# Patient Record
Sex: FEMALE | Race: WHITE | NOT HISPANIC OR LATINO | Employment: FULL TIME | URBAN - METROPOLITAN AREA
[De-identification: names, ages, dates, MRNs, and addresses within clinical notes are randomized per-mention and may not be internally consistent; named-entity substitution may affect disease eponyms.]

---
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
## 2019-06-21 ENCOUNTER — OFFICE VISIT (OUTPATIENT)
Dept: INTERNAL MEDICINE | Facility: CLINIC | Age: 19
End: 2019-06-21

## 2019-06-21 VITALS
RESPIRATION RATE: 14 BRPM | BODY MASS INDEX: 24.49 KG/M2 | HEIGHT: 65 IN | WEIGHT: 147 LBS | SYSTOLIC BLOOD PRESSURE: 118 MMHG | DIASTOLIC BLOOD PRESSURE: 72 MMHG

## 2019-06-21 DIAGNOSIS — N91.2 AMENORRHEA: ICD-10-CM

## 2019-06-21 DIAGNOSIS — Z00.00 HEALTH CARE MAINTENANCE: Primary | ICD-10-CM

## 2019-06-21 PROBLEM — G43.009 MIGRAINE WITHOUT AURA AND WITHOUT STATUS MIGRAINOSUS, NOT INTRACTABLE: Status: RESOLVED | Noted: 2018-02-07 | Resolved: 2019-06-21

## 2019-06-21 PROBLEM — IMO0002 CHRONIC MIGRAINE: Status: ACTIVE | Noted: 2018-02-07

## 2019-06-21 PROBLEM — F33.42 RECURRENT MAJOR DEPRESSIVE DISORDER, IN FULL REMISSION: Status: RESOLVED | Noted: 2018-02-07 | Resolved: 2019-06-21

## 2019-06-21 PROBLEM — K29.00 ACUTE SUPERFICIAL GASTRITIS WITHOUT HEMORRHAGE: Status: ACTIVE | Noted: 2018-02-07

## 2019-06-21 PROBLEM — F33.42 RECURRENT MAJOR DEPRESSIVE DISORDER, IN FULL REMISSION (HCC): Status: ACTIVE | Noted: 2018-02-07

## 2019-06-21 PROBLEM — K29.00 ACUTE SUPERFICIAL GASTRITIS WITHOUT HEMORRHAGE: Status: RESOLVED | Noted: 2018-02-07 | Resolved: 2019-06-21

## 2019-06-21 PROBLEM — G43.009 MIGRAINE WITHOUT AURA AND WITHOUT STATUS MIGRAINOSUS, NOT INTRACTABLE: Status: ACTIVE | Noted: 2018-02-07

## 2019-06-21 PROCEDURE — 81025 URINE PREGNANCY TEST: CPT | Performed by: INTERNAL MEDICINE

## 2019-06-21 PROCEDURE — 93000 ELECTROCARDIOGRAM COMPLETE: CPT | Performed by: INTERNAL MEDICINE

## 2019-06-21 PROCEDURE — 99385 PREV VISIT NEW AGE 18-39: CPT | Performed by: INTERNAL MEDICINE

## 2019-06-21 RX ORDER — ALBUTEROL SULFATE 90 UG/1
2 AEROSOL, METERED RESPIRATORY (INHALATION) EVERY 4 HOURS PRN
COMMUNITY
End: 2020-04-15 | Stop reason: SDUPTHER

## 2019-06-21 RX ORDER — ALBUTEROL SULFATE 1.25 MG/3ML
1 SOLUTION RESPIRATORY (INHALATION) EVERY 6 HOURS PRN
COMMUNITY
End: 2020-09-23

## 2019-06-21 RX ORDER — BUDESONIDE AND FORMOTEROL FUMARATE DIHYDRATE 160; 4.5 UG/1; UG/1
2 AEROSOL RESPIRATORY (INHALATION) 2 TIMES DAILY
COMMUNITY
End: 2019-07-09 | Stop reason: ALTCHOICE

## 2019-06-21 RX ORDER — CETIRIZINE HYDROCHLORIDE 10 MG/1
10 TABLET ORAL DAILY
COMMUNITY
End: 2020-09-23

## 2019-06-21 NOTE — PATIENT INSTRUCTIONS
Risk evaluation:  1. Cardiovascular risk factors: family history of CAD   2. Diabetes risk factors: FH of diabetes.  3. Cancer risk factors: FH of  colon cancer, FH of breast cancer, FH of ovarian cancer, h/o tocacco smoking and skin type associated with high risk of skin cancers.  4. Risky behavior: none. Use of seat belts: regular. Use of sunscreens: sporadic. SPF 15.   Tattoos: one.  H/o blood transfusions/organ transplants before 1992 or clotting factor transfusion before 1987: none.     Prevention:  Cholesterol test  recommended. Cholesterol is will be checked..  Blood sugar test up to date. Fasting blood sugar will be checked..  Hep C testing (for patients born [birth year]): patient states that she had been tested and is negative..  Mammogram not recommended yet.. Breast self exams recommended once a month.  Self breast examination technique explained in the model.  Colonoscopy: not recommended till the age of 50..  PAP smear : up to date. Recommendations per GYN..  DEXA : not indicated yet..

## 2019-06-21 NOTE — PROGRESS NOTES
"Subjective   Desirae Pascual is a 18 y.o. female.     History of Present Illness   /72 (BP Location: Left arm, Patient Position: Sitting, Cuff Size: Adult)   Resp 14   Ht 165.1 cm (65\")   Wt 66.7 kg (147 lb)   BMI 24.46 kg/m²   Desirae Pascual 18 y.o. female who is here to establish as a new patient. In a past had been to see  at [facility].  Past medical and surgical history, family and social history was obtained by me in the interview and recorded in the EHR. [facility] records available in [facility] had been reviewed and discussed with patient.    Patient also is concerned that she did not have a menstrual period for the last 6 weeks.  She used to have IUD, that was removed 6 months ago.  Patient is sexually active without protection.  Guardicel vaccination is up-to-date.  Has a gynecologist.    Current Outpatient Medications:   •  albuterol (ACCUNEB) 1.25 MG/3ML nebulizer solution, Take 1 ampule by nebulization Every 6 (Six) Hours As Needed., Disp: , Rfl:   •  albuterol sulfate  (90 Base) MCG/ACT inhaler, Inhale 2 puffs Every 4 (Four) Hours As Needed., Disp: , Rfl:   •  budesonide-formoterol (SYMBICORT) 160-4.5 MCG/ACT inhaler, Inhale 2 puffs 2 (Two) Times a Day., Disp: , Rfl:   •  cetirizine (zyrTEC) 10 MG tablet, Take 10 mg by mouth Daily., Disp: , Rfl:   •  Prenatal Vit-Fe Fumarate-FA (PRENATAL COMPLETE PO), Take  by mouth., Disp: , Rfl:   •  ranitidine (ZANTAC) 75 MG tablet, Take 75 mg by mouth 2 (two) times a day., Disp: , Rfl:   •  tiotropium bromide monohydrate (SPIRIVA RESPIMAT) 2.5 MCG/ACT aerosol solution inhaler, Inhale 2 puffs Daily., Disp: , Rfl:   No current facility-administered medications for this visit.   .  Patient rates her own health as: good.  Tobacco use: in remote past, as per .  Alcohol use:occasionally.  Recreational drugs use: none  Medication list rewieved.  Diet: regular.  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  Marital status: lives with " mother.   Employment: full time.  Patient rates her stress level as: high.  Dental health: good. Brushes teeth 2 times a day, does not floss. Dental visits: 2 times a year .  Vision correction: not needed  Hearing: normal.    Recent vaccinations:   Flu  is up to date and recommended yearly  Tdap will leave this to her GYN, as patient is pregnant.  Shingles prevention not recommended at this age  Cervical cancer prevention: completed Guardicel vaccine.    Patient is premenopausal. Past pregnancies: none. Currently patient is on no contraception.      Current Outpatient Medications:   •  cetirizine (zyrTEC) 10 MG tablet, Take 10 mg by mouth Daily., Disp: , Rfl:   •  Prenatal Vit-Fe Fumarate-FA (PRENATAL COMPLETE PO), Take  by mouth., Disp: , Rfl:   •  ranitidine (ZANTAC) 75 MG tablet, Take 75 mg by mouth 2 (two) times a day., Disp: , Rfl:     Current Facility-Administered Medications:   •  Levonorgestrel (LILETTA) 18.6 MCG/DAY IUD, , Intrauterine, Continuous, Robbie Ortiz MD          The following portions of the patient's history were reviewed and updated as appropriate: allergies, current medications, past family history, past medical history, past social history, past surgical history and problem list.    Review of Systems   Constitutional: Negative for chills and fever.   HENT: Negative for postnasal drip, sinus pressure and sore throat.    Eyes: Negative for pain and itching.   Respiratory: Negative for cough and chest tightness.    Cardiovascular: Negative for chest pain and leg swelling.   Gastrointestinal: Negative for abdominal pain and blood in stool.   Endocrine: Negative for cold intolerance and heat intolerance.   Genitourinary: Negative for difficulty urinating and flank pain.   Musculoskeletal: Positive for back pain. Negative for neck pain.   Skin: Negative for color change and rash.   Neurological: Positive for headaches. Negative for dizziness and weakness.   Hematological: Negative for  adenopathy. Does not bruise/bleed easily.   Psychiatric/Behavioral: Positive for sleep disturbance. The patient is nervous/anxious.        Objective   Physical Exam   Constitutional: She is oriented to person, place, and time. She appears well-developed and well-nourished. No distress.   HENT:   Head: Normocephalic and atraumatic.   Right Ear: External ear normal.   Left Ear: External ear normal.   Nose: Nose normal.   Mouth/Throat: Oropharynx is clear and moist. No oropharyngeal exudate.   Eyes: Conjunctivae and EOM are normal. Pupils are equal, round, and reactive to light. Right eye exhibits no discharge. Left eye exhibits no discharge. No scleral icterus.   Neck: Normal range of motion. Neck supple. No JVD present. No thyromegaly present.   Cardiovascular: Normal rate, regular rhythm, S1 normal, S2 normal, normal heart sounds and intact distal pulses. Exam reveals no gallop and no friction rub.   No murmur heard.  Pulmonary/Chest: Effort normal and breath sounds normal. No respiratory distress. She has no decreased breath sounds. She has no wheezes. She has no rhonchi. She has no rales. Right breast exhibits no inverted nipple, no mass, no nipple discharge, no skin change and no tenderness. Left breast exhibits no inverted nipple, no mass, no nipple discharge, no skin change and no tenderness. Breasts are symmetrical.   Abdominal: Soft. Bowel sounds are normal. She exhibits no distension and no mass. There is no tenderness. There is no rebound and no guarding.   Musculoskeletal: She exhibits no edema.   Lymphadenopathy:        Head (right side): No submental, no submandibular, no preauricular, no posterior auricular and no occipital adenopathy present.        Head (left side): No submental, no submandibular, no preauricular, no posterior auricular and no occipital adenopathy present.     She has no cervical adenopathy.        Right cervical: No superficial cervical, no deep cervical and no posterior cervical  adenopathy present.       Left cervical: No superficial cervical, no deep cervical and no posterior cervical adenopathy present.     She has no axillary adenopathy.        Right: No supraclavicular adenopathy present.        Left: No supraclavicular adenopathy present.   Neurological: She is alert and oriented to person, place, and time. She has normal reflexes. She displays normal reflexes. No cranial nerve deficit. She exhibits normal muscle tone. Coordination normal.   Reflex Scores:       Bicep reflexes are 2+ on the right side and 2+ on the left side.       Patellar reflexes are 2+ on the right side and 2+ on the left side.  Skin: Skin is warm. No rash noted. She is not diaphoretic. No erythema.   Small tattoo left wrist, umbilical piercing   Psychiatric: She has a normal mood and affect. Her behavior is normal. Thought content normal.   Vitals reviewed.    Reason for ECG:  screening  Rhythm: sinus  Arterial rate:90  NY interval: nl  P waves:nl  QRS:nl  ST: nl  QTc:448   Comparison: unavailable   Impression: normal ECG    Assessment/Plan   Desirae was seen today for establish care.    Diagnoses and all orders for this visit:    Health care maintenance         Risk evaluation:  1. Cardiovascular risk factors: family history of CAD   2. Diabetes risk factors: FH of diabetes.  3. Cancer risk factors: FH of  colon cancer, FH of breast cancer, FH of ovarian cancer, h/o tocacco smoking and skin type associated with high risk of skin cancers.  4. Risky behavior: none. Use of seat belts: regular. Use of sunscreens: sporadic. SPF 15.   Tattoos: one.  H/o blood transfusions/organ transplants before 1992 or clotting factor transfusion before 1987: none.     Prevention:  Cholesterol test  recommended. Cholesterol is will be checked..  Blood sugar test up to date. Fasting blood sugar will be checked..  Hep C testing (for patients born [birth year]72): patient states that she had been tested and is negative..  Mammogram not  recommended yet.. Breast self exams recommended once a month.  Self breast examination technique explained in the model.  Colonoscopy: not recommended till the age of 50..  PAP smear : up to date. Recommendations per GYN.  She states that she had a recent HIV test, that was negative.  DEXA : not indicated yet..       I had asked patient to have your vaccination records from pediatrician forwarded to our office.                   Amenorrhea -urine pregnancy test is positive.  I had advised patient on regular exercise.  Her current medication seems to be quite safe for the fetus.

## 2019-06-24 LAB — B-HCG UR QL: POSITIVE

## 2019-07-01 ENCOUNTER — TELEPHONE (OUTPATIENT)
Dept: OBSTETRICS AND GYNECOLOGY | Age: 19
End: 2019-07-01

## 2019-07-01 NOTE — TELEPHONE ENCOUNTER
New pt, former pt of Dr Ortiz, 8 weeks preg LMP 05/04/2019, 1st preg wants see Dr Yap. Pt is super sick and nauseous. Pt wants to Pt has no gyn issues. Pt INS: [employer]. Please Advise

## 2019-07-09 ENCOUNTER — INITIAL PRENATAL (OUTPATIENT)
Dept: OBSTETRICS AND GYNECOLOGY | Age: 19
End: 2019-07-09

## 2019-07-09 ENCOUNTER — PROCEDURE VISIT (OUTPATIENT)
Dept: OBSTETRICS AND GYNECOLOGY | Age: 19
End: 2019-07-09

## 2019-07-09 VITALS — WEIGHT: 145 LBS | DIASTOLIC BLOOD PRESSURE: 80 MMHG | SYSTOLIC BLOOD PRESSURE: 122 MMHG | BODY MASS INDEX: 24.13 KG/M2

## 2019-07-09 DIAGNOSIS — J45.40 MODERATE PERSISTENT ASTHMA WITHOUT COMPLICATION: ICD-10-CM

## 2019-07-09 DIAGNOSIS — O36.80X0 ENCOUNTER TO DETERMINE FETAL VIABILITY OF PREGNANCY, SINGLE OR UNSPECIFIED FETUS: Primary | ICD-10-CM

## 2019-07-09 DIAGNOSIS — Z11.3 SCREEN FOR SEXUALLY TRANSMITTED DISEASES: ICD-10-CM

## 2019-07-09 DIAGNOSIS — Z34.90 PREGNANCY, UNSPECIFIED GESTATIONAL AGE: Primary | ICD-10-CM

## 2019-07-09 DIAGNOSIS — Z13.89 SCREENING FOR BLOOD OR PROTEIN IN URINE: ICD-10-CM

## 2019-07-09 DIAGNOSIS — Z12.4 ROUTINE CERVICAL SMEAR: ICD-10-CM

## 2019-07-09 PROBLEM — Z00.00 HEALTH CARE MAINTENANCE: Status: RESOLVED | Noted: 2019-06-21 | Resolved: 2019-07-09

## 2019-07-09 LAB — VZV IGG SER QL: NORMAL

## 2019-07-09 PROCEDURE — 0501F PRENATAL FLOW SHEET: CPT | Performed by: OBSTETRICS & GYNECOLOGY

## 2019-07-09 PROCEDURE — 76817 TRANSVAGINAL US OBSTETRIC: CPT | Performed by: OBSTETRICS & GYNECOLOGY

## 2019-07-09 RX ORDER — PRENATAL WITH FERROUS FUM AND FOLIC ACID 3080; 920; 120; 400; 22; 1.84; 3; 20; 10; 1; 12; 200; 27; 25; 2 [IU]/1; [IU]/1; MG/1; [IU]/1; MG/1; MG/1; MG/1; MG/1; MG/1; MG/1; UG/1; MG/1; MG/1; MG/1; MG/1
1 TABLET ORAL DAILY
Qty: 30 TABLET | Refills: 11 | Status: SHIPPED | OUTPATIENT
Start: 2019-07-09 | End: 2019-07-29 | Stop reason: SDUPTHER

## 2019-07-09 RX ORDER — DOXYLAMINE SUCCINATE AND PYRIDOXINE HYDROCHLORIDE, DELAYED RELEASE TABLETS 10 MG/10 MG 10; 10 MG/1; MG/1
TABLET, DELAYED RELEASE ORAL
Qty: 100 TABLET | Refills: 3 | Status: SHIPPED | OUTPATIENT
Start: 2019-07-09 | End: 2019-07-29

## 2019-07-09 NOTE — PROGRESS NOTES
The patient is a 19-year-old  1 para 0 at 7 weeks 5 days by ultrasound.  Patient is a sure LMP but ultrasound does not agree.  Patient has had no vaginal bleeding.  She does have some mild nausea.  She request medication for anxiety.  Patient has a history of cutting and drug abuse.  Patient has seen psychiatry in the past.  She states she quit using hydrocodone and Adderall in 2017.  She quit marijuana just 3 weeks ago and stopped vaping just a few weeks ago also.  She is here today with her mother and boyfriend.  She works as a .    Patient does have significant asthma.  She is on a steroid inhaler and a rescue inhaler.  She also uses a category C inhaler intermittently.  She has been on oral steroids in the past.     Genetic history was reviewed and is negative    Family history is significant for breast cancer ovarian cancer melanoma and colon cancer.  She has not had genetic testing.    Exam-see exam tab  Ultrasound shows crown-rump length of 7 weeks 5 days with fetal heart rate of 164.  Corpus luteum on the left with no adnexal masses.    Assessment-7 weeks  Full new OB information and folder was reviewed with the patient prenatal labs will be sent off today.  EDC was adjusted so we will bring the patient back in 3 weeks for viability ultrasound.  Asthma-patient has significant asthma.  She does see a family and allergy doctor.  I did change her steroid inhaler to a category B inhaler.  Her other inhaler is category C but she uses it only intermittently.  Anxiety-patient will try her lavender oil and seeing her psychiatrist.  We will hold off on using medications right now.  Patient desires first trimester testing.  She declines amniocentesis.  Genetic history of cancers in the family.  Patient will have the full my risk test done today.

## 2019-07-10 LAB
ABO GROUP BLD: (no result)
BASOPHILS # BLD AUTO: 0 X10E3/UL (ref 0–0.2)
BASOPHILS NFR BLD AUTO: 0 %
BLD GP AB SCN SERPL QL: NEGATIVE
EOSINOPHIL # BLD AUTO: 0.2 X10E3/UL (ref 0–0.4)
EOSINOPHIL NFR BLD AUTO: 2 %
ERYTHROCYTE [DISTWIDTH] IN BLOOD BY AUTOMATED COUNT: 12.2 % (ref 12.3–15.4)
HBV SURFACE AG SERPL QL IA: NEGATIVE
HCT VFR BLD AUTO: 36.4 % (ref 34–46.6)
HCV AB S/CO SERPL IA: 0.1 S/CO RATIO (ref 0–0.9)
HGB BLD-MCNC: 12.6 G/DL (ref 11.1–15.9)
HIV 1+2 AB+HIV1 P24 AG SERPL QL IA: NON REACTIVE
IMM GRANULOCYTES # BLD AUTO: 0 X10E3/UL (ref 0–0.1)
IMM GRANULOCYTES NFR BLD AUTO: 0 %
LYMPHOCYTES # BLD AUTO: 1.3 X10E3/UL (ref 0.7–3.1)
LYMPHOCYTES NFR BLD AUTO: 22 %
MCH RBC QN AUTO: 30.3 PG (ref 26.6–33)
MCHC RBC AUTO-ENTMCNC: 34.6 G/DL (ref 31.5–35.7)
MCV RBC AUTO: 88 FL (ref 79–97)
MONOCYTES # BLD AUTO: 0.5 X10E3/UL (ref 0.1–0.9)
MONOCYTES NFR BLD AUTO: 7 %
NEUTROPHILS # BLD AUTO: 4.2 X10E3/UL (ref 1.4–7)
NEUTROPHILS NFR BLD AUTO: 69 %
PLATELET # BLD AUTO: 179 X10E3/UL (ref 150–450)
RBC # BLD AUTO: 4.16 X10E6/UL (ref 3.77–5.28)
RH BLD: POSITIVE
RPR SER QL: NON REACTIVE
RUBV IGG SERPL IA-ACNC: 3.33 INDEX
WBC # BLD AUTO: 6.1 X10E3/UL (ref 3.4–10.8)

## 2019-07-11 ENCOUNTER — TELEPHONE (OUTPATIENT)
Dept: OBSTETRICS AND GYNECOLOGY | Age: 19
End: 2019-07-11

## 2019-07-11 LAB
BACTERIA UR CULT: NO GROWTH
BACTERIA UR CULT: NORMAL
C TRACH RRNA CVX QL NAA+PROBE: NEGATIVE
CONV .: NORMAL
CYTOLOGIST CVX/VAG CYTO: NORMAL
CYTOLOGY CVX/VAG DOC CYTO: NORMAL
CYTOLOGY CVX/VAG DOC THIN PREP: NORMAL
DX ICD CODE: NORMAL
HIV 1 & 2 AB SER-IMP: NORMAL
N GONORRHOEA RRNA CVX QL NAA+PROBE: NEGATIVE
OTHER STN SPEC: NORMAL
PATHOLOGIST CVX/VAG CYTO: NORMAL
STAT OF ADQ CVX/VAG CYTO-IMP: NORMAL

## 2019-07-11 NOTE — TELEPHONE ENCOUNTER
----- Message from Nguyễn Yap MD sent at 7/10/2019  4:31 PM EDT -----  Please notify blood work is normal.

## 2019-07-12 ENCOUNTER — TELEPHONE (OUTPATIENT)
Dept: OBSTETRICS AND GYNECOLOGY | Age: 19
End: 2019-07-12

## 2019-07-12 DIAGNOSIS — Z80.3 FAMILY HISTORY OF BREAST CANCER: Primary | ICD-10-CM

## 2019-07-12 DIAGNOSIS — Z80.0 FAMILY HISTORY OF COLON CANCER: ICD-10-CM

## 2019-07-12 DIAGNOSIS — Z80.41 FAMILY HISTORY OF OVARIAN CANCER: ICD-10-CM

## 2019-07-16 ENCOUNTER — TELEPHONE (OUTPATIENT)
Dept: OBSTETRICS AND GYNECOLOGY | Age: 19
End: 2019-07-16

## 2019-07-23 PROBLEM — Z80.3 FAMILY HISTORY OF BREAST CANCER: Status: ACTIVE | Noted: 2019-07-23

## 2019-07-29 ENCOUNTER — ROUTINE PRENATAL (OUTPATIENT)
Dept: OBSTETRICS AND GYNECOLOGY | Age: 19
End: 2019-07-29

## 2019-07-29 ENCOUNTER — PROCEDURE VISIT (OUTPATIENT)
Dept: OBSTETRICS AND GYNECOLOGY | Age: 19
End: 2019-07-29

## 2019-07-29 VITALS — WEIGHT: 147 LBS | BODY MASS INDEX: 24.46 KG/M2 | SYSTOLIC BLOOD PRESSURE: 108 MMHG | DIASTOLIC BLOOD PRESSURE: 74 MMHG

## 2019-07-29 DIAGNOSIS — F19.11 HISTORY OF DRUG ABUSE (HCC): Primary | ICD-10-CM

## 2019-07-29 DIAGNOSIS — Z34.01 ENCOUNTER FOR SUPERVISION OF NORMAL FIRST PREGNANCY IN FIRST TRIMESTER: ICD-10-CM

## 2019-07-29 DIAGNOSIS — O36.80X0 ENCOUNTER TO DETERMINE FETAL VIABILITY OF PREGNANCY, SINGLE OR UNSPECIFIED FETUS: Primary | ICD-10-CM

## 2019-07-29 PROCEDURE — 76817 TRANSVAGINAL US OBSTETRIC: CPT | Performed by: OBSTETRICS & GYNECOLOGY

## 2019-07-29 PROCEDURE — 0502F SUBSEQUENT PRENATAL CARE: CPT | Performed by: OBSTETRICS & GYNECOLOGY

## 2019-07-29 NOTE — PROGRESS NOTES
Patient is having more anxiety off her Celexa.  She is also having some headaches.  She is trying Tylenol.  Her asthma has not been bad and she has not used any of her inhalers.  Her nausea is resolved.  She would like to do first trimester testing.    Prenatal labs are reviewed.  Drug screen was sent off today.  Viability ultrasound shows heart rate of 168.  2 pound weight gain since last visit.    Assessment-10 weeks  Anxiety-we discussed anxiety.  Patient will try to continue off Celexa.  If she is still having difficulty she may need to restart.  She will try Benadryl as needed to help with sleep and headaches.  Asthma-no current symptoms  First trimester testing was sent off today.

## 2019-07-30 LAB
AMPHETAMINES UR QL SCN: NEGATIVE NG/ML
BARBITURATES UR QL SCN: NEGATIVE NG/ML
BENZODIAZ UR QL: NEGATIVE NG/ML
BZE UR QL: NEGATIVE NG/ML
CANNABINOIDS UR QL SCN: NEGATIVE NG/ML
METHADONE UR QL SCN: NEGATIVE NG/ML
OPIATES UR QL: NEGATIVE NG/ML
PCP UR QL: NEGATIVE NG/ML
PROPOXYPH UR QL SCN: NEGATIVE NG/ML

## 2019-08-05 ENCOUNTER — TELEPHONE (OUTPATIENT)
Dept: OBSTETRICS AND GYNECOLOGY | Age: 19
End: 2019-08-05

## 2019-08-05 NOTE — TELEPHONE ENCOUNTER
----- Message from Nguyễn Yap MD sent at 8/5/2019  8:59 AM EDT -----  Notify first trimester testing is normal.  Notify of gender if the patient would like to know.

## 2019-08-19 ENCOUNTER — ROUTINE PRENATAL (OUTPATIENT)
Dept: OBSTETRICS AND GYNECOLOGY | Age: 19
End: 2019-08-19

## 2019-08-19 VITALS — DIASTOLIC BLOOD PRESSURE: 70 MMHG | SYSTOLIC BLOOD PRESSURE: 128 MMHG | WEIGHT: 151 LBS | BODY MASS INDEX: 25.13 KG/M2

## 2019-08-19 DIAGNOSIS — Z34.90 PREGNANCY, UNSPECIFIED GESTATIONAL AGE: ICD-10-CM

## 2019-08-19 DIAGNOSIS — J45.40 MODERATE PERSISTENT ASTHMA WITHOUT COMPLICATION: Primary | ICD-10-CM

## 2019-08-19 PROCEDURE — 0502F SUBSEQUENT PRENATAL CARE: CPT | Performed by: OBSTETRICS & GYNECOLOGY

## 2019-08-19 NOTE — PROGRESS NOTES
Complains of some tightness with her asthma.  She has done 2 albuterol treatments this week.  She never picked up the steroid inhaler that I prescribed.  She would like to stay off the Celexa.  She is having some insomnia and Benadryl does not help.  She did find out the gender of the baby.    First trimester testing is normal as is carrier testing.  Baby is a girl.  These results were reviewed with the patient  Drug screen was negative  Positive Doppler heart tones.  Weight gain for pregnancy is slightly high  Lungs- one expiratory wheeze heard but otherwise lungs are clear.    Assessment-13 weeks  Asthma-recommend the patient start Pulmicort steroid inhaler daily and continue albuterol as needed.  She will come to the hospital if symptoms are not resolved.  Anxiety  Follow-up in 4 weeks for AFP test

## 2019-09-09 ENCOUNTER — TELEPHONE (OUTPATIENT)
Dept: OBSTETRICS AND GYNECOLOGY | Age: 19
End: 2019-09-09

## 2019-09-09 NOTE — TELEPHONE ENCOUNTER
Dr Yap pt 16 weeks 4 days has been experiencing dizzy spells. Pt states it happened two times last week and one time today. Pt states she sats down, drinks water, and it goes away. Pt just wants to know if that is normal during pregnancy. Pt is scheduled 09/16/19 for OB f/u and Anatomy Scan. Please Advise

## 2019-09-09 NOTE — TELEPHONE ENCOUNTER
Please notify this is a very common symptom in pregnancy.  Please have patient eat frequent snacks about every 3-4 hours and drink enough water.  We could check a CBC at her next appointment.

## 2019-09-16 ENCOUNTER — ROUTINE PRENATAL (OUTPATIENT)
Dept: OBSTETRICS AND GYNECOLOGY | Age: 19
End: 2019-09-16

## 2019-09-16 VITALS — BODY MASS INDEX: 26.63 KG/M2 | WEIGHT: 160 LBS | SYSTOLIC BLOOD PRESSURE: 130 MMHG | DIASTOLIC BLOOD PRESSURE: 74 MMHG

## 2019-09-16 DIAGNOSIS — Z34.90 PREGNANCY, UNSPECIFIED GESTATIONAL AGE: ICD-10-CM

## 2019-09-16 DIAGNOSIS — F19.11 HISTORY OF DRUG ABUSE (HCC): Primary | ICD-10-CM

## 2019-09-16 DIAGNOSIS — J45.40 MODERATE PERSISTENT ASTHMA WITHOUT COMPLICATION: ICD-10-CM

## 2019-09-16 PROCEDURE — 0502F SUBSEQUENT PRENATAL CARE: CPT | Performed by: OBSTETRICS & GYNECOLOGY

## 2019-09-16 NOTE — PROGRESS NOTES
Patient reports that her asthma is much better.  She is using the Pulmicort twice a day.  She is not having more chest tightness.  She reports she can fall asleep but is waking up at night a lot and having some nightmares.  She has tried Benadryl already.  She just got back from a vacation and reports she was not eating very healthy.    Doppler heart tones are positive.  Blood pressure 130/74 with no protein.  Patient gained 9 pounds since her last visit.  Total weight gain for pregnancy is high at 20 pounds.    Assessment-17 weeks  Asthma-improved continue Pulmicort steroid inhaler.  Insomnia- we discussed trying a sleep noise machine and continuing antihistamines as needed.  Vagal episode at work-check CBC today.  Note for lifting restrictions and breaks at work also given  High weight gain for pregnancy.  We discussed diet and exercise.

## 2019-09-18 LAB
AFP ADJ MOM SERPL: 1.32
AFP INTERP SERPL-IMP: NORMAL
AFP INTERP SERPL-IMP: NORMAL
AFP SERPL-MCNC: 53.6 NG/ML
AGE AT DELIVERY: 21.6 YR
ERYTHROCYTE [DISTWIDTH] IN BLOOD BY AUTOMATED COUNT: 13 % (ref 12.3–15.4)
GA METHOD: NORMAL
GA: 18 WEEKS
HCT VFR BLD AUTO: 31.8 % (ref 34–46.6)
HGB BLD-MCNC: 10.5 G/DL (ref 12–15.9)
IDDM PATIENT QL: NO
LABORATORY COMMENT REPORT: NORMAL
MCH RBC QN AUTO: 30 PG (ref 26.6–33)
MCHC RBC AUTO-ENTMCNC: 33 G/DL (ref 31.5–35.7)
MCV RBC AUTO: 90.9 FL (ref 79–97)
MULTIPLE PREGNANCY: NO
NEURAL TUBE DEFECT RISK FETUS: 4529 %
PLATELET # BLD AUTO: 160 10*3/MM3 (ref 140–450)
RBC # BLD AUTO: 3.5 10*6/MM3 (ref 3.77–5.28)
RESULT: NORMAL
WBC # BLD AUTO: 7.7 10*3/MM3 (ref 3.4–10.8)

## 2019-09-20 ENCOUNTER — TELEPHONE (OUTPATIENT)
Dept: INTERNAL MEDICINE | Facility: CLINIC | Age: 19
End: 2019-09-20

## 2019-09-20 ENCOUNTER — TELEPHONE (OUTPATIENT)
Dept: OBSTETRICS AND GYNECOLOGY | Age: 19
End: 2019-09-20

## 2019-09-20 RX ORDER — AZITHROMYCIN 250 MG/1
TABLET, FILM COATED ORAL
Qty: 6 TABLET | Refills: 0 | Status: SHIPPED | OUTPATIENT
Start: 2019-09-20 | End: 2019-10-10

## 2019-09-20 NOTE — TELEPHONE ENCOUNTER
I would strongly discouraged your from taking any medication as she is 3 to 4 months pregnant.  Increase fluid intake, if needed may take some Tylenol over-the-counter in case if she is miserable.  If she feels like she really has to have antibiotic, please advise her to go to immediate care center after hours today or tomorrow.  I would strongly recommend to avoid use of antibiotics in your situation unless absolutely necessary-unless bacterial infection is confirmed.

## 2019-09-20 NOTE — TELEPHONE ENCOUNTER
----- Message from Shelly Rubin sent at 9/20/2019 10:06 AM EDT -----  Contact: pt  Pt is calling complaining of sinus and drainage, cough, pressure in eyes and green snot.  Is unable to come in due to work.  Would like something called in.    Rakesh- new one for [facility].    Pt#-[phone]

## 2019-09-20 NOTE — TELEPHONE ENCOUNTER
(Fracisco pt) Pt wanted to FYI only that she is 5 months pregnant and she was prescribed a z-pac for her sinus infection.

## 2019-09-20 NOTE — TELEPHONE ENCOUNTER
I have given patient your detailed message she states she has spoke to her gynecologist and it is ok for her to take a zpak, she states she is sick, she is at work she cannot come in and wants a zpak

## 2019-10-10 ENCOUNTER — PROCEDURE VISIT (OUTPATIENT)
Dept: OBSTETRICS AND GYNECOLOGY | Age: 19
End: 2019-10-10

## 2019-10-10 ENCOUNTER — ROUTINE PRENATAL (OUTPATIENT)
Dept: OBSTETRICS AND GYNECOLOGY | Age: 19
End: 2019-10-10

## 2019-10-10 VITALS — SYSTOLIC BLOOD PRESSURE: 124 MMHG | WEIGHT: 164 LBS | BODY MASS INDEX: 27.29 KG/M2 | DIASTOLIC BLOOD PRESSURE: 72 MMHG

## 2019-10-10 DIAGNOSIS — Z34.90 PREGNANCY, UNSPECIFIED GESTATIONAL AGE: ICD-10-CM

## 2019-10-10 DIAGNOSIS — Z23 NEEDS FLU SHOT: Primary | ICD-10-CM

## 2019-10-10 DIAGNOSIS — Z34.92 ENCOUNTER FOR SUPERVISION OF NORMAL PREGNANCY IN SECOND TRIMESTER, UNSPECIFIED GRAVIDITY: Primary | ICD-10-CM

## 2019-10-10 DIAGNOSIS — Z03.75 SUSPECTED CERVICAL SHORTENING NOT FOUND: ICD-10-CM

## 2019-10-10 DIAGNOSIS — J45.40 MODERATE PERSISTENT ASTHMA WITHOUT COMPLICATION: ICD-10-CM

## 2019-10-10 PROCEDURE — 90471 IMMUNIZATION ADMIN: CPT | Performed by: OBSTETRICS & GYNECOLOGY

## 2019-10-10 PROCEDURE — 76817 TRANSVAGINAL US OBSTETRIC: CPT | Performed by: OBSTETRICS & GYNECOLOGY

## 2019-10-10 PROCEDURE — 76805 OB US >/= 14 WKS SNGL FETUS: CPT | Performed by: OBSTETRICS & GYNECOLOGY

## 2019-10-10 PROCEDURE — 90674 CCIIV4 VAC NO PRSV 0.5 ML IM: CPT | Performed by: OBSTETRICS & GYNECOLOGY

## 2019-10-10 PROCEDURE — 0502F SUBSEQUENT PRENATAL CARE: CPT | Performed by: OBSTETRICS & GYNECOLOGY

## 2019-10-10 NOTE — PROGRESS NOTES
Patient reports she is sleeping much better.  She has noticed some changes with her asthma due to the weather change but she is going to see her allergist.  She is using Pulmicort.  Positive fetal movement.  Baby is a girl.    Anatomy ultrasound shows normal anatomy within the limits of ultrasound except for suboptimal left outflow track view.  Size equal to dates, placenta is posterior with no previa, baby is breech, cervical length is normal at 4 cm with no funneling.  AFP was normal    Assessment-21 weeks  Recheck ultrasound at next visit for better cardiac views  Asthma-see allergist and continue Pulmicort and albuterol.  High weight gain for pregnancy.  Fetal weight is normal.

## 2019-11-04 ENCOUNTER — PROCEDURE VISIT (OUTPATIENT)
Dept: OBSTETRICS AND GYNECOLOGY | Age: 19
End: 2019-11-04

## 2019-11-04 ENCOUNTER — ROUTINE PRENATAL (OUTPATIENT)
Dept: OBSTETRICS AND GYNECOLOGY | Age: 19
End: 2019-11-04

## 2019-11-04 VITALS — SYSTOLIC BLOOD PRESSURE: 118 MMHG | DIASTOLIC BLOOD PRESSURE: 66 MMHG | BODY MASS INDEX: 28.46 KG/M2 | WEIGHT: 171 LBS

## 2019-11-04 DIAGNOSIS — IMO0002 EVALUATE ANATOMY NOT SEEN ON PRIOR SONOGRAM: Primary | ICD-10-CM

## 2019-11-04 DIAGNOSIS — D50.8 OTHER IRON DEFICIENCY ANEMIA: Primary | ICD-10-CM

## 2019-11-04 DIAGNOSIS — Z34.90 PREGNANCY, UNSPECIFIED GESTATIONAL AGE: ICD-10-CM

## 2019-11-04 PROBLEM — D50.9 IRON (FE) DEFICIENCY ANEMIA: Status: ACTIVE | Noted: 2019-11-04

## 2019-11-04 PROCEDURE — 76816 OB US FOLLOW-UP PER FETUS: CPT | Performed by: OBSTETRICS & GYNECOLOGY

## 2019-11-04 PROCEDURE — 0502F SUBSEQUENT PRENATAL CARE: CPT | Performed by: OBSTETRICS & GYNECOLOGY

## 2019-11-04 RX ORDER — BUDESONIDE AND FORMOTEROL FUMARATE DIHYDRATE 160; 4.5 UG/1; UG/1
2 AEROSOL RESPIRATORY (INHALATION)
COMMUNITY
End: 2020-09-23

## 2019-11-04 RX ORDER — FERROUS SULFATE 325(65) MG
325 TABLET ORAL
Qty: 30 TABLET | Refills: 6 | Status: SHIPPED | OUTPATIENT
Start: 2019-11-04 | End: 2020-09-23

## 2019-11-04 NOTE — PROGRESS NOTES
The patient has noticed some worsening of her asthma.  She would like to go back on her previous inhalers Symbicort and Spiriva which are both category C.  The Pulmicort is not helping.  She did have to go to primary care and received a steroid injection.  She does follow with her asthma doctor.  Patient has not started iron yet.  Patient is here today for repeat look at the fetal heart due to inadequate views on previous ultrasound.    Ultrasound shows normal cardiac views and normal growth.  Hemoglobin was low at 10.5.    Assessment-24 weeks  Asthma- we discussed risk and benefits of different inhalers.  She will change to the 2 inhalers that have helped her in the past.  We will DC the Pulmicort.  Anemia-start iron  High weight gain was reviewed.  Anxiety-under good control.

## 2019-11-07 ENCOUNTER — PROCEDURE VISIT (OUTPATIENT)
Dept: OBSTETRICS AND GYNECOLOGY | Age: 19
End: 2019-11-07

## 2019-11-07 ENCOUNTER — TELEPHONE (OUTPATIENT)
Dept: OBSTETRICS AND GYNECOLOGY | Age: 19
End: 2019-11-07

## 2019-11-07 ENCOUNTER — ROUTINE PRENATAL (OUTPATIENT)
Dept: OBSTETRICS AND GYNECOLOGY | Age: 19
End: 2019-11-07

## 2019-11-07 VITALS — DIASTOLIC BLOOD PRESSURE: 82 MMHG | SYSTOLIC BLOOD PRESSURE: 140 MMHG | BODY MASS INDEX: 28.66 KG/M2 | WEIGHT: 172.2 LBS

## 2019-11-07 DIAGNOSIS — R10.2 FEELING PELVIC PRESSURE DURING PREGNANCY, ANTEPARTUM: Primary | ICD-10-CM

## 2019-11-07 DIAGNOSIS — O26.899 FEELING PELVIC PRESSURE DURING PREGNANCY, ANTEPARTUM: Primary | ICD-10-CM

## 2019-11-07 DIAGNOSIS — O47.02 THREATENED PREMATURE LABOR IN SECOND TRIMESTER: Primary | ICD-10-CM

## 2019-11-07 PROCEDURE — 76817 TRANSVAGINAL US OBSTETRIC: CPT | Performed by: OBSTETRICS & GYNECOLOGY

## 2019-11-07 PROCEDURE — 0502F SUBSEQUENT PRENATAL CARE: CPT | Performed by: OBSTETRICS & GYNECOLOGY

## 2019-11-07 NOTE — PROGRESS NOTES
Patient states that the baby was moving quite a bit and she felt some pelvic pressure.  No bleeding or regular contractions.  She has noticed some swelling in her ankles and is felt slightly nauseous today.     Cervical length is normal at 4.45 cm with no funneling.  Baby is breech.  Initial blood pressure was 140/82.  Repeat blood pressures 132/68.  Trace lower extremity pitting edema.  No proteinuria.    Assessment-25 weeks  Cervical length is reassuring.  Patient was counseled on signs and symptoms of  labor.  I will take her off work for the next 3 days and she will increase her rest.  Follow-up with me next week.  She was instructed to go to the hospital if any recurrence of symptoms.

## 2019-11-12 ENCOUNTER — OFFICE VISIT (OUTPATIENT)
Dept: INTERNAL MEDICINE | Facility: CLINIC | Age: 19
End: 2019-11-12

## 2019-11-12 VITALS
TEMPERATURE: 98.4 F | SYSTOLIC BLOOD PRESSURE: 100 MMHG | WEIGHT: 170 LBS | BODY MASS INDEX: 28.32 KG/M2 | HEART RATE: 91 BPM | HEIGHT: 65 IN | DIASTOLIC BLOOD PRESSURE: 60 MMHG | OXYGEN SATURATION: 99 %

## 2019-11-12 DIAGNOSIS — J02.9 ACUTE PHARYNGITIS, UNSPECIFIED ETIOLOGY: Primary | ICD-10-CM

## 2019-11-12 LAB
BASOPHILS # BLD AUTO: 0.01 10*3/MM3 (ref 0–0.2)
BASOPHILS NFR BLD AUTO: 0.1 % (ref 0–1.5)
DEPRECATED RDW RBC AUTO: 39.3 FL (ref 37–54)
EOSINOPHIL # BLD AUTO: 0.52 10*3/MM3 (ref 0–0.4)
EOSINOPHIL NFR BLD AUTO: 6.7 % (ref 0.3–6.2)
ERYTHROCYTE [DISTWIDTH] IN BLOOD BY AUTOMATED COUNT: 12.7 % (ref 12.3–15.4)
EXPIRATION DATE: NORMAL
HCT VFR BLD AUTO: 32.7 % (ref 34–46.6)
HGB BLD-MCNC: 10.9 G/DL (ref 12–15.9)
INTERNAL CONTROL: NORMAL
LYMPHOCYTES # BLD AUTO: 1.22 10*3/MM3 (ref 0.7–3.1)
LYMPHOCYTES NFR BLD AUTO: 15.6 % (ref 19.6–45.3)
Lab: NORMAL
MCH RBC QN AUTO: 29.6 PG (ref 26.6–33)
MCHC RBC AUTO-ENTMCNC: 33.3 G/DL (ref 31.5–35.7)
MCV RBC AUTO: 88.9 FL (ref 79–97)
MONOCYTES # BLD AUTO: 0.42 10*3/MM3 (ref 0.1–0.9)
MONOCYTES NFR BLD AUTO: 5.4 % (ref 5–12)
NEUTROPHILS # BLD AUTO: 5.64 10*3/MM3 (ref 1.7–7)
NEUTROPHILS NFR BLD AUTO: 72.2 % (ref 42.7–76)
PLATELET # BLD AUTO: 195 10*3/MM3 (ref 140–450)
PMV BLD AUTO: 10.9 FL (ref 6–12)
RBC # BLD AUTO: 3.68 10*6/MM3 (ref 3.77–5.28)
S PYO AG THROAT QL: NEGATIVE
WBC NRBC COR # BLD: 7.81 10*3/MM3 (ref 3.4–10.8)

## 2019-11-12 PROCEDURE — 99213 OFFICE O/P EST LOW 20 MIN: CPT | Performed by: INTERNAL MEDICINE

## 2019-11-12 PROCEDURE — 36415 COLL VENOUS BLD VENIPUNCTURE: CPT | Performed by: INTERNAL MEDICINE

## 2019-11-12 PROCEDURE — 87880 STREP A ASSAY W/OPTIC: CPT | Performed by: INTERNAL MEDICINE

## 2019-11-12 PROCEDURE — 85025 COMPLETE CBC W/AUTO DIFF WBC: CPT | Performed by: INTERNAL MEDICINE

## 2019-11-12 NOTE — PROGRESS NOTES
"Subjective   Desirae Pascual is a 19 y.o. female.     History of Present Illness   /60 (BP Location: Left arm, Patient Position: Sitting, Cuff Size: Adult)   Pulse 91   Temp 98.4 °F (36.9 °C)   Ht 165.1 cm (65\")   Wt 77.1 kg (170 lb)   LMP 2019 (Exact Date)   SpO2 99%   BMI 28.29 kg/m²   Patient complains of sore throat, dry cough, earache left and swollen glands. S-ms started 4 days ago ago.Patient describes cough as nonproductive. Associated symptoms include congestion, headache, sneezing and sore throat. Symptoms get worse  no apparent reason. Patient has fatigue. Patient has had no wheezing.   Patient has had ID contacts.   Overall s-ms had been stable. Patient had tried to use OTC Tylenol without improvement. Patient is 26 weeks .      Current Outpatient Medications:   •  albuterol (ACCUNEB) 1.25 MG/3ML nebulizer solution, Take 1 ampule by nebulization Every 6 (Six) Hours As Needed., Disp: , Rfl:   •  albuterol sulfate  (90 Base) MCG/ACT inhaler, Inhale 2 puffs Every 4 (Four) Hours As Needed., Disp: , Rfl:   •  budesonide-formoterol (SYMBICORT) 160-4.5 MCG/ACT inhaler, Inhale 2 puffs 2 (Two) Times a Day., Disp: , Rfl:   •  cetirizine (zyrTEC) 10 MG tablet, Take 10 mg by mouth Daily., Disp: , Rfl:   •  ferrous sulfate 325 (65 FE) MG tablet, Take 1 tablet by mouth Daily With Breakfast., Disp: 30 tablet, Rfl: 6  •  Prenatal Vit-Fe Fumarate-FA (PRENATAL COMPLETE PO), Take  by mouth., Disp: , Rfl:   •  Tiotropium Bromide Monohydrate (SPIRIVA RESPIMAT) 1.25 MCG/ACT aerosol solution inhaler, Inhale 2 puffs Daily., Disp: , Rfl:   The following portions of the patient's history were reviewed and updated as appropriate: allergies, current medications, past family history, past medical history, past social history, past surgical history and problem list.    Review of Systems   Constitutional: Negative for chills and fever.   HENT: Positive for ear pain and sore throat.    Eyes: Negative for " pain and redness.   Respiratory: Negative for cough and shortness of breath.    Cardiovascular: Negative for chest pain and leg swelling.   Neurological: Negative for dizziness and headaches.       Objective   Physical Exam   Constitutional: She is oriented to person, place, and time. She appears well-developed. No distress.      HENT:   Head: Normocephalic and atraumatic.   Right Ear: Tympanic membrane, external ear and ear canal normal. No tenderness. No mastoid tenderness. Tympanic membrane is not injected. No middle ear effusion.   Left Ear: Tympanic membrane, external ear and ear canal normal. There is tenderness. No mastoid tenderness. Tympanic membrane is not injected.  No middle ear effusion.   Nose: Mucosal edema and rhinorrhea present. No sinus tenderness. Right sinus exhibits maxillary sinus tenderness. Right sinus exhibits no frontal sinus tenderness. Left sinus exhibits maxillary sinus tenderness. Left sinus exhibits no frontal sinus tenderness.   Mouth/Throat: Uvula is midline and mucous membranes are normal. No oral lesions. Posterior oropharyngeal erythema present. No oropharyngeal exudate.   Eyes: Conjunctivae and lids are normal. Pupils are equal, round, and reactive to light. Right eye exhibits no discharge. Left eye exhibits no discharge. No scleral icterus.   Neck: Neck supple. No thyromegaly present.   Cardiovascular: Normal rate and regular rhythm.   Pulmonary/Chest: Effort normal. No accessory muscle usage. No tachypnea and no bradypnea. No respiratory distress. She has no decreased breath sounds. She has no wheezes. She has no rales. She exhibits no tenderness.   Lymphadenopathy:        Head (right side): No submental, no submandibular, no preauricular, no posterior auricular and no occipital adenopathy present.        Head (left side): No submental, no submandibular, no preauricular, no posterior auricular and no occipital adenopathy present.     She has no cervical adenopathy.         Right cervical: No superficial cervical, no deep cervical and no posterior cervical adenopathy present.       Left cervical: No superficial cervical, no deep cervical and no posterior cervical adenopathy present.        Right: No supraclavicular adenopathy present.        Left: No supraclavicular adenopathy present.   Neurological: She is alert and oriented to person, place, and time.   Skin: Skin is warm. She is not diaphoretic.   Psychiatric: She has a normal mood and affect. Her behavior is normal.   Vitals reviewed.      Assessment/Plan   Desirae was seen today for sore throat and earache.    Diagnoses and all orders for this visit:    Acute pharyngitis, unspecified etiology  -     POC Rapid Strep A  -     CBC & Differential      Upper respiratory infection-viral.  Normal blood cell counts and normal strep throat swab.  No evidence of a bacterial infection.  There is no place for use of antibiotics in this setting.  Patient was advised to hydrate well, take extra vitamin C, and use over-the-counter Tylenol as needed for discomfort.

## 2019-11-15 ENCOUNTER — ROUTINE PRENATAL (OUTPATIENT)
Dept: OBSTETRICS AND GYNECOLOGY | Age: 19
End: 2019-11-15

## 2019-11-15 VITALS — SYSTOLIC BLOOD PRESSURE: 128 MMHG | WEIGHT: 171 LBS | DIASTOLIC BLOOD PRESSURE: 70 MMHG | BODY MASS INDEX: 28.46 KG/M2

## 2019-11-15 DIAGNOSIS — Z34.90 PREGNANCY, UNSPECIFIED GESTATIONAL AGE: Primary | ICD-10-CM

## 2019-11-15 PROCEDURE — 0502F SUBSEQUENT PRENATAL CARE: CPT | Performed by: NURSE PRACTITIONER

## 2019-11-15 NOTE — PROGRESS NOTES
Here for follow up, 26w1d.  She is feeling well and went back to work.  She feels that her swelling was related to diet and has improved.  No contractions or bleeding.  Baby is very active.  No headaches or blurry vision  A/P normal assessment, initial BP slightly elevated but better with recheck  Was 128/70.  No edema.  PTL and PIH warnings reviewed.  Discussed [facility].  Plan follow up 2 weeks as scheduled.

## 2019-12-05 ENCOUNTER — ROUTINE PRENATAL (OUTPATIENT)
Dept: OBSTETRICS AND GYNECOLOGY | Age: 19
End: 2019-12-05

## 2019-12-05 VITALS — BODY MASS INDEX: 28.79 KG/M2 | WEIGHT: 173 LBS | SYSTOLIC BLOOD PRESSURE: 128 MMHG | DIASTOLIC BLOOD PRESSURE: 84 MMHG

## 2019-12-05 DIAGNOSIS — Z13.1 SCREENING FOR DIABETES MELLITUS: ICD-10-CM

## 2019-12-05 DIAGNOSIS — Z34.90 PREGNANCY, UNSPECIFIED GESTATIONAL AGE: ICD-10-CM

## 2019-12-05 DIAGNOSIS — D50.8 OTHER IRON DEFICIENCY ANEMIA: ICD-10-CM

## 2019-12-05 DIAGNOSIS — J45.40 MODERATE PERSISTENT ASTHMA WITHOUT COMPLICATION: ICD-10-CM

## 2019-12-05 DIAGNOSIS — Z13.0 SCREENING FOR IRON DEFICIENCY ANEMIA: Primary | ICD-10-CM

## 2019-12-05 PROBLEM — J02.9 ACUTE PHARYNGITIS: Status: RESOLVED | Noted: 2019-11-12 | Resolved: 2019-12-05

## 2019-12-05 PROCEDURE — 90715 TDAP VACCINE 7 YRS/> IM: CPT | Performed by: OBSTETRICS & GYNECOLOGY

## 2019-12-05 PROCEDURE — 0502F SUBSEQUENT PRENATAL CARE: CPT | Performed by: OBSTETRICS & GYNECOLOGY

## 2019-12-05 PROCEDURE — 90471 IMMUNIZATION ADMIN: CPT | Performed by: OBSTETRICS & GYNECOLOGY

## 2019-12-05 RX ORDER — ESOMEPRAZOLE MAGNESIUM 40 MG/1
40 FOR SUSPENSION ORAL
Qty: 30 EACH | Refills: 11 | Status: SHIPPED | OUTPATIENT
Start: 2019-12-05 | End: 2020-02-28

## 2019-12-05 NOTE — PROGRESS NOTES
The patient complains of some reflux.  She has tried Tums with no improvement.  Baby is moving well.  She is taking her iron and prenatal vitamins.  Her asthma is doing well.  No complaints of bleeding or contractions.    Fundal height is appropriate.  Doppler tones are positive.  Blood pressure is normal 128/84 with no protein.  Weight gain for pregnancy is high.    Assessment-29 weeks  Third trimester labs today.  Patient's blood type is B+.  Pertussis vaccination today.  Her  has had his vaccination.  Reflux-start Nexium  Check fetal weight due to high weight gain at next visit.  Anxiety- patient is doing well.  Recommend classes and picking a pediatrician.

## 2019-12-06 ENCOUNTER — TELEPHONE (OUTPATIENT)
Dept: OBSTETRICS AND GYNECOLOGY | Age: 19
End: 2019-12-06

## 2019-12-06 LAB
ERYTHROCYTE [DISTWIDTH] IN BLOOD BY AUTOMATED COUNT: 12.2 % (ref 12.3–15.4)
GLUCOSE 1H P 50 G GLC PO SERPL-MCNC: 135 MG/DL (ref 65–179)
HCT VFR BLD AUTO: 30.9 % (ref 34–46.6)
HGB BLD-MCNC: 10.3 G/DL (ref 12–15.9)
MCH RBC QN AUTO: 29.1 PG (ref 26.6–33)
MCHC RBC AUTO-ENTMCNC: 33.3 G/DL (ref 31.5–35.7)
MCV RBC AUTO: 87.3 FL (ref 79–97)
PLATELET # BLD AUTO: 177 10*3/MM3 (ref 140–450)
RBC # BLD AUTO: 3.54 10*6/MM3 (ref 3.77–5.28)
WBC # BLD AUTO: 8.27 10*3/MM3 (ref 3.4–10.8)

## 2019-12-06 NOTE — TELEPHONE ENCOUNTER
----- Message from Nguyễn Yap MD sent at 12/6/2019 10:33 AM EST -----  Please notify gestational diabetes screen is 135.  Recommend 3-hour GTT.  Patient is also anemic.  Please send in ferrous sulfate 325 mg 1 p.o. daily.

## 2019-12-09 ENCOUNTER — TELEPHONE (OUTPATIENT)
Dept: OBSTETRICS AND GYNECOLOGY | Age: 19
End: 2019-12-09

## 2019-12-09 NOTE — TELEPHONE ENCOUNTER
----- Message from Rosamaria Raphael MA sent at 12/9/2019  1:07 PM EST -----      ----- Message -----  From: Nguyễn Yap MD  Sent: 12/6/2019  10:33 AM EST  To: Rosamaria Raphael MA    Please notify gestational diabetes screen is 135.  Recommend 3-hour GTT.  Patient is also anemic.  Please send in ferrous sulfate 325 mg 1 p.o. daily.

## 2019-12-10 ENCOUNTER — TELEPHONE (OUTPATIENT)
Dept: OBSTETRICS AND GYNECOLOGY | Age: 19
End: 2019-12-10

## 2019-12-19 ENCOUNTER — ROUTINE PRENATAL (OUTPATIENT)
Dept: OBSTETRICS AND GYNECOLOGY | Age: 19
End: 2019-12-19

## 2019-12-19 ENCOUNTER — PROCEDURE VISIT (OUTPATIENT)
Dept: OBSTETRICS AND GYNECOLOGY | Age: 19
End: 2019-12-19

## 2019-12-19 VITALS — WEIGHT: 175 LBS | SYSTOLIC BLOOD PRESSURE: 118 MMHG | DIASTOLIC BLOOD PRESSURE: 70 MMHG | BODY MASS INDEX: 29.12 KG/M2

## 2019-12-19 DIAGNOSIS — O26.03 EXCESSIVE WEIGHT GAIN DURING PREGNANCY IN THIRD TRIMESTER: Primary | ICD-10-CM

## 2019-12-19 DIAGNOSIS — Z34.90 PREGNANCY, UNSPECIFIED GESTATIONAL AGE: ICD-10-CM

## 2019-12-19 DIAGNOSIS — Z13.89 SCREENING FOR BLOOD OR PROTEIN IN URINE: Primary | ICD-10-CM

## 2019-12-19 DIAGNOSIS — D50.0 IRON DEFICIENCY ANEMIA DUE TO CHRONIC BLOOD LOSS: ICD-10-CM

## 2019-12-19 LAB
GLUCOSE UR STRIP-MCNC: NEGATIVE MG/DL
PROT UR STRIP-MCNC: NEGATIVE MG/DL

## 2019-12-19 PROCEDURE — 76816 OB US FOLLOW-UP PER FETUS: CPT | Performed by: OBSTETRICS & GYNECOLOGY

## 2019-12-19 PROCEDURE — 0502F SUBSEQUENT PRENATAL CARE: CPT | Performed by: OBSTETRICS & GYNECOLOGY

## 2019-12-19 PROCEDURE — 81002 URINALYSIS NONAUTO W/O SCOPE: CPT | Performed by: OBSTETRICS & GYNECOLOGY

## 2019-12-19 NOTE — PROGRESS NOTES
The patient is feeling well.  The Nexium helped with her reflux.  She notes good fetal movements.  No contractions or vaginal bleeding.    Ultrasound for fetal weight shows an estimated fetal weight of 3 pounds 12 ounces at the 50th percentile.  KHUSHBU is 19.  Baby is vertex.  Blood pressure 118/70 with no protein.  Fundal height is appropriate.  Doppler heart tones are positive.  Weight gain for pregnancy is high.  1 hour glucose tolerance test is borderline at 135.  Patient did not get the message to come in likely due to her work schedule.    Assessment-31 weeks  Normal weight on ultrasound today.  Recommend kick counts.  Reflux-continue Nexium  Anxiety-patient is doing well.  Patient will bring her pediatrician next visit.  Glucose tolerance test tomorrow.  Increase iron to twice daily as tolerated.

## 2019-12-23 ENCOUNTER — TELEPHONE (OUTPATIENT)
Dept: OBSTETRICS AND GYNECOLOGY | Age: 19
End: 2019-12-23

## 2019-12-23 NOTE — TELEPHONE ENCOUNTER
----- Message from Nguyễn Yap MD sent at 12/21/2019 10:44 AM EST -----  Please notify 3-hour glucose tolerance test is normal.

## 2020-01-02 ENCOUNTER — OFFICE VISIT (OUTPATIENT)
Dept: INTERNAL MEDICINE | Facility: CLINIC | Age: 20
End: 2020-01-02

## 2020-01-02 VITALS
HEIGHT: 65 IN | WEIGHT: 178.8 LBS | SYSTOLIC BLOOD PRESSURE: 118 MMHG | DIASTOLIC BLOOD PRESSURE: 70 MMHG | OXYGEN SATURATION: 99 % | TEMPERATURE: 98.3 F | BODY MASS INDEX: 29.79 KG/M2 | HEART RATE: 116 BPM

## 2020-01-02 DIAGNOSIS — R19.7 DIARRHEA OF PRESUMED INFECTIOUS ORIGIN: Primary | ICD-10-CM

## 2020-01-02 DIAGNOSIS — R11.2 NON-INTRACTABLE VOMITING WITH NAUSEA, UNSPECIFIED VOMITING TYPE: ICD-10-CM

## 2020-01-02 LAB
EXPIRATION DATE: NORMAL
FLUAV AG NPH QL: NEGATIVE
FLUBV AG NPH QL: NEGATIVE
INTERNAL CONTROL: NORMAL
Lab: NORMAL

## 2020-01-02 PROCEDURE — 99213 OFFICE O/P EST LOW 20 MIN: CPT | Performed by: INTERNAL MEDICINE

## 2020-01-02 PROCEDURE — 87804 INFLUENZA ASSAY W/OPTIC: CPT | Performed by: INTERNAL MEDICINE

## 2020-01-02 RX ORDER — PROMETHAZINE HYDROCHLORIDE 12.5 MG/1
12.5 TABLET ORAL EVERY 6 HOURS PRN
Qty: 40 TABLET | Refills: 0 | Status: SHIPPED | OUTPATIENT
Start: 2020-01-02 | End: 2020-01-02 | Stop reason: SDUPTHER

## 2020-01-02 RX ORDER — PROMETHAZINE HYDROCHLORIDE 12.5 MG/1
12.5 TABLET ORAL EVERY 6 HOURS PRN
Qty: 40 TABLET | Refills: 0 | Status: SHIPPED | OUTPATIENT
Start: 2020-01-02 | End: 2020-02-28

## 2020-01-02 NOTE — PROGRESS NOTES
"Marbin Pascual is a 19 y.o. female.     History of Present Illness   /70   Pulse 116   Temp 98.3 °F (36.8 °C)   Ht 165.1 cm (65\")   Wt 81.1 kg (178 lb 12.8 oz)   LMP 05/02/2019 (Exact Date)   SpO2 99%   BMI 29.75 kg/m²   Patient complains of sore throat and headache. S-ms started 2 days ago ago.Patient describes diarrhea and nausea with vomiting since last night. Had 5-6 episodes of vomiting, able to keep liquids down,but unable to eat any solid food. No blood in stool, no melena.. Associated symptoms include achiness, chills and sore throat. Symptoms get worse  no apparent reason. Patient has fatigue. Patient reports her T between 100 and 101. She took some Tylenol today. Patient is 33 weeks pregnant.  Patient has had ID contacts: \"everyone at work is sick\". FLU VACCINE IS UP TO DATE.  Patient had tried to use OTC Tylenol.       Current Outpatient Medications:   •  albuterol (ACCUNEB) 1.25 MG/3ML nebulizer solution, Take 1 ampule by nebulization Every 6 (Six) Hours As Needed., Disp: , Rfl:   •  albuterol sulfate  (90 Base) MCG/ACT inhaler, Inhale 2 puffs Every 4 (Four) Hours As Needed., Disp: , Rfl:   •  budesonide-formoterol (SYMBICORT) 160-4.5 MCG/ACT inhaler, Inhale 2 puffs 2 (Two) Times a Day., Disp: , Rfl:   •  cetirizine (zyrTEC) 10 MG tablet, Take 10 mg by mouth Daily., Disp: , Rfl:   •  esomeprazole (nexIUM) 40 MG packet, Take 40 mg by mouth Every Morning Before Breakfast., Disp: 30 each, Rfl: 11  •  ferrous sulfate 325 (65 FE) MG tablet, Take 1 tablet by mouth Daily With Breakfast., Disp: 30 tablet, Rfl: 6  •  Prenatal Vit-Fe Fumarate-FA (PRENATAL COMPLETE PO), Take  by mouth., Disp: , Rfl:   The following portions of the patient's history were reviewed and updated as appropriate: allergies, current medications, past family history, past medical history, past social history, past surgical history and problem list.    Review of Systems   Constitutional: Negative for chills " and fever.   HENT: Positive for sneezing and sore throat.    Eyes: Negative for pain and redness.   Respiratory: Negative for cough and shortness of breath.    Cardiovascular: Negative for chest pain and leg swelling.   Gastrointestinal: Positive for diarrhea and vomiting.   Neurological: Positive for headaches. Negative for dizziness.       Objective   Physical Exam   Constitutional: She is oriented to person, place, and time. She appears well-developed.      HENT:   Head: Normocephalic and atraumatic.   Right Ear: Tympanic membrane, external ear and ear canal normal.   Left Ear: Tympanic membrane, external ear and ear canal normal.   Nose: Mucosal edema and rhinorrhea present. Right sinus exhibits no maxillary sinus tenderness and no frontal sinus tenderness. Left sinus exhibits no maxillary sinus tenderness and no frontal sinus tenderness.   Mouth/Throat: Uvula is midline and mucous membranes are normal. Posterior oropharyngeal erythema present.   Eyes: Pupils are equal, round, and reactive to light. Conjunctivae and EOM are normal. Right eye exhibits no discharge. Left eye exhibits no discharge. No scleral icterus.   Neck: Neck supple. No JVD present.   Cardiovascular: Normal rate, regular rhythm and normal heart sounds. Exam reveals no gallop and no friction rub.   No murmur heard.  Pulmonary/Chest: Effort normal and breath sounds normal. She has no wheezes. She has no rales.   Abdominal: She exhibits distension. There is no tenderness.   Musculoskeletal: She exhibits no edema.   Lymphadenopathy:     She has no cervical adenopathy.   Neurological: She is alert and oriented to person, place, and time. No cranial nerve deficit.   Skin: Skin is warm and dry. No rash noted.   Psychiatric: She has a normal mood and affect. Her behavior is normal.   Vitals reviewed.      Assessment/Plan   Desirae was seen today for flu symptoms.    Diagnoses and all orders for this visit:    Diarrhea of presumed infectious  origin  -     Basic Metabolic Panel  -     CBC & Differential    Non-intractable vomiting with nausea, unspecified vomiting type  -     Basic Metabolic Panel  -     CBC & Differential  -     promethazine (PHENERGAN) 12.5 MG tablet; Take 1 tablet by mouth Every 6 (Six) Hours As Needed for Nausea or Vomiting.      Acute onset of febrile illness with sore throat, body aches, nausea and vomiting and diarrhea - most likely viral, flu swab is negative. Patient is not interested in checking CBC  and CMP.HYdrate well. Will use Phenergan for nausea.

## 2020-01-06 ENCOUNTER — ROUTINE PRENATAL (OUTPATIENT)
Dept: OBSTETRICS AND GYNECOLOGY | Age: 20
End: 2020-01-06

## 2020-01-06 VITALS — WEIGHT: 179 LBS | DIASTOLIC BLOOD PRESSURE: 70 MMHG | SYSTOLIC BLOOD PRESSURE: 124 MMHG | BODY MASS INDEX: 29.79 KG/M2

## 2020-01-06 DIAGNOSIS — Z13.89 SCREENING FOR HEMATURIA OR PROTEINURIA: Primary | ICD-10-CM

## 2020-01-06 DIAGNOSIS — Z34.90 PREGNANCY, UNSPECIFIED GESTATIONAL AGE: ICD-10-CM

## 2020-01-06 DIAGNOSIS — D50.8 OTHER IRON DEFICIENCY ANEMIA: ICD-10-CM

## 2020-01-06 DIAGNOSIS — F41.9 ANXIETY: ICD-10-CM

## 2020-01-06 LAB
GLUCOSE UR STRIP-MCNC: NEGATIVE MG/DL
PROT UR STRIP-MCNC: ABNORMAL MG/DL

## 2020-01-06 PROCEDURE — 81002 URINALYSIS NONAUTO W/O SCOPE: CPT | Performed by: OBSTETRICS & GYNECOLOGY

## 2020-01-06 PROCEDURE — 0502F SUBSEQUENT PRENATAL CARE: CPT | Performed by: OBSTETRICS & GYNECOLOGY

## 2020-01-06 NOTE — PROGRESS NOTES
The patient has no complaints.  Positive fetal movement.  No contractions or vaginal bleeding.    Doppler tones are positive.  Baby is vertex by Leopold's.  Blood pressure 124/70 with trace protein  Weight gain for pregnancy is high.  Fundal height is appropriate.  3-hour glucose tolerance test was normal.    Assessment-33 weeks  High weight gain but normal fetal weight on ultrasound.  Reflux-continue Nexium  Patient has picked at her pediatrician.  Anxiety-patient notes this week was a little bit more difficult after her baby shower due to getting a lot of things.  She is managing however.  Anemia-continue iron twice daily.

## 2020-01-27 ENCOUNTER — PROCEDURE VISIT (OUTPATIENT)
Dept: OBSTETRICS AND GYNECOLOGY | Age: 20
End: 2020-01-27

## 2020-01-27 ENCOUNTER — ROUTINE PRENATAL (OUTPATIENT)
Dept: OBSTETRICS AND GYNECOLOGY | Age: 20
End: 2020-01-27

## 2020-01-27 VITALS — SYSTOLIC BLOOD PRESSURE: 118 MMHG | WEIGHT: 183 LBS | DIASTOLIC BLOOD PRESSURE: 66 MMHG | BODY MASS INDEX: 30.45 KG/M2

## 2020-01-27 DIAGNOSIS — Z13.89 SCREENING FOR HEMATURIA OR PROTEINURIA: Primary | ICD-10-CM

## 2020-01-27 DIAGNOSIS — Z36.85 ANTENATAL SCREENING FOR STREPTOCOCCUS B: ICD-10-CM

## 2020-01-27 DIAGNOSIS — Z3A.36 36 WEEKS GESTATION OF PREGNANCY: ICD-10-CM

## 2020-01-27 DIAGNOSIS — D50.8 OTHER IRON DEFICIENCY ANEMIA: Primary | ICD-10-CM

## 2020-01-27 DIAGNOSIS — Z36.89 ENCOUNTER FOR ULTRASOUND TO ASSESS INTERVAL GROWTH OF FETUS: ICD-10-CM

## 2020-01-27 LAB
GLUCOSE UR STRIP-MCNC: NEGATIVE MG/DL
PROT UR STRIP-MCNC: ABNORMAL MG/DL

## 2020-01-27 PROCEDURE — 76816 OB US FOLLOW-UP PER FETUS: CPT | Performed by: OBSTETRICS & GYNECOLOGY

## 2020-01-27 PROCEDURE — 81002 URINALYSIS NONAUTO W/O SCOPE: CPT | Performed by: OBSTETRICS & GYNECOLOGY

## 2020-01-27 PROCEDURE — 0502F SUBSEQUENT PRENATAL CARE: CPT | Performed by: OBSTETRICS & GYNECOLOGY

## 2020-01-27 NOTE — PROGRESS NOTES
The patient is feeling good fetal movements.  No vaginal bleeding.  She is feeling a bit anxious as it gets closer to delivery time.    Group B strep swab was collected.  Cervix is closed but 50% effaced.  Blood pressure 118/66 with trace protein  Fundal height is slightly low at 34 cm.  Due to low fundal height ultrasound is obtained.  Estimated fetal weight is 6 pounds 2 ounces at the 35th percentile.  Abdominal circumference is at the 30th percentile.  KHUSHBU is 17.  Baby is vertex.    Assessment-36 weeks  Normal weight on ultrasound today.  Suspect low fundal height due to the baby being engaged in the pelvis.  Reflux-continue Nexium  Anemia-continue iron twice daily  Follow-up weekly.  Recommend kick counts.

## 2020-01-29 PROBLEM — O99.820 GBS (GROUP B STREPTOCOCCUS CARRIER), +RV CULTURE, CURRENTLY PREGNANT: Status: ACTIVE | Noted: 2020-01-29

## 2020-01-29 LAB — GP B STREP DNA SPEC QL NAA+PROBE: POSITIVE

## 2020-01-30 ENCOUNTER — TELEPHONE (OUTPATIENT)
Dept: OBSTETRICS AND GYNECOLOGY | Age: 20
End: 2020-01-30

## 2020-01-30 NOTE — TELEPHONE ENCOUNTER
----- Message from Nguyễn Yap MD sent at 1/29/2020  7:00 PM EST -----  Please notify pt she is GBS positive. She will need treatment in labor.

## 2020-02-03 ENCOUNTER — ROUTINE PRENATAL (OUTPATIENT)
Dept: OBSTETRICS AND GYNECOLOGY | Age: 20
End: 2020-02-03

## 2020-02-03 VITALS — WEIGHT: 183 LBS | SYSTOLIC BLOOD PRESSURE: 134 MMHG | DIASTOLIC BLOOD PRESSURE: 80 MMHG | BODY MASS INDEX: 30.45 KG/M2

## 2020-02-03 DIAGNOSIS — Z34.90 PREGNANCY, UNSPECIFIED GESTATIONAL AGE: ICD-10-CM

## 2020-02-03 DIAGNOSIS — D50.8 OTHER IRON DEFICIENCY ANEMIA: ICD-10-CM

## 2020-02-03 DIAGNOSIS — Z13.89 SCREENING FOR HEMATURIA OR PROTEINURIA: Primary | ICD-10-CM

## 2020-02-03 DIAGNOSIS — O99.820 GBS (GROUP B STREPTOCOCCUS CARRIER), +RV CULTURE, CURRENTLY PREGNANT: ICD-10-CM

## 2020-02-03 LAB
GLUCOSE UR STRIP-MCNC: NEGATIVE MG/DL
PROT UR STRIP-MCNC: ABNORMAL MG/DL

## 2020-02-03 PROCEDURE — 0502F SUBSEQUENT PRENATAL CARE: CPT | Performed by: OBSTETRICS & GYNECOLOGY

## 2020-02-03 PROCEDURE — 81002 URINALYSIS NONAUTO W/O SCOPE: CPT | Performed by: OBSTETRICS & GYNECOLOGY

## 2020-02-03 NOTE — PROGRESS NOTES
The patient notes good fetal movement.  Her nausea is controlled with taking the Nexium.  No vaginal bleeding or contractions.  She did have a headache this morning but resolved with Tylenol.    Cervix is 2 cm 60% effaced and -2 station.  Group B strep swab is positive  Blood pressure is 134/80 with trace protein.  Fundal height is slightly low at 36 cm.  Recent ultrasound showed normal weight at the 35th percentile.    Assessment-37 weeks  Blood pressure is slightly higher than in previous visits but still in normal range.  We reviewed signs and symptoms of preeclampsia to watch for.  Reflux-continue Nexium  Anemia-continue iron twice daily  Recommend kick counts and labor warnings given.  Follow-up in 1 week.

## 2020-02-10 ENCOUNTER — ROUTINE PRENATAL (OUTPATIENT)
Dept: OBSTETRICS AND GYNECOLOGY | Age: 20
End: 2020-02-10

## 2020-02-10 ENCOUNTER — TELEPHONE (OUTPATIENT)
Dept: OBSTETRICS AND GYNECOLOGY | Age: 20
End: 2020-02-10

## 2020-02-10 VITALS — WEIGHT: 183 LBS | SYSTOLIC BLOOD PRESSURE: 124 MMHG | BODY MASS INDEX: 30.45 KG/M2 | DIASTOLIC BLOOD PRESSURE: 80 MMHG

## 2020-02-10 DIAGNOSIS — D50.8 OTHER IRON DEFICIENCY ANEMIA: ICD-10-CM

## 2020-02-10 DIAGNOSIS — Z34.90 PREGNANCY, UNSPECIFIED GESTATIONAL AGE: ICD-10-CM

## 2020-02-10 DIAGNOSIS — O99.820 GBS (GROUP B STREPTOCOCCUS CARRIER), +RV CULTURE, CURRENTLY PREGNANT: ICD-10-CM

## 2020-02-10 DIAGNOSIS — Z13.89 SCREENING FOR HEMATURIA OR PROTEINURIA: Primary | ICD-10-CM

## 2020-02-10 DIAGNOSIS — J45.40 MODERATE PERSISTENT ASTHMA WITHOUT COMPLICATION: ICD-10-CM

## 2020-02-10 LAB
GLUCOSE UR STRIP-MCNC: NEGATIVE MG/DL
PROT UR STRIP-MCNC: NEGATIVE MG/DL

## 2020-02-10 PROCEDURE — 81002 URINALYSIS NONAUTO W/O SCOPE: CPT | Performed by: OBSTETRICS & GYNECOLOGY

## 2020-02-10 PROCEDURE — 0502F SUBSEQUENT PRENATAL CARE: CPT | Performed by: OBSTETRICS & GYNECOLOGY

## 2020-02-10 NOTE — TELEPHONE ENCOUNTER
Patient called and went to the restroom after being examined today.  She had blood in her urine and when wiping.  She states it is dark blood.  She is concerned and wanted to know what to do.

## 2020-02-10 NOTE — TELEPHONE ENCOUNTER
Pt notd that some blood when wiping after an exam can be normal. Pt will call back if bleeding becomes heavy.

## 2020-02-10 NOTE — PROGRESS NOTES
The patient is feeling good fetal movements.  No regular contractions or vaginal bleeding.  She is using her inhalers.  She has noticed her asthma has been a little bit worse but not bad today.  She is taking her iron and her Nexium.    Cervix is 2 cm 60% effaced and -2 station.  Group B strep swab is positive.  Blood pressure is improved at 124/80 with a negative protein.  Fundal height is appropriate.    Assessment-38 weeks 4 days  We discussed delivery timing.  Offered induction at 39 weeks this Friday.  Patient declines induction at that point but would like to consider induction next week.  She will return here on Monday and I will go ahead and put the patient on the schedule for next Tuesday the 18th.  Asthma-continue inhalers.  Encouraged the patient go to labor and delivery for a breathing treatment if needed.  Recommend Pneumovax.  Reflux-continue Nexium  Anemia-continue iron twice daily  Recommend kick counts.

## 2020-02-17 ENCOUNTER — ROUTINE PRENATAL (OUTPATIENT)
Dept: OBSTETRICS AND GYNECOLOGY | Age: 20
End: 2020-02-17

## 2020-02-17 VITALS — BODY MASS INDEX: 30.62 KG/M2 | WEIGHT: 184 LBS | SYSTOLIC BLOOD PRESSURE: 138 MMHG | DIASTOLIC BLOOD PRESSURE: 82 MMHG

## 2020-02-17 DIAGNOSIS — Z34.90 PREGNANCY, UNSPECIFIED GESTATIONAL AGE: ICD-10-CM

## 2020-02-17 DIAGNOSIS — O99.820 GBS (GROUP B STREPTOCOCCUS CARRIER), +RV CULTURE, CURRENTLY PREGNANT: ICD-10-CM

## 2020-02-17 DIAGNOSIS — D50.8 OTHER IRON DEFICIENCY ANEMIA: ICD-10-CM

## 2020-02-17 DIAGNOSIS — Z13.89 SCREENING FOR HEMATURIA OR PROTEINURIA: Primary | ICD-10-CM

## 2020-02-17 LAB
GLUCOSE UR STRIP-MCNC: NEGATIVE MG/DL
PROT UR STRIP-MCNC: NEGATIVE MG/DL

## 2020-02-17 PROCEDURE — 81002 URINALYSIS NONAUTO W/O SCOPE: CPT | Performed by: OBSTETRICS & GYNECOLOGY

## 2020-02-17 PROCEDURE — 0502F SUBSEQUENT PRENATAL CARE: CPT | Performed by: OBSTETRICS & GYNECOLOGY

## 2020-02-17 NOTE — PROGRESS NOTES
The patient is feeling good fetal movements.  She is scheduled for induction tomorrow.  She is having a little bit of anxiety.    Cervix is 2 to 3 cm 70% effaced and -2 station.  Group B strep swab is positive.  Blood pressure 138/82 with no protein.  Doppler tones are positive and fundal height is appropriate.    Assessment-39 weeks 4 days  Plan for induction of labor tomorrow.  Cervix is favorable.  Asthma-continue inhalers  Reflux-continue Nexium  Anemia-continue iron.

## 2020-02-18 ENCOUNTER — ANESTHESIA (OUTPATIENT)
Dept: LABOR AND DELIVERY | Facility: HOSPITAL | Age: 20
End: 2020-02-18

## 2020-02-18 ENCOUNTER — HOSPITAL ENCOUNTER (OUTPATIENT)
Dept: LABOR AND DELIVERY | Facility: HOSPITAL | Age: 20
Discharge: HOME | End: 2020-02-18

## 2020-02-18 ENCOUNTER — ANESTHESIA EVENT (OUTPATIENT)
Dept: LABOR AND DELIVERY | Facility: HOSPITAL | Age: 20
End: 2020-02-18

## 2020-02-18 ENCOUNTER — HOSPITAL ENCOUNTER (INPATIENT)
Facility: HOSPITAL | Age: 20
LOS: 2 days | Discharge: HOME | End: 2020-02-20
Attending: OBSTETRICS & GYNECOLOGY | Admitting: OBSTETRICS & GYNECOLOGY

## 2020-02-18 DIAGNOSIS — J45.40 MODERATE PERSISTENT ASTHMA WITHOUT COMPLICATION: Primary | ICD-10-CM

## 2020-02-18 DIAGNOSIS — D50.8 OTHER IRON DEFICIENCY ANEMIA: ICD-10-CM

## 2020-02-18 DIAGNOSIS — O13.9 GESTATIONAL HYPERTENSION, ANTEPARTUM: ICD-10-CM

## 2020-02-18 LAB
ABO GROUP BLD: NORMAL
AMPHET+METHAMPHET UR QL: NEGATIVE
BARBITURATES UR QL SCN: NEGATIVE
BASOPHILS # BLD AUTO: 0.04 10*3/MM3 (ref 0–0.2)
BASOPHILS NFR BLD AUTO: 0.4 % (ref 0–1.5)
BENZODIAZ UR QL SCN: NEGATIVE
BLD GP AB SCN SERPL QL: NEGATIVE
CANNABINOIDS SERPL QL: NEGATIVE
COCAINE UR QL: NEGATIVE
DEPRECATED RDW RBC AUTO: 41.7 FL (ref 37–54)
EOSINOPHIL # BLD AUTO: 0.54 10*3/MM3 (ref 0–0.4)
EOSINOPHIL NFR BLD AUTO: 6 % (ref 0.3–6.2)
ERYTHROCYTE [DISTWIDTH] IN BLOOD BY AUTOMATED COUNT: 13.2 % (ref 12.3–15.4)
HCT VFR BLD AUTO: 33.9 % (ref 34–46.6)
HGB BLD-MCNC: 11.4 G/DL (ref 12–15.9)
IMM GRANULOCYTES # BLD AUTO: 0.06 10*3/MM3 (ref 0–0.05)
IMM GRANULOCYTES NFR BLD AUTO: 0.7 % (ref 0–0.5)
LYMPHOCYTES # BLD AUTO: 1.82 10*3/MM3 (ref 0.7–3.1)
LYMPHOCYTES NFR BLD AUTO: 20.1 % (ref 19.6–45.3)
MCH RBC QN AUTO: 28.8 PG (ref 26.6–33)
MCHC RBC AUTO-ENTMCNC: 33.6 G/DL (ref 31.5–35.7)
MCV RBC AUTO: 85.6 FL (ref 79–97)
METHADONE UR QL SCN: NEGATIVE
MONOCYTES # BLD AUTO: 0.52 10*3/MM3 (ref 0.1–0.9)
MONOCYTES NFR BLD AUTO: 5.7 % (ref 5–12)
NEUTROPHILS # BLD AUTO: 6.09 10*3/MM3 (ref 1.7–7)
NEUTROPHILS NFR BLD AUTO: 67.1 % (ref 42.7–76)
NRBC BLD AUTO-RTO: 0 /100 WBC (ref 0–0.2)
OPIATES UR QL: NEGATIVE
OXYCODONE UR QL SCN: NEGATIVE
PLATELET # BLD AUTO: 131 10*3/MM3 (ref 140–450)
PMV BLD AUTO: 12.2 FL (ref 6–12)
RBC # BLD AUTO: 3.96 10*6/MM3 (ref 3.77–5.28)
RH BLD: POSITIVE
T&S EXPIRATION DATE: NORMAL
WBC NRBC COR # BLD: 9.07 10*3/MM3 (ref 3.4–10.8)

## 2020-02-18 PROCEDURE — 80307 DRUG TEST PRSMV CHEM ANLYZR: CPT | Performed by: OBSTETRICS & GYNECOLOGY

## 2020-02-18 PROCEDURE — 94799 UNLISTED PULMONARY SVC/PX: CPT

## 2020-02-18 PROCEDURE — C1755 CATHETER, INTRASPINAL: HCPCS | Performed by: ANESTHESIOLOGY

## 2020-02-18 PROCEDURE — 25010000002 ROPIVACAINE PER 1 MG: Performed by: ANESTHESIOLOGY

## 2020-02-18 PROCEDURE — 86900 BLOOD TYPING SEROLOGIC ABO: CPT | Performed by: OBSTETRICS & GYNECOLOGY

## 2020-02-18 PROCEDURE — C1755 CATHETER, INTRASPINAL: HCPCS

## 2020-02-18 PROCEDURE — 59400 OBSTETRICAL CARE: CPT | Performed by: OBSTETRICS & GYNECOLOGY

## 2020-02-18 PROCEDURE — 86850 RBC ANTIBODY SCREEN: CPT | Performed by: OBSTETRICS & GYNECOLOGY

## 2020-02-18 PROCEDURE — 86901 BLOOD TYPING SEROLOGIC RH(D): CPT | Performed by: OBSTETRICS & GYNECOLOGY

## 2020-02-18 PROCEDURE — 25010000002 FENTANYL CITRATE (PF) 2500 MCG/50ML SOLUTION: Performed by: ANESTHESIOLOGY

## 2020-02-18 PROCEDURE — 25010000002 ONDANSETRON PER 1 MG: Performed by: OBSTETRICS & GYNECOLOGY

## 2020-02-18 PROCEDURE — 0HQ9XZZ REPAIR PERINEUM SKIN, EXTERNAL APPROACH: ICD-10-PCS | Performed by: OBSTETRICS & GYNECOLOGY

## 2020-02-18 PROCEDURE — 25010000002 PENICILLIN G POTASSIUM PER 600000 UNITS: Performed by: OBSTETRICS & GYNECOLOGY

## 2020-02-18 PROCEDURE — 85025 COMPLETE CBC W/AUTO DIFF WBC: CPT | Performed by: OBSTETRICS & GYNECOLOGY

## 2020-02-18 PROCEDURE — 94640 AIRWAY INHALATION TREATMENT: CPT

## 2020-02-18 PROCEDURE — 3E033VJ INTRODUCTION OF OTHER HORMONE INTO PERIPHERAL VEIN, PERCUTANEOUS APPROACH: ICD-10-PCS | Performed by: OBSTETRICS & GYNECOLOGY

## 2020-02-18 PROCEDURE — 10907ZC DRAINAGE OF AMNIOTIC FLUID, THERAPEUTIC FROM PRODUCTS OF CONCEPTION, VIA NATURAL OR ARTIFICIAL OPENING: ICD-10-PCS | Performed by: OBSTETRICS & GYNECOLOGY

## 2020-02-18 PROCEDURE — 88307 TISSUE EXAM BY PATHOLOGIST: CPT

## 2020-02-18 RX ORDER — LIDOCAINE HYDROCHLORIDE 10 MG/ML
INJECTION, SOLUTION EPIDURAL; INFILTRATION; INTRACAUDAL; PERINEURAL AS NEEDED
Status: DISCONTINUED | OUTPATIENT
Start: 2020-02-18 | End: 2020-02-18 | Stop reason: SURG

## 2020-02-18 RX ORDER — OXYTOCIN-SODIUM CHLORIDE 0.9% IV SOLN 30 UNIT/500ML 30-0.9/5 UT/ML-%
250 SOLUTION INTRAVENOUS CONTINUOUS
Status: ACTIVE | OUTPATIENT
Start: 2020-02-18 | End: 2020-02-18

## 2020-02-18 RX ORDER — TERBUTALINE SULFATE 1 MG/ML
0.25 INJECTION, SOLUTION SUBCUTANEOUS AS NEEDED
Status: DISCONTINUED | OUTPATIENT
Start: 2020-02-18 | End: 2020-02-18 | Stop reason: HOSPADM

## 2020-02-18 RX ORDER — OXYTOCIN-SODIUM CHLORIDE 0.9% IV SOLN 30 UNIT/500ML 30-0.9/5 UT/ML-%
2-20 SOLUTION INTRAVENOUS
Status: DISCONTINUED | OUTPATIENT
Start: 2020-02-18 | End: 2020-02-19

## 2020-02-18 RX ORDER — EPHEDRINE SULFATE 50 MG/ML
5 INJECTION, SOLUTION INTRAVENOUS AS NEEDED
Status: DISCONTINUED | OUTPATIENT
Start: 2020-02-18 | End: 2020-02-18 | Stop reason: HOSPADM

## 2020-02-18 RX ORDER — FAMOTIDINE 20 MG/1
20 TABLET, FILM COATED ORAL EVERY 12 HOURS PRN
Status: DISCONTINUED | OUTPATIENT
Start: 2020-02-18 | End: 2020-02-18 | Stop reason: HOSPADM

## 2020-02-18 RX ORDER — ERYTHROMYCIN 5 MG/G
OINTMENT OPHTHALMIC
Status: DISPENSED
Start: 2020-02-18 | End: 2020-02-19

## 2020-02-18 RX ORDER — ONDANSETRON 2 MG/ML
4 INJECTION INTRAMUSCULAR; INTRAVENOUS ONCE AS NEEDED
Status: DISCONTINUED | OUTPATIENT
Start: 2020-02-18 | End: 2020-02-18 | Stop reason: HOSPADM

## 2020-02-18 RX ORDER — OXYCODONE HYDROCHLORIDE AND ACETAMINOPHEN 5; 325 MG/1; MG/1
1 TABLET ORAL EVERY 4 HOURS PRN
Status: DISCONTINUED | OUTPATIENT
Start: 2020-02-18 | End: 2020-02-20 | Stop reason: HOSPADM

## 2020-02-18 RX ORDER — ACETAMINOPHEN 325 MG/1
650 TABLET ORAL EVERY 4 HOURS PRN
Status: DISCONTINUED | OUTPATIENT
Start: 2020-02-18 | End: 2020-02-18 | Stop reason: HOSPADM

## 2020-02-18 RX ORDER — PENICILLIN G 3000000 [IU]/50ML
3 INJECTION, SOLUTION INTRAVENOUS EVERY 4 HOURS
Status: DISCONTINUED | OUTPATIENT
Start: 2020-02-18 | End: 2020-02-18 | Stop reason: HOSPADM

## 2020-02-18 RX ORDER — CARBOPROST TROMETHAMINE 250 UG/ML
250 INJECTION, SOLUTION INTRAMUSCULAR AS NEEDED
Status: DISCONTINUED | OUTPATIENT
Start: 2020-02-18 | End: 2020-02-18 | Stop reason: HOSPADM

## 2020-02-18 RX ORDER — SODIUM CHLORIDE, SODIUM LACTATE, POTASSIUM CHLORIDE, CALCIUM CHLORIDE 600; 310; 30; 20 MG/100ML; MG/100ML; MG/100ML; MG/100ML
125 INJECTION, SOLUTION INTRAVENOUS CONTINUOUS
Status: DISCONTINUED | OUTPATIENT
Start: 2020-02-18 | End: 2020-02-19

## 2020-02-18 RX ORDER — METHYLERGONOVINE MALEATE 0.2 MG/ML
200 INJECTION INTRAVENOUS ONCE AS NEEDED
Status: DISCONTINUED | OUTPATIENT
Start: 2020-02-18 | End: 2020-02-18 | Stop reason: HOSPADM

## 2020-02-18 RX ORDER — EPHEDRINE SULFATE 50 MG/ML
10 INJECTION, SOLUTION INTRAVENOUS AS NEEDED
Status: DISCONTINUED | OUTPATIENT
Start: 2020-02-18 | End: 2020-02-18 | Stop reason: HOSPADM

## 2020-02-18 RX ORDER — MISOPROSTOL 200 UG/1
800 TABLET ORAL AS NEEDED
Status: DISCONTINUED | OUTPATIENT
Start: 2020-02-18 | End: 2020-02-18 | Stop reason: HOSPADM

## 2020-02-18 RX ORDER — OXYTOCIN-SODIUM CHLORIDE 0.9% IV SOLN 30 UNIT/500ML 30-0.9/5 UT/ML-%
125 SOLUTION INTRAVENOUS CONTINUOUS PRN
Status: COMPLETED | OUTPATIENT
Start: 2020-02-18 | End: 2020-02-18

## 2020-02-18 RX ORDER — OXYCODONE HYDROCHLORIDE AND ACETAMINOPHEN 5; 325 MG/1; MG/1
2 TABLET ORAL EVERY 4 HOURS PRN
Status: DISCONTINUED | OUTPATIENT
Start: 2020-02-18 | End: 2020-02-20 | Stop reason: HOSPADM

## 2020-02-18 RX ORDER — LABETALOL 100 MG/1
100 TABLET, FILM COATED ORAL EVERY 12 HOURS SCHEDULED
Status: DISCONTINUED | OUTPATIENT
Start: 2020-02-18 | End: 2020-02-20 | Stop reason: HOSPADM

## 2020-02-18 RX ORDER — OXYTOCIN-SODIUM CHLORIDE 0.9% IV SOLN 30 UNIT/500ML 30-0.9/5 UT/ML-%
999 SOLUTION INTRAVENOUS ONCE
Status: COMPLETED | OUTPATIENT
Start: 2020-02-18 | End: 2020-02-18

## 2020-02-18 RX ORDER — LIDOCAINE HYDROCHLORIDE AND EPINEPHRINE 15; 5 MG/ML; UG/ML
INJECTION, SOLUTION EPIDURAL AS NEEDED
Status: DISCONTINUED | OUTPATIENT
Start: 2020-02-18 | End: 2020-02-18 | Stop reason: SURG

## 2020-02-18 RX ORDER — PHYTONADIONE 1 MG/.5ML
INJECTION, EMULSION INTRAMUSCULAR; INTRAVENOUS; SUBCUTANEOUS
Status: DISPENSED
Start: 2020-02-18 | End: 2020-02-19

## 2020-02-18 RX ORDER — DIPHENHYDRAMINE HYDROCHLORIDE 50 MG/ML
12.5 INJECTION INTRAMUSCULAR; INTRAVENOUS EVERY 8 HOURS PRN
Status: DISCONTINUED | OUTPATIENT
Start: 2020-02-18 | End: 2020-02-18 | Stop reason: HOSPADM

## 2020-02-18 RX ORDER — IBUPROFEN 600 MG/1
600 TABLET ORAL EVERY 8 HOURS PRN
Status: DISCONTINUED | OUTPATIENT
Start: 2020-02-18 | End: 2020-02-20 | Stop reason: HOSPADM

## 2020-02-18 RX ORDER — FAMOTIDINE 10 MG/ML
20 INJECTION, SOLUTION INTRAVENOUS EVERY 12 HOURS PRN
Status: DISCONTINUED | OUTPATIENT
Start: 2020-02-18 | End: 2020-02-18 | Stop reason: HOSPADM

## 2020-02-18 RX ORDER — FAMOTIDINE 10 MG/ML
20 INJECTION, SOLUTION INTRAVENOUS ONCE AS NEEDED
Status: DISCONTINUED | OUTPATIENT
Start: 2020-02-18 | End: 2020-02-18 | Stop reason: HOSPADM

## 2020-02-18 RX ORDER — ONDANSETRON 2 MG/ML
4 INJECTION INTRAMUSCULAR; INTRAVENOUS EVERY 6 HOURS PRN
Status: DISCONTINUED | OUTPATIENT
Start: 2020-02-18 | End: 2020-02-18 | Stop reason: HOSPADM

## 2020-02-18 RX ORDER — ALBUTEROL SULFATE 0.63 MG/3ML
0.63 SOLUTION RESPIRATORY (INHALATION) EVERY 6 HOURS PRN
Status: DISCONTINUED | OUTPATIENT
Start: 2020-02-18 | End: 2020-02-19

## 2020-02-18 RX ORDER — ONDANSETRON 4 MG/1
4 TABLET, FILM COATED ORAL EVERY 6 HOURS PRN
Status: DISCONTINUED | OUTPATIENT
Start: 2020-02-18 | End: 2020-02-18 | Stop reason: HOSPADM

## 2020-02-18 RX ADMIN — PENICILLIN G 3 MILLION UNITS: 3000000 INJECTION, SOLUTION INTRAVENOUS at 16:29

## 2020-02-18 RX ADMIN — FAMOTIDINE 20 MG: 10 INJECTION INTRAVENOUS at 09:46

## 2020-02-18 RX ADMIN — ALBUTEROL SULFATE 0.63 MG: 0.63 SOLUTION RESPIRATORY (INHALATION) at 08:55

## 2020-02-18 RX ADMIN — ROPIVACAINE HYDROCHLORIDE 10 ML/HR: 2 INJECTION, SOLUTION EPIDURAL; INFILTRATION at 11:55

## 2020-02-18 RX ADMIN — ALBUTEROL SULFATE 0.63 MG: 0.63 SOLUTION RESPIRATORY (INHALATION) at 14:22

## 2020-02-18 RX ADMIN — LIDOCAINE HYDROCHLORIDE 5 ML: 10 INJECTION, SOLUTION EPIDURAL; INFILTRATION; INTRACAUDAL; PERINEURAL at 11:51

## 2020-02-18 RX ADMIN — ROPIVACAINE HYDROCHLORIDE 10 ML/HR: 2 INJECTION, SOLUTION EPIDURAL; INFILTRATION at 18:04

## 2020-02-18 RX ADMIN — IBUPROFEN 600 MG: 600 TABLET, FILM COATED ORAL at 20:59

## 2020-02-18 RX ADMIN — OXYTOCIN 2 MILLI-UNITS/MIN: 10 INJECTION, SOLUTION INTRAMUSCULAR; INTRAVENOUS at 08:46

## 2020-02-18 RX ADMIN — SODIUM CHLORIDE 5 MILLION UNITS: 900 INJECTION INTRAVENOUS at 08:15

## 2020-02-18 RX ADMIN — OXYCODONE HYDROCHLORIDE AND ACETAMINOPHEN 1 TABLET: 5; 325 TABLET ORAL at 21:20

## 2020-02-18 RX ADMIN — ONDANSETRON 4 MG: 2 INJECTION INTRAMUSCULAR; INTRAVENOUS at 14:42

## 2020-02-18 RX ADMIN — SODIUM CHLORIDE, POTASSIUM CHLORIDE, SODIUM LACTATE AND CALCIUM CHLORIDE 1000 ML: 600; 310; 30; 20 INJECTION, SOLUTION INTRAVENOUS at 12:05

## 2020-02-18 RX ADMIN — OXYTOCIN 125 ML/HR: 10 INJECTION, SOLUTION INTRAMUSCULAR; INTRAVENOUS at 20:52

## 2020-02-18 RX ADMIN — LABETALOL HCL 100 MG: 100 TABLET, FILM COATED ORAL at 22:11

## 2020-02-18 RX ADMIN — LIDOCAINE HYDROCHLORIDE AND EPINEPHRINE 3 ML: 15; 5 INJECTION, SOLUTION EPIDURAL at 11:47

## 2020-02-18 RX ADMIN — PENICILLIN G 3 MILLION UNITS: 3000000 INJECTION, SOLUTION INTRAVENOUS at 12:15

## 2020-02-18 RX ADMIN — SODIUM CHLORIDE, POTASSIUM CHLORIDE, SODIUM LACTATE AND CALCIUM CHLORIDE 125 ML/HR: 600; 310; 30; 20 INJECTION, SOLUTION INTRAVENOUS at 07:30

## 2020-02-18 RX ADMIN — OXYTOCIN 999 ML/HR: 10 INJECTION, SOLUTION INTRAMUSCULAR; INTRAVENOUS at 19:37

## 2020-02-18 NOTE — ANESTHESIA PREPROCEDURE EVALUATION
Anesthesia Evaluation     no history of anesthetic complications:               Airway   Mallampati: II  Small opening  Dental      Pulmonary    (+) asthma,  Cardiovascular         Neuro/Psych  (+) psychiatric history Anxiety,     GI/Hepatic/Renal/Endo      Musculoskeletal     Abdominal    Substance History      OB/GYN      Comment: 39 wks, 5 days      Other                        Anesthesia Plan    ASA 2     epidural       Anesthetic plan, all risks, benefits, and alternatives have been provided, discussed and informed consent has been obtained with: patient and spouse/significant other.

## 2020-02-18 NOTE — H&P
[facility]  Obstetric History and Physical    Chief Complaint   Patient presents with   • Scheduled Induction     Term IOL, + fetal movement, occasional contractions       Subjective      Patient is a 19 y.o. female  currently at 39w5d, who presents for term induction of labor.  Patient has asthma and does feel some chest tightness this morning.  She used her inhaler but still feels some tightness.  Respiratory therapy has been called to do a respiratory treatment.  Patient has been started on her antibiotics for GBS.    Her prenatal care is complicated by asthma, anxiety and iron deficiency anemia.  Her previous obstetric/gynecological history is noted for history of substance abuse with Adderall, Xanax and OxyContin.  Patient also has a history of deliberate self cutting    The following portions of the patients history were reviewed and updated as appropriate: current medications, allergies, past medical history, past surgical history, past family history, past social history and problem list .       Prenatal Information:  Prenatal Results     POC Urine Glucose/Protein     Test Value Reference Range Date Time    Urine Glucose Negative mg/dL Negative, 1000 mg/dL (3+) 20 1351    Urine Protein Negative mg/dL Negative 20 1351          Initial Prenatal Labs     Test Value Reference Range Date Time    Hemoglobin 10.5 g/dL 12.0 - 15.9 19 1407      12.6 g/dL 11.1 - 15.9 19 0948    Hematocrit 31.8 % 34.0 - 46.6 19 1407      36.4 % 34.0 - 46.6 19 0948    Platelets 131 10*3/mm3 140 - 450 20 0739      177 10*3/mm3 140 - 450 19 1305      195 10*3/mm3 140 - 450 19 1303      160 10*3/mm3 140 - 450 19 1407      179 x10E3/uL 150 - 450 19 0948    Rubella IgG 3.33 index Immune >0.99 19 0948    Hepatitis B SAg Negative  Negative 1948    Hepatitis C Ab 0.1 s/co ratio 0.0 - 0.9 19 0948    RPR Non Reactive  Non Reactive 19    ABO B    02/18/20 0739    Rh Positive   02/18/20 0739    Antibody Screen Negative  Negative 07/09/19 0948    HIV Non Reactive  Non Reactive 07/09/19 0948    Urine Culture Final report   07/09/19 0857    Gonorrhea        Chlamydia        TSH              2nd and 3rd Trimester     Test Value Reference Range Date Time    Hemoglobin (repeated) 11.4 g/dL 12.0 - 15.9 02/18/20 0739      10.3 g/dL 12.0 - 15.9 12/05/19 1305      10.9 g/dL 12.0 - 15.9 11/12/19 1303    Hematocrit (repeated) 33.9 % 34.0 - 46.6 02/18/20 0739      30.9 % 34.0 - 46.6 12/05/19 1305      32.7 % 34.0 - 46.6 11/12/19 1303     mg/dL 65 - 179 12/05/19 1305    Antibody Screen (repeated) Negative   02/18/20 0739    GTT Fasting 80 mg/dL 65 - 94 12/20/19 0854    GTT 1 Hr 126 mg/dL 65 - 179 12/20/19 0854    GTT 2 Hr 142 mg/dL 65 - 154 12/20/19 0854    GTT 3 Hr 102 mg/dL 65 - 139 12/20/19 0854    Group B Strep Positive  Negative 01/27/20 1441          Drug Screening     Test Value Reference Range Date Time    Amphetamine Screen Negative ng/mL Meazop=5328 09/16/19 1436      Negative ng/mL Jrnvdo=1907 07/29/19 1532    Barbiturate Screen Negative ng/mL Zswgtc=766 09/16/19 1436      Negative ng/mL Jyqljr=412 07/29/19 1532    Benzodiazepine Screen Negative ng/mL Gpevcl=879 09/16/19 1436      Negative ng/mL Gmygvw=573 07/29/19 1532    Methadone Screen Negative ng/mL Krqyqh=337 09/16/19 1436      Negative ng/mL Okfouj=919 07/29/19 1532    Phencyclidine Screen Negative ng/mL Cutoff=25 09/16/19 1436      Negative ng/mL Cutoff=25 07/29/19 1532    Opiates Screen Negative ng/mL Csofxh=088 09/16/19 1436      Negative ng/mL Gyvobt=059 07/29/19 1532    THC Screen Negative ng/mL Cutoff=50 09/16/19 1436      Negative ng/mL Cutoff=50 07/29/19 1532    Cocaine Screen Negative ng/mL Osodbm=641 09/16/19 1436      Negative ng/mL Cyfmla=830 07/29/19 1532    Propoxyphene Screen Negative ng/mL Isjknw=737 09/16/19 1436      Negative ng/mL Xzvjed=602 07/29/19 1532    Buprenorphine Screen         Methamphetamine Screen        Oxycodone Screen        Tricyclic Antidepressants Screen              Other (Risk screening)     Test Value Reference Range Date Time    Varicella IgG Previously Vaccinated   07/09/19     Parvovirus IgG        CMV IgG        Cystic Fibrosis        Hemoglobin electrophoresis        NIPT        MSAFP-4        AFP (for NTD only) *Screen Negative*   09/16/19 1407              External Prenatal Results     Pregnancy Outside Results - Transcribed From Office Records - See Scanned Records For Details     Test Value Date Time    Hgb 11.4 g/dL 02/18/20 0739      10.3 g/dL 12/05/19 1305      10.9 g/dL 11/12/19 1303      10.5 g/dL 09/16/19 1407      12.6 g/dL 07/09/19 0948    Hct 33.9 % 02/18/20 0739      30.9 % 12/05/19 1305      32.7 % 11/12/19 1303      31.8 % 09/16/19 1407      36.4 % 07/09/19 0948    ABO B  02/18/20 0739    Rh Positive  02/18/20 0739    Antibody Screen Negative  02/18/20 0739      Negative  07/09/19 0948    Glucose Fasting GTT 80 mg/dL 12/20/19 0854    Glucose Tolerance Test 1 hour 126 mg/dL 12/20/19 0854    Glucose Tolerance Test 3 hour 102 mg/dL 12/20/19 0854    Gonorrhea (discrete)       Chlamydia (discrete)       RPR Non Reactive  07/09/19 0948    VDRL       Syphilis Antibody       Rubella 3.33 index 07/09/19 0948    HBsAg Negative  07/09/19 0948    Herpes Simplex Virus PCR       Herpes Simplex VIrus Culture       HIV Non Reactive  07/09/19 0948    Hep C RNA Quant PCR       Hep C Antibody 0.1 s/co ratio 07/09/19 0948    AFP 53.6 ng/mL 09/16/19 1407    Group B Strep Positive  01/27/20 1441    GBS Susceptibility to Clindamycin       GBS Susceptibility to Erythromycin       Fetal Fibronectin       Genetic Testing, Maternal Blood             Drug Screening     Test Value Date Time    Urine Drug Screen       Amphetamine Screen Negative ng/mL 09/16/19 1436      Negative ng/mL 07/29/19 1532    Barbiturate Screen Negative ng/mL 09/16/19 1436      Negative ng/mL 07/29/19  1532    Benzodiazepine Screen Negative ng/mL 19 1436      Negative ng/mL 19 1532    Methadone Screen Negative ng/mL 19 1436      Negative ng/mL 19 1532    Phencyclidine Screen Negative ng/mL 19 1436      Negative ng/mL 19 1532    Opiates Screen Negative  16 1112    THC Screen Positive  16 1112    Cocaine Screen       Propoxyphene Screen Negative ng/mL 19 1436      Negative ng/mL 19 1532    Buprenorphine Screen       Methamphetamine Screen       Oxycodone Screen Negative  16 1112    Tricyclic Antidepressants Screen                    Past OB History:     OB History    Para Term  AB Living   1 0 0 0 0 0   SAB TAB Ectopic Molar Multiple Live Births   0 0 0 0 0 0      # Outcome Date GA Lbr Edson/2nd Weight Sex Delivery Anes PTL Lv   1 Current                Past Medical History: Past Medical History:   Diagnosis Date   • Acute superficial gastritis without hemorrhage 2018   • Anxiety    • Asthma    • Deliberate self-cutting    • Depression    • Environmental allergies    • Migraine    • Migraine without aura and without status migrainosus, not intractable 2018    Maxalt, Imitrex   • Recurrent major depressive disorder, in full remission (CMS/[facility]) 2018    Celexa 0924-1450   • Substance abuse (CMS/[facility])     2017 Adderall, Xanax, oxycotin      Past Surgical History Past Surgical History:   Procedure Laterality Date   • ENDOSCOPY  2017    EGD    • RHINOPLASTY  2016   • WISDOM TOOTH EXTRACTION        Family History: Family History   Problem Relation Age of Onset   • Drug abuse Father    • Hypertension Father    • Depression Maternal Uncle    • Breast cancer Mother 38        the patient's my risk test is negative.   • Hypertension Mother    • Diabetes Maternal Grandmother    • Hypertension Maternal Grandmother    • Ovarian cancer Maternal Grandmother 24   • Heart attack Maternal Grandfather    • Hypertension Maternal  Grandfather    • Colon cancer Maternal Grandfather 59   • Hypertension Paternal Grandmother    • Diabetes Paternal Grandfather    • Melanoma Paternal Grandfather    • Hypertension Paternal Grandfather    • Breast cancer Paternal Aunt 52      Social History:  reports that she quit smoking about 8 months ago. Her smoking use included cigarettes. She has a 2.50 pack-year smoking history. She has never used smokeless tobacco.   reports that she does not drink alcohol.   reports that she does not use drugs.        General ROS: Good fetal movement, irregular cramping, no vaginal bleeding or abdominal pain.    Objective       Vital Signs Range for the last 24 hours  Temperature: Temp:  [98.6 °F (37 °C)] 98.6 °F (37 °C)   Temp Source: Temp src: Oral   BP: BP: (138)/(82) 138/82   Pulse:     Respirations:     SPO2:     O2 Amount (l/min):     O2 Devices     Weight: Weight:  [83.5 kg (184 lb)-84.6 kg (186 lb 6.4 oz)] 84.6 kg (186 lb 6.4 oz)     Physical Examination: General appearance - alert, well appearing, and in no distress  Mental status - normal mood, behavior, speech, dress, motor activity, and thought processes  Abdomen -gravid, size equal to dates and nontender  Extremities - no pedal edema noted    Presentation: vertex   Cervix: Exam by: Method: sterile exam per RN   Dilation: Cervical Dilation (cm): 3   Effacement: Cervical Effacement: 70%   Station:         Fetal Heart Rate Assessment   Method:     Beats/min:     Baseline:     Variability:     Accels:     Decels:     Tracing Category:       Uterine Assessment   Method:     Frequency (min):     Ctx Count in 10 min:     Duration:     Intensity:     Intensity by IUPC:     Resting Tone:     Resting Tone by IUPC:     Mascot Units:       Laboratory Results:   Results from last 7 days   Lab Units 02/18/20  0739   WBC 10*3/mm3 9.07   HEMOGLOBIN g/dL 11.4*   HEMATOCRIT % 33.9*   PLATELETS 10*3/mm3 131*           Assessment/Plan       Pregnancy    Moderate persistent  asthma without complication    Anxiety    Iron (Fe) deficiency anemia    GBS (group B Streptococcus carrier), +RV culture, currently pregnant        Assessment:  1.  Intrauterine pregnancy at 39w5d gestation here for induction of labor.  Soon after admission the patient did have 2 variable decelerations.  Fetal heart rate tracing currently does appear reassuring.  Continue to monitor.    2.  induction of labor  for term  with favorable cervix  3.  Obstetrical history significant for is non-contributory.  4.  GBS status:   Strep Gp B VADIM   Date Value Ref Range Status   2020 Positive (A) Negative Final     Comment:     Centers for Disease Control and Prevention (CDC) and American Congress  of Obstetricians and Gynecologists (ACOG) guidelines for prevention of   group B streptococcal (GBS) disease specify co-collection of  a vaginal and rectal swab specimen to maximize sensitivity of GBS  detection. Per the CDC and ACOG, swabbing both the lower vagina and  rectum substantially increases the yield of detection compared with  sampling the vagina alone.  Penicillin G, ampicillin, or cefazolin are indicated for intrapartum  prophylaxis of  GBS colonization. Reflex susceptibility  testing should be performed prior to use of clindamycin only on GBS  isolates from penicillin-allergic women who are considered a high risk  for anaphylaxis. Treatment with vancomycin without additional testing  is warranted if resistance to clindamycin is noted.         Plan:  1. fetal and uterine monitoring  continuously, labor augmentation  Pitocin, analgesia with  epidural and antibiotic for GBS  2. Plan of care has been reviewed with patient and family  3.  Risks, benefits of treatment plan have been discussed.  4.  All questions have been answered.        Nguyễn Yap MD  2020  @NOW@

## 2020-02-18 NOTE — ANESTHESIA PROCEDURE NOTES
Labor Epidural      Patient reassessed immediately prior to procedure    Patient location during procedure: OB  Performed By  Anesthesiologist: James Smith MD  Preanesthetic Checklist  Completed: patient identified, site marked, surgical consent, pre-op evaluation, timeout performed, IV checked, risks and benefits discussed and monitors and equipment checked  Additional Notes  Test dose 3 cc 1.5% lidocaine with epi.  Second dose 5cc 1% lidocaine then continuous infusion o.2% Ropivicaine with 2 Mics fentanyl per cc at 10 cc hr, with  6cc PCA, 15 min lockout  Prep:  Pt Position:left lateral decubitus  Sterile Tech:gloves, cap, mask and sterile barrier  Prep:chlorhexidine gluconate and isopropyl alcohol  Monitoring:blood pressure monitoring, continuous pulse oximetry and EKG  Epidural Block Procedure:  Approach:midline  Guidance:landmark technique  Location:L4-L5  Needle Type:Tuohy  Needle Gauge:18  Loss of Resistance Medium: air  Paresthesia: none  Aspiration:negative  Test Dose:negative  Number of Attempts: 1  Post Assessment:  Dressing:occlusive dressing applied and secured with tape  Pt Tolerance:patient tolerated the procedure well with no apparent complications  Complications:no

## 2020-02-18 NOTE — PLAN OF CARE
Problem: Patient Care Overview  Goal: Plan of Care Review  Outcome: Ongoing (interventions implemented as appropriate)  Flowsheets (Taken 2/18/2020 1742)  Progress: improving  Plan of Care Reviewed With: patient; family  Outcome Summary: IOL with pitocin. Patient has progressed to 8.5/100/0. Patient labor has been complicated with asthma. Patient has been receiving albuterol breathing treatments and has used her inhaler PRN. Patient receiving antibiotics for GBS positive. Meconium present in fluid.  Goal: Individualization and Mutuality  Outcome: Ongoing (interventions implemented as appropriate)  Flowsheets (Taken 2/18/2020 1742)  Patient Specific Goals (Include Timeframe): Pain management with epidural for labor  How to Address Anxieties/Fears: None  Patient Specific Interventions: SRIKANTH, breastfeeding, asthma management  How Would You and/or Your Support Person Like to Participate in Your Care?: FOB to cut cord  What Anxieties, Fears, Concerns, or Questions Do You Have About Your Care?: None  Patient Specific Preferences: Healthy vaginal delivery of baby girl.  Goal: Discharge Needs Assessment  Outcome: Ongoing (interventions implemented as appropriate)  Flowsheets  Taken 2/18/2020 1742  Equipment Needed After Discharge: none  Anticipated Changes Related to Illness: none  Transportation Concerns: car, none  Concerns to be Addressed: no discharge needs identified  Readmission Within the Last 30 Days: no previous admission in last 30 days  Taken 2/18/2020 0853  Equipment Currently Used at Home: none  Taken 2/18/2020 0851  Transportation Anticipated: car, drives self;family or friend will provide  Patient/Family Anticipated Services at Transition: none  Patient/Family Anticipates Transition to: home with family  Goal: Interprofessional Rounds/Family Conf  Outcome: Ongoing (interventions implemented as appropriate)     Problem: Labor (Cervical Ripen, Induct, Augment) (Adult,Obstetrics,Pediatric)  Goal: Signs and  Symptoms of Listed Potential Problems Will be Absent, Minimized or Managed (Labor)  Outcome: Ongoing (interventions implemented as appropriate)  Flowsheets (Taken 2020 174)  Problems Assessed (Labor): all  Problems Present (Labor): none     Problem: Fall Risk,  (Adult,Obstetrics,Pediatric)  Goal: Identify Related Risk Factors and Signs and Symptoms  Outcome: Ongoing (interventions implemented as appropriate)  Flowsheets (Taken 2020 174)  Signs and Symptoms (Fall Risk, ): presence of fall risk factors  Goal: Absence of Maternal Fall  Outcome: Ongoing (interventions implemented as appropriate)  Flowsheets (Taken 2020 174)  Absence of Maternal Fall: achieves outcome  Goal: Absence of  Fall/Drop  Outcome: Ongoing (interventions implemented as appropriate)  Flowsheets (Taken 2020)  Absence of [place] Fall/Drop: achieves outcome     Problem: Skin Injury Risk (Adult)  Goal: Identify Related Risk Factors and Signs and Symptoms  Outcome: Ongoing (interventions implemented as appropriate)  Flowsheets (Taken 2020)  Related Risk Factors (Skin Injury Risk): mobility impaired; moisture; medication; medical devices  Goal: Skin Health and Integrity  Outcome: Ongoing (interventions implemented as appropriate)  Flowsheets (Taken 2020)  Skin Health and Integrity: achieves outcome     Problem: Anesthesia/Analgesia, Neuraxial (Obstetrics)  Goal: Signs and Symptoms of Listed Potential Problems Will be Absent, Minimized or Managed (Anesthesia/Analgesia, Neuraxial)  Outcome: Ongoing (interventions implemented as appropriate)  Flowsheets (Taken 2020)  Problems Assessed (Neuraxial Anesthesia/Analgesia, OB): all  Problems Present (Neuraxial Anesth OB): none

## 2020-02-18 NOTE — PROGRESS NOTES
Patient complains of some jitteriness after the nebulizer treatment.    Cervix is 3 to 4 cm 70% and -2 station.  Artificial rupture membranes reveals thin meconium stained fluid  Fetal heart rate tracing is category 1.  Positive acceleration with scalp stimulation.    Plan-continue Pitocin and antibiotics for group B strep.

## 2020-02-18 NOTE — PROGRESS NOTES
The patient is comfortable with her epidural.    Cervix is 5 to 6 cm 70% -1 station  Fetal heart rate tracing is category 1  Contractions are about every 4 to 6 minutes but difficult to trace so IUPC catheter is placed    Assessment-good progress in labor, continue Pitocin  Continue antibiotics for group B strep positive status  Thin meconium stained fluid- plan for evaluation at delivery.

## 2020-02-19 PROBLEM — O13.9 GESTATIONAL HYPERTENSION, ANTEPARTUM: Status: ACTIVE | Noted: 2020-02-19

## 2020-02-19 LAB
BASOPHILS # BLD AUTO: 0.04 10*3/MM3 (ref 0–0.2)
BASOPHILS NFR BLD AUTO: 0.3 % (ref 0–1.5)
BUPRENORPHINE UR QL: NEGATIVE NG/ML
DEPRECATED RDW RBC AUTO: 40.9 FL (ref 37–54)
EOSINOPHIL # BLD AUTO: 0.19 10*3/MM3 (ref 0–0.4)
EOSINOPHIL NFR BLD AUTO: 1.3 % (ref 0.3–6.2)
ERYTHROCYTE [DISTWIDTH] IN BLOOD BY AUTOMATED COUNT: 13.1 % (ref 12.3–15.4)
HCT VFR BLD AUTO: 29.4 % (ref 34–46.6)
HGB BLD-MCNC: 9.8 G/DL (ref 12–15.9)
IMM GRANULOCYTES # BLD AUTO: 0.08 10*3/MM3 (ref 0–0.05)
IMM GRANULOCYTES NFR BLD AUTO: 0.5 % (ref 0–0.5)
LYMPHOCYTES # BLD AUTO: 1.76 10*3/MM3 (ref 0.7–3.1)
LYMPHOCYTES NFR BLD AUTO: 11.6 % (ref 19.6–45.3)
MCH RBC QN AUTO: 28.6 PG (ref 26.6–33)
MCHC RBC AUTO-ENTMCNC: 33.3 G/DL (ref 31.5–35.7)
MCV RBC AUTO: 85.7 FL (ref 79–97)
MONOCYTES # BLD AUTO: 0.97 10*3/MM3 (ref 0.1–0.9)
MONOCYTES NFR BLD AUTO: 6.4 % (ref 5–12)
NEUTROPHILS # BLD AUTO: 12.11 10*3/MM3 (ref 1.7–7)
NEUTROPHILS NFR BLD AUTO: 79.9 % (ref 42.7–76)
NRBC BLD AUTO-RTO: 0 /100 WBC (ref 0–0.2)
PLATELET # BLD AUTO: 126 10*3/MM3 (ref 140–450)
PMV BLD AUTO: 12.8 FL (ref 6–12)
RBC # BLD AUTO: 3.43 10*6/MM3 (ref 3.77–5.28)
WBC NRBC COR # BLD: 15.15 10*3/MM3 (ref 3.4–10.8)

## 2020-02-19 PROCEDURE — 94799 UNLISTED PULMONARY SVC/PX: CPT

## 2020-02-19 PROCEDURE — 85025 COMPLETE CBC W/AUTO DIFF WBC: CPT | Performed by: OBSTETRICS & GYNECOLOGY

## 2020-02-19 PROCEDURE — 94640 AIRWAY INHALATION TREATMENT: CPT

## 2020-02-19 RX ORDER — BISACODYL 10 MG
10 SUPPOSITORY, RECTAL RECTAL DAILY PRN
Status: DISCONTINUED | OUTPATIENT
Start: 2020-02-19 | End: 2020-02-20 | Stop reason: HOSPADM

## 2020-02-19 RX ORDER — ONDANSETRON 2 MG/ML
4 INJECTION INTRAMUSCULAR; INTRAVENOUS EVERY 6 HOURS PRN
Status: DISCONTINUED | OUTPATIENT
Start: 2020-02-19 | End: 2020-02-20 | Stop reason: HOSPADM

## 2020-02-19 RX ORDER — DOCUSATE SODIUM 100 MG/1
100 CAPSULE, LIQUID FILLED ORAL 2 TIMES DAILY
Status: DISCONTINUED | OUTPATIENT
Start: 2020-02-19 | End: 2020-02-20 | Stop reason: HOSPADM

## 2020-02-19 RX ORDER — ALBUTEROL SULFATE 90 UG/1
2 AEROSOL, METERED RESPIRATORY (INHALATION) EVERY 4 HOURS PRN
Status: DISCONTINUED | OUTPATIENT
Start: 2020-02-19 | End: 2020-02-19 | Stop reason: CLARIF

## 2020-02-19 RX ORDER — ALBUTEROL SULFATE 2.5 MG/3ML
2.5 SOLUTION RESPIRATORY (INHALATION) EVERY 4 HOURS PRN
Status: DISCONTINUED | OUTPATIENT
Start: 2020-02-19 | End: 2020-02-20 | Stop reason: HOSPADM

## 2020-02-19 RX ORDER — BUDESONIDE AND FORMOTEROL FUMARATE DIHYDRATE 160; 4.5 UG/1; UG/1
2 AEROSOL RESPIRATORY (INHALATION)
Status: DISCONTINUED | OUTPATIENT
Start: 2020-02-19 | End: 2020-02-20 | Stop reason: HOSPADM

## 2020-02-19 RX ORDER — DIPHENHYDRAMINE HCL 25 MG
25 CAPSULE ORAL NIGHTLY PRN
Status: DISCONTINUED | OUTPATIENT
Start: 2020-02-19 | End: 2020-02-20 | Stop reason: HOSPADM

## 2020-02-19 RX ADMIN — IBUPROFEN 600 MG: 600 TABLET, FILM COATED ORAL at 08:42

## 2020-02-19 RX ADMIN — BUDESONIDE AND FORMOTEROL FUMARATE DIHYDRATE 2 PUFF: 160; 4.5 AEROSOL RESPIRATORY (INHALATION) at 21:05

## 2020-02-19 RX ADMIN — IBUPROFEN 600 MG: 600 TABLET, FILM COATED ORAL at 17:32

## 2020-02-19 RX ADMIN — LABETALOL HCL 100 MG: 100 TABLET, FILM COATED ORAL at 20:28

## 2020-02-19 RX ADMIN — LABETALOL HCL 100 MG: 100 TABLET, FILM COATED ORAL at 08:45

## 2020-02-19 RX ADMIN — DOCUSATE SODIUM 100 MG: 100 CAPSULE, LIQUID FILLED ORAL at 20:28

## 2020-02-19 RX ADMIN — OXYCODONE HYDROCHLORIDE AND ACETAMINOPHEN 1 TABLET: 5; 325 TABLET ORAL at 08:43

## 2020-02-19 RX ADMIN — OXYCODONE HYDROCHLORIDE AND ACETAMINOPHEN 1 TABLET: 5; 325 TABLET ORAL at 17:32

## 2020-02-19 RX ADMIN — OXYCODONE HYDROCHLORIDE AND ACETAMINOPHEN 1 TABLET: 5; 325 TABLET ORAL at 13:39

## 2020-02-19 RX ADMIN — DOCUSATE SODIUM 100 MG: 100 CAPSULE, LIQUID FILLED ORAL at 08:42

## 2020-02-19 RX ADMIN — ALBUTEROL SULFATE 2.5 MG: 2.5 SOLUTION RESPIRATORY (INHALATION) at 09:04

## 2020-02-19 NOTE — PLAN OF CARE
Problem: Patient Care Overview  Goal: Plan of Care Review  Outcome: Ongoing (interventions implemented as appropriate)  Flowsheets  Taken 2/18/2020 1742 by Delores Louis RN  Progress: improving  Plan of Care Reviewed With: patient;family  Taken 2/19/2020 0013 by Tami Kruse RN  Outcome Summary: vaginal delivery, prolonged recovery due to pt blood pressures, transferred to mother baby     Problem: Patient Care Overview  Goal: Individualization and Mutuality  Outcome: Ongoing (interventions implemented as appropriate)  Flowsheets (Taken 2/18/2020 1742 by Delores Louis RN)  Patient Specific Goals (Include Timeframe): Pain management with epidural for labor  How to Address Anxieties/Fears: None  Patient Specific Interventions: SRIKANTH, breastfeeding, asthma management  What Anxieties, Fears, Concerns, or Questions Do You Have About Your Care?: None  Patient Specific Preferences: Healthy vaginal delivery of baby girl.     Problem: Patient Care Overview  Goal: Discharge Needs Assessment  Outcome: Ongoing (interventions implemented as appropriate)  Flowsheets  Taken 2/18/2020 1742 by Delores Louis RN  Equipment Needed After Discharge: none  Anticipated Changes Related to Illness: none  Transportation Concerns: car, none  Concerns to be Addressed: no discharge needs identified  Readmission Within the Last 30 Days: no previous admission in last 30 days  Taken 2/18/2020 0853 by Delores Louis RN  Equipment Currently Used at Home: none  Taken 2/18/2020 0851 by Delores Louis RN  Transportation Anticipated: car, drives self;family or friend will provide  Patient/Family Anticipated Services at Transition: none  Patient/Family Anticipates Transition to: home with family     Problem: Patient Care Overview  Goal: Interprofessional Rounds/Family Conf  Outcome: Ongoing (interventions implemented as appropriate)  Flowsheets (Taken 2/19/2020 0013)  Participants: family; nursing; physician; patient      Problem: Fall Risk,  (Adult,Obstetrics,Pediatric)  Goal: Identify Related Risk Factors and Signs and Symptoms  Outcome: Ongoing (interventions implemented as appropriate)  Flowsheets (Taken 2020 by Delores Louis RN)  Signs and Symptoms (Fall Risk, ): presence of fall risk factors     Problem: Fall Risk,  (Adult,Obstetrics,Pediatric)  Goal: Absence of Maternal Fall  Outcome: Ongoing (interventions implemented as appropriate)  Flowsheets (Taken 2020 by Delores Louis RN)  Absence of Maternal Fall: achieves outcome     Problem: Fall Risk,  (Adult,Obstetrics,Pediatric)  Goal: Absence of  Fall/Drop  Outcome: Ongoing (interventions implemented as appropriate)  Flowsheets (Taken 2020 by Delores Louis RN)  Absence of [place] Fall/Drop: achieves outcome     Problem: Skin Injury Risk (Adult)  Goal: Identify Related Risk Factors and Signs and Symptoms  Outcome: Ongoing (interventions implemented as appropriate)  Flowsheets (Taken 2020 by Delores Louis RN)  Related Risk Factors (Skin Injury Risk): mobility impaired;moisture;medication;medical devices     Problem: Skin Injury Risk (Adult)  Goal: Skin Health and Integrity  Outcome: Ongoing (interventions implemented as appropriate)  Flowsheets (Taken 2020 by Delores Louis RN)  Skin Health and Integrity: achieves outcome     Problem: Anesthesia/Analgesia, Neuraxial (Obstetrics)  Goal: Signs and Symptoms of Listed Potential Problems Will be Absent, Minimized or Managed (Anesthesia/Analgesia, Neuraxial)  Outcome: Ongoing (interventions implemented as appropriate)  Flowsheets (Taken 2020 by Delores Louis RN)  Problems Assessed (Neuraxial Anesthesia/Analgesia, OB): all  Problems Present (Neuraxial Anesth OB): none     Problem: Labor (Cervical Ripen, Induct, Augment) (Adult,Obstetrics,Pediatric)  Goal: Signs and Symptoms of Listed Potential Problems Will  be Absent, Minimized or Managed (Labor)  Outcome: Outcome(s) achieved

## 2020-02-19 NOTE — PROGRESS NOTES
Continued Stay Note  [facility]     Patient Name: Desirae Pascual  MRN: [id number]  Today's Date: 2020    Admit Date: 2020    Discharge Plan     Row Name 20 1129       Plan    Plan  Home with infant     Patient/Family in Agreement with Plan  yes    Plan Comments  Mother's MRN:  [id number] and Desirae Stone's girl (Karly Matt) MRN: [id number]. Consult: Potential  Drug Exposure. CCP spoke with [facility] hotline/[place]; no active case. Infant's urine negative. Cord not collected. Mother's urine negative on admission and mother's urine negative during prenatal care. CCP met with mother at bedside. Maternal grandmother (Melisa Leon) was at bedside. Mother consented to CCP talking with maternal grandmother present. CCP verified face sheet. Mother states her address [address] [place] [address]  [place] [address]. (updated in Epic). Mother states this is her first child. Mother lives with father of the baby. Mother received prenatal care through Dr. Yap and infant's pediatrician will be Dr. Payal Au. Mother plans to breast feed and does have breast pump. Mother states she has all necessaties including; car seat, crib and clothes. Mother is not interested in WIC and did not want a referral to the HANDS program but consented to CCP providing contact information for HANDS. CCP provided mother with HANDS pamphlet. Mother states she was previously on Celexa but has not been taking it since she was pregnant. Mother does not see a psychiatrist or counselor. Mother states she will follow up with her PCP regarding Celexa if it is needed. Mother admits to drug history in 2017 but states she went through Inpatient and outpatient treatment at [facility]. Mother denies any drug use since 2017. Mother denies any needs or resources at this time. CCP will follow for any needs to arise. [place]         Discharge Codes    No documentation.             CHELY Thompson

## 2020-02-19 NOTE — PROGRESS NOTES
"[facility]  Vaginal Delivery Progress Note    Subjective   Postpartum Day 1: Vaginal Delivery    The patient feels well.  Her pain is well controlled.   She is ambulating well.  Patient describes her bleeding as staining only. She denies headache, vision changes or abdominal pain. She denies significant swelling    Breastfeeding: infant latching.    ROS:  Neuro: negative for headache or vision changes  GI: negative for abdominal pain, n/v  : negative for heavy bleeding  Musculoskeletal: negative for leg pain    Objective     Vital Signs Range for the last 24 hours  Temperature: Temp:  [97.6 °F (36.4 °C)-98.5 °F (36.9 °C)] 97.9 °F (36.6 °C)   Temp Source: Temp src: Oral   BP: BP: (121-167)/(62-95) 146/88   Pulse: Heart Rate:  [] 87   Respirations: Resp:  [16-20] 16   SPO2: SpO2:  [98 %] 98 %   O2 Amount (l/min):     O2 Devices     Weight:       Admit Height:  Height: 165.1 cm (65\")      Physical Exam:  General:  no acute distress.  Lungs: clear bilaterally  Abdomen: Fundus: appropriate, firm, non tender  Extremities: bilateral lower extremities with trace edema, no cords or tenderness; 2+ DTR's      Lab results reviewed:   Lab Results   Component Value Date    WBC 15.15 (H) 02/19/2020    HGB 9.8 (L) 02/19/2020    HCT 29.4 (L) 02/19/2020    MCV 85.7 02/19/2020     (L) 02/19/2020     Rubella:     Rubella Antibodies, IgG   Date Value Ref Range Status   07/09/2019 3.33 Immune >0.99 index Final     Comment:                                     Non-immune       <0.90                                  Equivocal  0.90 - 0.99                                  Immune           >0.99       Rh Status:    RH type   Date Value Ref Range Status   02/18/2020 Positive  Final     Rh Factor   Date Value Ref Range Status   07/09/2019 Positive  Final     Comment:     Please note: Prior records for this patient's ABO / Rh type are not  available for additional verification.       Immunizations:   Immunization History "   Administered Date(s) Administered   • Hepatitis A 05/09/2018   • PPD Test 11/16/2016, 11/06/2017   • Tdap 12/05/2019   • flucelvax quad pfs =>4 YRS 10/10/2019       Assessment/Plan       Pregnancy    Moderate persistent asthma without complication    Anxiety    Iron (Fe) deficiency anemia    GBS (group B Streptococcus carrier), +RV culture, currently pregnant    Vaginal delivery    Gestational hypertension, antepartum      Desiraeneela Pascual is Day 1  post-partum  Vaginal, Spontaneous  : pt is stable today. BP's labile but improved on labetalol 100 mg twice daily.    Gestational HTN: suspect diagnosis given labile BP values since admission. Patient denies preeclamptic symptoms. Will repeat CBC and check CMP in am as mildly decreased platelets noted    Plan:  Continue current care.      Sandra Stern MD  2/19/2020  1:57 PM

## 2020-02-19 NOTE — LACTATION NOTE
This note was copied from a baby's chart.  P1T, Mother reports infant breastfeeding well, latching with no issues-latch comfortable and no pain. She states she has personal pump at home and denies any questions/concerns/issues. Lactation RN advised to feed every 2-3 hours and PRN (8-12 times in 24 hours), monitor for output/wts, and normal cluster feeding. Advised to call with any needs.

## 2020-02-19 NOTE — L&D DELIVERY NOTE
[facility]  Vaginal Delivery Note    Delivery     Delivery:    Yonatan Pascual [[id number]]   Vaginal, Spontaneous     Sadia PascualConor [[id number]]          Date of Birth:     Yonatan Pascual [[id number]]   2022     Deepti Pascualmary [[id number]]      Time of Birth:  Gestational Age    Yonatan Pascual [[id number]]   7:30 PM     Samara SimonkatieConor [[id number]]     39w5d     Anesthesia:    Samara Yonatan [[id number]]   Epidural     Samara SimonkatieConor [[id number]]          Delivering clinician:    Yonatan Pascual [[id number]]   Nguyễn RomeroriesRose [[id number]]         Forceps?   No   Vacuum? No    Shoulder dystocia present: No        Delivery narrative: The patient is a 19-year-old  1 para 0 at 39-5/7 weeks who was admitted for term induction of labor.  Initial fetal heart rate tracing did show some variable decelerations but then tracing became reassuring and category 1.  Patient was started on Pitocin.  She was started on antibiotics for group B strep positive status.  Artificial rupture membranes revealed thin meconium stained fluid.  Patient received an epidural and made steady labor progress through the day.  When she was complete and was started.  Patient pushed with excellent effort.  When the baby was at +2 station she was prepped and draped for delivery.  The fetal head delivered.  Nares and mouth were bulb suctioned.  There was no nuchal cord.  Shoulders and body delivered with good maternal effort.  Baby was placed on mom's abdomen and after 30 seconds the cord was clamped and cut.  Baby did have a good vigorous cry.  Baby was taken to the warmer for evaluation due to the meconium-stained fluid.  Baby was evaluated by the  nurse practitioner.  Placenta delivered and was noted to be complete.  It was sent to pathology.  Uterus was explored and no remaining tissue was felt.  Uterus initially was slightly boggy but with massage firmed up nicely.   There was a vaginal and perineal first-degree laceration which was repaired with 3-0 Vicryl in standard fashion.  There were so bilateral labial lacerations which were repaired with 4-0 Vicryl on an SH needle.  Red rubber catheter was used to drain the bladder.  Fundus was reexamined and noted to be firm.  Mom is recovering well.      Infant    Findings:    Samara, Pending [[id number]]   child     Rose Pascual [[id number]]   female   infant     Infant observations: Weight:      Samara, Pending [[id number]]   No birth weight on file.     Rose Pascual [[id number]]   No birth weight on file.    Length:      Samara, Pending [[id number]]         Rose Pascual [[id number]]       in  Observations/Comments:       Samara, Pending [[id number]]         Rose Pascual [[id number]]           Apgars:    Samara, Pending [[id number]]         Rose Pascual [[id number]]       @ 1 minute /       Samara Pending [[id number]]         Deepti Pascualroneida [[id number]]       @ 5 minutes   Infant Name:  Karly Wheat     Placenta, Cord, and Fluid    Placenta delivered     Samara, Pending [[id number]]   Spontaneous     Rose Pascual [[id number]]       at        Samara, Pending [[id number]]   2/18/2020  7:37 PM     Rose Pascual [[id number]]          Cord:    David Pascualing [[id number]]   3 vessels     Rose Pascual [[id number]]       present.   Nuchal Cord?  no   Cord blood obtained:    Samara, Pending [[id number]]   Yes     Rose Pascual [[id number]]         Cord gases obtained:     Samara Pending [[id number]]   No     Rose Pascual [[id number]]         Cord gas results: Venous:  No results found for: PHCVEN    Arterial:  No results found for: PHCART     Repair    Episiotomy:    Samara, Pending [[id number]]   None     Rose Pascual [[id number]]   None      No    Lacerations: Yes  Laceration Information  Laceration Repaired?   Perineal:       Samara, AlliesGirl B [[id number]]   1st     Samara, AlliesGirl [[id number]]   1st      Samara, AlliesGirl B [[id number]]   Yes     Samara, AlliesGirl [[id number]]   Yes     Periurethral:      Samara, AlliesGirl B [[id number]]         Samara, AlliesGirl [[id number]]          Samara, AlliesGirl B [[id number]]         Samara, AlliesGirl [[id number]]         Labial:      Samara, AlliesGirl B [[id number]]   bilateral     Samara, AlliesGirl [[id number]]   bilateral      Samara, AlliesGirl B [[id number]]   Yes     Samara, AlliesGirl [[id number]]   Yes     Sulcus:      Samara, AlliesGirl B [[id number]]         Samara, AlliesGirl [[id number]]          Samara, AlliesGirl B [[id number]]         Samara, AlliesGirl [[id number]]         Vaginal:      Samara, AlliesGirl B [[id number]]         Samara, AlliesGirl [[id number]]          Samara, AlliesGirl B [[id number]]         Samara, AlliesGirl [[id number]]         Cervical:      Samara, AlliesGirl B [[id number]]         Samara, AlliesGirl [[id number]]          Samara, AlliesGirl B [[id number]]         Samara, AlliesGirl [[id number]]           Suture used for repair: 3-0 Vicryl   Estimated Blood Loss: Est. Blood Loss (mL): 350 mL(Filed from Delivery Summary) (02/18/20 1930)           Complications  none    Disposition  Mother to Mother Baby/Postpartum  in stable condition currently.  Baby to NICU  in stable condition currently.      Nguyễn Yap MD  02/18/20  8:01 PM

## 2020-02-19 NOTE — PLAN OF CARE
Problem: Patient Care Overview  Goal: Plan of Care Review  Outcome: Ongoing (interventions implemented as appropriate)  Flowsheets  Taken 2020 0603  Progress: improving  Outcome Summary: Vag delivery ; Hx of substance abuse, self-harm and depression; No current issues at the moment; No thoughts of harm; Pt is appropriate; VSS; Lochia and fundus WNL; IV D/C; Voiding and up ab chapito without any issues; Bottlefeeding infant; Pain controlled with PO pain meds; Spouse at bedside; POC discussed with pt and spouse; Concerns and questions addressed at this time; Will contunie to monitor and assess  Taken 2020 0000  Plan of Care Reviewed With: patient;spouse  Goal: Individualization and Mutuality  Outcome: Ongoing (interventions implemented as appropriate)  Goal: Discharge Needs Assessment  Outcome: Ongoing (interventions implemented as appropriate)  Flowsheets  Taken 2020 0851 by Delores Louis RN  Transportation Anticipated: car, drives self;family or friend will provide  Patient/Family Anticipated Services at Transition: none  Patient/Family Anticipates Transition to: home with family  Taken 2020 1742 by Delores Louis RN  Transportation Concerns: car, none  Concerns to be Addressed: no discharge needs identified  Readmission Within the Last 30 Days: no previous admission in last 30 days  Goal: Interprofessional Rounds/Family Conf  Outcome: Ongoing (interventions implemented as appropriate)  Flowsheets (Taken 2020 0603)  Participants: family; nursing; patient     Problem: Fall Risk,  (Adult,Obstetrics,Pediatric)  Goal: Identify Related Risk Factors and Signs and Symptoms  Outcome: Ongoing (interventions implemented as appropriate)  Flowsheets (Taken 2020 0603)  Related Risk Factors (Fall Risk, ): fatigue; sleep disturbance  Signs and Symptoms (Fall Risk, ): presence of fall risk factors; increased risk of  fall/drop  Goal: Absence of Maternal  Fall  Outcome: Ongoing (interventions implemented as appropriate)  Flowsheets (Taken 2020 0603)  Absence of Maternal Fall: making progress toward outcome  Goal: Absence of  Fall/Drop  Outcome: Ongoing (interventions implemented as appropriate)  Flowsheets (Taken 2020 0603)  Absence of [place] Fall/Drop: making progress toward outcome     Problem: Skin Injury Risk (Adult)  Goal: Identify Related Risk Factors and Signs and Symptoms  Outcome: Ongoing (interventions implemented as appropriate)  Flowsheets (Taken 2020 06)  Related Risk Factors (Skin Injury Risk): moisture; medication  Goal: Skin Health and Integrity  Outcome: Ongoing (interventions implemented as appropriate)  Flowsheets (Taken 2020 0603)  Skin Health and Integrity: making progress toward outcome     Problem: Anesthesia/Analgesia, Neuraxial (Obstetrics)  Goal: Signs and Symptoms of Listed Potential Problems Will be Absent, Minimized or Managed (Anesthesia/Analgesia, Neuraxial)  Outcome: Ongoing (interventions implemented as appropriate)  Flowsheets (Taken 2020 1742 by Delores Louis RN)  Problems Assessed (Neuraxial Anesthesia/Analgesia, OB): all  Problems Present (Neuraxial Anesth OB): none     Problem: Postpartum (Vaginal Delivery) (Adult,Obstetrics,Pediatric)  Goal: Signs and Symptoms of Listed Potential Problems Will be Absent, Minimized or Managed (Postpartum)  Outcome: Ongoing (interventions implemented as appropriate)  Flowsheets (Taken 2020 0603)  Problems Assessed (Postpartum Vaginal Delivery): all  Problems Present (Postpartum Vag Deliv): none

## 2020-02-20 VITALS
RESPIRATION RATE: 16 BRPM | HEART RATE: 89 BPM | OXYGEN SATURATION: 97 % | HEIGHT: 65 IN | BODY MASS INDEX: 31.06 KG/M2 | SYSTOLIC BLOOD PRESSURE: 140 MMHG | DIASTOLIC BLOOD PRESSURE: 83 MMHG | WEIGHT: 186.4 LBS | TEMPERATURE: 96.8 F

## 2020-02-20 LAB
ALBUMIN SERPL-MCNC: 3 G/DL (ref 3.5–5.2)
ALBUMIN/GLOB SERPL: 1.5 G/DL
ALP SERPL-CCNC: 83 U/L (ref 39–117)
ALT SERPL W P-5'-P-CCNC: 16 U/L (ref 1–33)
ANION GAP SERPL CALCULATED.3IONS-SCNC: 8.4 MMOL/L (ref 5–15)
AST SERPL-CCNC: 34 U/L (ref 1–32)
BASOPHILS # BLD AUTO: 0.04 10*3/MM3 (ref 0–0.2)
BASOPHILS NFR BLD AUTO: 0.4 % (ref 0–1.5)
BILIRUB SERPL-MCNC: <0.2 MG/DL (ref 0.2–1.2)
BUN BLD-MCNC: 7 MG/DL (ref 6–20)
BUN/CREAT SERPL: 12.3 (ref 7–25)
CALCIUM SPEC-SCNC: 8.6 MG/DL (ref 8.6–10.5)
CHLORIDE SERPL-SCNC: 102 MMOL/L (ref 98–107)
CO2 SERPL-SCNC: 24.6 MMOL/L (ref 22–29)
CREAT BLD-MCNC: 0.57 MG/DL (ref 0.57–1)
DEPRECATED RDW RBC AUTO: 39.8 FL (ref 37–54)
EOSINOPHIL # BLD AUTO: 0.68 10*3/MM3 (ref 0–0.4)
EOSINOPHIL NFR BLD AUTO: 6.9 % (ref 0.3–6.2)
ERYTHROCYTE [DISTWIDTH] IN BLOOD BY AUTOMATED COUNT: 13 % (ref 12.3–15.4)
GFR SERPL CREATININE-BSD FRML MDRD: 134 ML/MIN/1.73
GLOBULIN UR ELPH-MCNC: 2 GM/DL
GLUCOSE BLD-MCNC: 75 MG/DL (ref 65–99)
HCT VFR BLD AUTO: 27 % (ref 34–46.6)
HGB BLD-MCNC: 9.1 G/DL (ref 12–15.9)
IMM GRANULOCYTES # BLD AUTO: 0.04 10*3/MM3 (ref 0–0.05)
IMM GRANULOCYTES NFR BLD AUTO: 0.4 % (ref 0–0.5)
LYMPHOCYTES # BLD AUTO: 2.48 10*3/MM3 (ref 0.7–3.1)
LYMPHOCYTES NFR BLD AUTO: 25.2 % (ref 19.6–45.3)
MCH RBC QN AUTO: 28.5 PG (ref 26.6–33)
MCHC RBC AUTO-ENTMCNC: 33.7 G/DL (ref 31.5–35.7)
MCV RBC AUTO: 84.6 FL (ref 79–97)
MONOCYTES # BLD AUTO: 0.62 10*3/MM3 (ref 0.1–0.9)
MONOCYTES NFR BLD AUTO: 6.3 % (ref 5–12)
NEUTROPHILS # BLD AUTO: 5.99 10*3/MM3 (ref 1.7–7)
NEUTROPHILS NFR BLD AUTO: 60.8 % (ref 42.7–76)
NRBC BLD AUTO-RTO: 0 /100 WBC (ref 0–0.2)
PLATELET # BLD AUTO: 126 10*3/MM3 (ref 140–450)
PMV BLD AUTO: 12.4 FL (ref 6–12)
POTASSIUM BLD-SCNC: 3.7 MMOL/L (ref 3.5–5.2)
PROT SERPL-MCNC: 5 G/DL (ref 6–8.5)
RBC # BLD AUTO: 3.19 10*6/MM3 (ref 3.77–5.28)
SODIUM BLD-SCNC: 135 MMOL/L (ref 136–145)
WBC NRBC COR # BLD: 9.85 10*3/MM3 (ref 3.4–10.8)

## 2020-02-20 PROCEDURE — 99024 POSTOP FOLLOW-UP VISIT: CPT | Performed by: OBSTETRICS & GYNECOLOGY

## 2020-02-20 PROCEDURE — 80053 COMPREHEN METABOLIC PANEL: CPT | Performed by: OBSTETRICS & GYNECOLOGY

## 2020-02-20 PROCEDURE — 85025 COMPLETE CBC W/AUTO DIFF WBC: CPT | Performed by: OBSTETRICS & GYNECOLOGY

## 2020-02-20 PROCEDURE — 94799 UNLISTED PULMONARY SVC/PX: CPT

## 2020-02-20 RX ORDER — LANOLIN
CREAM (ML) TOPICAL AS NEEDED
Status: DISCONTINUED | OUTPATIENT
Start: 2020-02-20 | End: 2020-02-20 | Stop reason: HOSPADM

## 2020-02-20 RX ORDER — IBUPROFEN 600 MG/1
600 TABLET ORAL EVERY 6 HOURS PRN
Qty: 24 TABLET | Refills: 0 | Status: SHIPPED | OUTPATIENT
Start: 2020-02-20 | End: 2020-09-23

## 2020-02-20 RX ORDER — OXYCODONE HYDROCHLORIDE AND ACETAMINOPHEN 5; 325 MG/1; MG/1
1 TABLET ORAL EVERY 4 HOURS PRN
Qty: 10 TABLET | Refills: 0 | Status: SHIPPED | OUTPATIENT
Start: 2020-02-20 | End: 2020-02-28

## 2020-02-20 RX ORDER — LABETALOL 100 MG/1
100 TABLET, FILM COATED ORAL EVERY 12 HOURS SCHEDULED
Qty: 60 TABLET | Refills: 1 | Status: SHIPPED | OUTPATIENT
Start: 2020-02-20 | End: 2020-09-23

## 2020-02-20 RX ADMIN — BUDESONIDE AND FORMOTEROL FUMARATE DIHYDRATE 2 PUFF: 160; 4.5 AEROSOL RESPIRATORY (INHALATION) at 08:59

## 2020-02-20 RX ADMIN — IBUPROFEN 600 MG: 600 TABLET, FILM COATED ORAL at 09:16

## 2020-02-20 RX ADMIN — LABETALOL HCL 100 MG: 100 TABLET, FILM COATED ORAL at 09:16

## 2020-02-20 RX ADMIN — Medication: at 09:55

## 2020-02-20 RX ADMIN — OXYCODONE HYDROCHLORIDE AND ACETAMINOPHEN 1 TABLET: 5; 325 TABLET ORAL at 00:03

## 2020-02-20 RX ADMIN — DOCUSATE SODIUM 100 MG: 100 CAPSULE, LIQUID FILLED ORAL at 09:16

## 2020-02-20 RX ADMIN — OXYCODONE HYDROCHLORIDE AND ACETAMINOPHEN 1 TABLET: 5; 325 TABLET ORAL at 09:16

## 2020-02-20 NOTE — DISCHARGE SUMMARY
PPD 2 Discharge Summary/Progress      This  female, was admitted on 2020 and underwent a Vaginal, Spontaneous  on 2020 , resulting in the birth of the following:  Infant    Findings:    Infant observations:                            APGARS  One minute Five minutes Ten minutes Fifteen minutes Twenty minutes   Skin color: 0   0             Heart rate: 2   2             Grimace: 2   2              Muscle tone: 2   2              Breathin   2              Totals: 8   8                  LABS:   Lab Results (last 72 hours)     Procedure Component Value Units Date/Time    Comprehensive Metabolic Panel [[id number]]  (Abnormal) Collected:  20    Specimen:  Blood from Arm, Left Updated:  20     Glucose 75 mg/dL      BUN 7 mg/dL      Creatinine 0.57 mg/dL      Sodium 135 mmol/L      Potassium 3.7 mmol/L      Chloride 102 mmol/L      CO2 24.6 mmol/L      Calcium 8.6 mg/dL      Total Protein 5.0 g/dL      Albumin 3.00 g/dL      ALT (SGPT) 16 U/L      AST (SGOT) 34 U/L      Alkaline Phosphatase 83 U/L      Total Bilirubin <0.2 mg/dL      eGFR Non African Amer 134 mL/min/1.73      Globulin 2.0 gm/dL      A/G Ratio 1.5 g/dL      BUN/Creatinine Ratio 12.3     Anion Gap 8.4 mmol/L     Narrative:       GFR Normal >60  Chronic Kidney Disease <60  Kidney Failure <15      CBC & Differential [[id number]] Collected:  20    Specimen:  Blood Updated:  20    Narrative:       The following orders were created for panel order CBC & Differential.  Procedure                               Abnormality         Status                     ---------                               -----------         ------                     CBC Auto Differential[[id number]]        Abnormal            Final result                 Please view results for these tests on the individual orders.    CBC Auto Differential [[id number]]  (Abnormal) Collected:  20    Specimen:  Blood Updated:   02/20/20 0640     WBC 9.85 10*3/mm3      RBC 3.19 10*6/mm3      Hemoglobin 9.1 g/dL      Hematocrit 27.0 %      MCV 84.6 fL      MCH 28.5 pg      MCHC 33.7 g/dL      RDW 13.0 %      RDW-SD 39.8 fl      MPV 12.4 fL      Platelets 126 10*3/mm3      Neutrophil % 60.8 %      Lymphocyte % 25.2 %      Monocyte % 6.3 %      Eosinophil % 6.9 %      Basophil % 0.4 %      Immature Grans % 0.4 %      Neutrophils, Absolute 5.99 10*3/mm3      Lymphocytes, Absolute 2.48 10*3/mm3      Monocytes, Absolute 0.62 10*3/mm3      Eosinophils, Absolute 0.68 10*3/mm3      Basophils, Absolute 0.04 10*3/mm3      Immature Grans, Absolute 0.04 10*3/mm3      nRBC 0.0 /100 WBC     URINE DRUG SCREEN PLUS BUPRENORPHINE - [[id number]] Collected:  02/18/20 0921     Updated:  02/19/20 1109    Narrative:       The following orders were created for panel order URINE DRUG SCREEN PLUS BUPRENORPHINE -.  Procedure                               Abnormality         Status                     ---------                               -----------         ------                     Urine Drug Screen - Urin...[[id number]]  Normal              Final result               Buprenorphine Screen Uri...[[id number]]                      Final result                 Please view results for these tests on the individual orders.    Buprenorphine Screen Urine - Urine, Clean Catch [[id number]] Collected:  02/18/20 0921    Specimen:  Urine, Clean Catch Updated:  02/19/20 1109     Buprenorphine, Urine Negative ng/mL     Narrative:       Performed at:  01 - [facility] [address]  [address] [facility], [place]  [id number]  : Sagrario Riley PhD, Phone:  [phone]    CBC & Differential [[id number]] Collected:  02/19/20 0539    Specimen:  Blood Updated:  02/19/20 0657    Narrative:       The following orders were created for panel order CBC & Differential.  Procedure                               Abnormality         Status                     ---------                                -----------         ------                     CBC Auto Differential[[id number]]        Abnormal            Final result                 Please view results for these tests on the individual orders.    CBC Auto Differential [[id number]]  (Abnormal) Collected:  02/19/20 0539    Specimen:  Blood Updated:  02/19/20 0657     WBC 15.15 10*3/mm3      RBC 3.43 10*6/mm3      Hemoglobin 9.8 g/dL      Hematocrit 29.4 %      MCV 85.7 fL      MCH 28.6 pg      MCHC 33.3 g/dL      RDW 13.1 %      RDW-SD 40.9 fl      MPV 12.8 fL      Platelets 126 10*3/mm3      Neutrophil % 79.9 %      Lymphocyte % 11.6 %      Monocyte % 6.4 %      Eosinophil % 1.3 %      Basophil % 0.3 %      Immature Grans % 0.5 %      Neutrophils, Absolute 12.11 10*3/mm3      Lymphocytes, Absolute 1.76 10*3/mm3      Monocytes, Absolute 0.97 10*3/mm3      Eosinophils, Absolute 0.19 10*3/mm3      Basophils, Absolute 0.04 10*3/mm3      Immature Grans, Absolute 0.08 10*3/mm3      nRBC 0.0 /100 WBC     Urine Drug Screen - Urine, Clean Catch [[id number]]  (Normal) Collected:  02/18/20 0921    Specimen:  Urine, Clean Catch Updated:  02/18/20 0959     Amphet/Methamphet, Screen Negative     Barbiturates Screen, Urine Negative     Benzodiazepine Screen, Urine Negative     Cocaine Screen, Urine Negative     Opiate Screen Negative     THC, Screen, Urine Negative     Methadone Screen, Urine Negative     Oxycodone Screen, Urine Negative    Narrative:       Negative Thresholds For Drugs Screened:     Amphetamines               500 ng/ml   Barbiturates               200 ng/ml   Benzodiazepines            100 ng/ml   Cocaine                    300 ng/ml   Methadone                  300 ng/ml   Opiates                    300 ng/ml   Oxycodone                  100 ng/ml   THC                        50 ng/ml    The Normal Value for all drugs tested is negative. This report includes final unconfirmed screening results to be used for medical treatment purposes only. Unconfirmed results  must not be used for non-medical purposes such as employment or legal testing. Clinical consideration should be applied to any drug of abuse test, particulary when unconfirmed results are used.    CBC & Differential [[id number]] Collected:  02/18/20 0739    Specimen:  Blood Updated:  02/18/20 0806    Narrative:       The following orders were created for panel order CBC & Differential.  Procedure                               Abnormality         Status                     ---------                               -----------         ------                     CBC Auto Differential[[id number]]        Abnormal            Final result                 Please view results for these tests on the individual orders.    CBC Auto Differential [[id number]]  (Abnormal) Collected:  02/18/20 0739    Specimen:  Blood Updated:  02/18/20 0806     WBC 9.07 10*3/mm3      RBC 3.96 10*6/mm3      Hemoglobin 11.4 g/dL      Hematocrit 33.9 %      MCV 85.6 fL      MCH 28.8 pg      MCHC 33.6 g/dL      RDW 13.2 %      RDW-SD 41.7 fl      MPV 12.2 fL      Platelets 131 10*3/mm3      Neutrophil % 67.1 %      Lymphocyte % 20.1 %      Monocyte % 5.7 %      Eosinophil % 6.0 %      Basophil % 0.4 %      Immature Grans % 0.7 %      Neutrophils, Absolute 6.09 10*3/mm3      Lymphocytes, Absolute 1.82 10*3/mm3      Monocytes, Absolute 0.52 10*3/mm3      Eosinophils, Absolute 0.54 10*3/mm3      Basophils, Absolute 0.04 10*3/mm3      Immature Grans, Absolute 0.06 10*3/mm3      nRBC 0.0 /100 WBC             ROS:  Pulm: neg for soa  GI: neg for heavy bleeding  Musculoskel: neg for leg pain          ASSESSMENT/DISCHARGE SUMMARY    Post delivery the patient did well. She was tolerating a regular diet, ambulating, voiding and passing flatus. Post delivery hemoglobin was   Lab Results   Component Value Date    HGB 9.1 (L) 02/20/2020   .      Discharge diagnosis:   Hospital Problem List     * (Principal) Pregnancy    Moderate persistent asthma without complication     Overview Signed 2019  3:34 PM by Rama Leach MD     Under the care of allergist: [facility]          Asthma is improving with treatment.  The patient is experiencing no daytime asthma symptoms. She is experiencing no nighttime asthma symptoms.  Personalized, written asthma management plan given.  Asthma information handout given.  Patient to keep asthma diary.          Anxiety    Iron (Fe) deficiency anemia    GBS (group B Streptococcus carrier), +RV culture, currently pregnant    Vaginal delivery    Gestational hypertension, antepartum    Hypertension is improving with treatment.  Continue current treatment regimen.  Blood pressure will be reassessed in 4 weeks.                                             Pregnancy [Z34.90]                                     Vaginal Delivery at 39w5d, uncomplicated recovery    On day of discharge, uterus was firm, extremities were non tender with no erythema or masses. Lochia was normal.    Pt was given prescriptions for Percocet 5/325 and Motrin 800 mg PRN for pain.  Labetalol 100 mg twice daily.  Patient has blood pressure device at home and will check her blood pressure 2-3 times daily and notify us if it is elevated.  Currently this morning she looks great without visual changes headache abdominal pain and edema is minimal.  ALT AST reviewed with minimal elevation.  Platelets steady at 126.  Patient is diuresing well    Instructed to call the office to make an appointment in  6 weeks after your delivery.    Please call the office, or the OB/GYN on-call if after-hours, for any questions, concerns or any of the followin) Fever - a temperature greater than 100.4  2) Uncontrolled pain  3) Uncontrolled bleeding (soaking more than 1 pad in an hour)  4) Foul-smelling discharge from the vagina    Do not place anything in the vagina - this includes tampons, douches or having sex - until after your 6 week postpartum visit .    Carlin Ramos,  MD    2/20/2020  9:46 AM

## 2020-02-20 NOTE — LACTATION NOTE
This note was copied from a baby's chart.  P1T, pt reports that infant continues to latch and feed well, no pain, no issues or concerns. Mother reports she has been feeding cross cradle and would like help getting infant into football position to try it out, encouraged pt to call lactation or nurse for help with this position when ready. Gave info regarding [facility], mommy and me. Lactation rn advised to continue to feed every 2-3 hours and PRN (8-12 times in 24 hours), monitor wts/output.

## 2020-02-27 ENCOUNTER — TELEPHONE (OUTPATIENT)
Dept: LACTATION | Facility: HOSPITAL | Age: 20
End: 2020-02-27

## 2020-02-28 ENCOUNTER — POSTPARTUM VISIT (OUTPATIENT)
Dept: OBSTETRICS AND GYNECOLOGY | Age: 20
End: 2020-02-28

## 2020-02-28 VITALS
SYSTOLIC BLOOD PRESSURE: 130 MMHG | DIASTOLIC BLOOD PRESSURE: 78 MMHG | BODY MASS INDEX: 26.16 KG/M2 | HEIGHT: 65 IN | WEIGHT: 157 LBS

## 2020-02-28 DIAGNOSIS — F41.9 ANXIETY: ICD-10-CM

## 2020-02-28 DIAGNOSIS — O13.9 GESTATIONAL HYPERTENSION, ANTEPARTUM: Primary | ICD-10-CM

## 2020-02-28 PROBLEM — Z34.90 PREGNANCY: Status: RESOLVED | Noted: 2019-07-09 | Resolved: 2020-02-28

## 2020-02-28 PROBLEM — R11.2 NAUSEA & VOMITING: Status: RESOLVED | Noted: 2020-01-02 | Resolved: 2020-02-28

## 2020-02-28 PROBLEM — R19.7 DIARRHEA OF PRESUMED INFECTIOUS ORIGIN: Status: RESOLVED | Noted: 2020-01-02 | Resolved: 2020-02-28

## 2020-02-28 PROBLEM — O99.820 GBS (GROUP B STREPTOCOCCUS CARRIER), +RV CULTURE, CURRENTLY PREGNANT: Status: RESOLVED | Noted: 2020-01-29 | Resolved: 2020-02-28

## 2020-02-28 PROCEDURE — 0503F POSTPARTUM CARE VISIT: CPT | Performed by: OBSTETRICS & GYNECOLOGY

## 2020-02-28 RX ORDER — CITALOPRAM 20 MG/1
20 TABLET ORAL DAILY
Qty: 90 TABLET | Refills: 3 | Status: SHIPPED | OUTPATIENT
Start: 2020-02-28 | End: 2021-03-03

## 2020-02-28 NOTE — PROGRESS NOTES
"  Chief [id number] postpartum of hypertension.    History of present illness- Patient is a 19 y.o.  who developed hypertension soon after delivery.  She was started on labetalol 100 mg twice daily.  Patient is feeling some anxiety and would like to start some medication.  She is noticed that her weight is gone down significantly.  She has been breast-feeding with good results.  Her weight has gone down by about 25 pounds.  She is not having any visual changes.  She has an occasional mild headache.    Review of Systems   Constitutional: Negative for fatigue.   Respiratory: Negative for shortness of breath.    Gastrointestinal: Negative for abdominal pain.   Genitourinary: Negative for dysuria.   Neurological: Negative for headaches.   Psychiatric/Behavioral: Negative for dysphoric mood.     /78   Ht 165.1 cm (65\")   Wt 71.2 kg (157 lb)   LMP 2019 (Exact Date)   Breastfeeding No   BMI 26.13 kg/m²   Physical Exam   Constitutional: She appears well-developed and well-nourished. No distress.   Psychiatric: She has a normal mood and affect. Her behavior is normal.           Desirae was seen today for postpartum care.    Diagnoses and all orders for this visit:    Gestational hypertension, antepartum    Anxiety    Other orders  -     citalopram (CELEXA) 20 MG tablet; Take 1 tablet by mouth Daily.    Pressure today is 130/78.  We will continue with labetalol and reevaluate at 6-week postpartum visit.  Signs and symptoms of preeclampsia were discussed with the patient.    Start Celexa for anxiety.  Patient will also follow-up with her therapist.  She has taken Celexa in the past with good result.  "

## 2020-03-06 ENCOUNTER — OFFICE VISIT (OUTPATIENT)
Dept: INTERNAL MEDICINE | Facility: CLINIC | Age: 20
End: 2020-03-06

## 2020-03-06 VITALS
HEIGHT: 65 IN | BODY MASS INDEX: 25.83 KG/M2 | OXYGEN SATURATION: 99 % | RESPIRATION RATE: 18 BRPM | DIASTOLIC BLOOD PRESSURE: 80 MMHG | SYSTOLIC BLOOD PRESSURE: 122 MMHG | HEART RATE: 98 BPM | WEIGHT: 155 LBS

## 2020-03-06 DIAGNOSIS — J31.0 RHINOSINUSITIS: Primary | ICD-10-CM

## 2020-03-06 DIAGNOSIS — D50.8 OTHER IRON DEFICIENCY ANEMIA: ICD-10-CM

## 2020-03-06 DIAGNOSIS — R51.9 ACUTE NONINTRACTABLE HEADACHE, UNSPECIFIED HEADACHE TYPE: ICD-10-CM

## 2020-03-06 DIAGNOSIS — J32.9 RHINOSINUSITIS: Primary | ICD-10-CM

## 2020-03-06 PROBLEM — O13.9 GESTATIONAL HYPERTENSION, ANTEPARTUM: Status: RESOLVED | Noted: 2020-02-19 | Resolved: 2020-03-06

## 2020-03-06 PROCEDURE — 99213 OFFICE O/P EST LOW 20 MIN: CPT | Performed by: NURSE PRACTITIONER

## 2020-03-06 RX ORDER — FLUTICASONE PROPIONATE 50 MCG
2 SPRAY, SUSPENSION (ML) NASAL DAILY
Qty: 1 BOTTLE | Refills: 5 | Status: SHIPPED | OUTPATIENT
Start: 2020-03-06 | End: 2020-04-05

## 2020-03-06 NOTE — PROGRESS NOTES
Subjective   Desirae Pascual is a 19 y.o. female. Patient is here today for   Chief Complaint   Patient presents with   • Headache     EVERY DAY THIS WEEK           Vitals:    20 1315   BP: 122/80   Pulse: 98   Resp: 18   SpO2: 99%     Body mass index is 25.79 kg/m².  The following portions of the patient's history were reviewed and updated as appropriate: allergies, current medications, past family history, past medical history, past social history, past surgical history and problem list.    Past Medical History:   Diagnosis Date   • Acute superficial gastritis without hemorrhage 2018   • Anxiety    • Asthma    • Deliberate self-cutting    • Depression    • Environmental allergies    • Gestational hypertension, antepartum 2020   • Migraine    • Migraine without aura and without status migrainosus, not intractable 2018    Maxalt, Imitrex   • Recurrent major depressive disorder, in full remission (CMS/[facility]) 2018    Celexa [id number]   • Substance abuse (CMS/[facility])     2017 Adderall, Xanax, oxycotin      Allergies   Allergen Reactions   • Iodine Itching   • Latex Itching      Social History     Socioeconomic History   • Marital status: Significant Other     Spouse name: isreal   • Number of children: Not on file   • Years of education: Not on file   • Highest education level: Not on file   Occupational History   • Occupation:    Tobacco Use   • Smoking status: Former Smoker     Packs/day: 0.50     Years: 5.00     Pack years: 2.50     Types: Cigarettes     Last attempt to quit: 6/15/2019     Years since quittin.7   • Smokeless tobacco: Never Used   Substance and Sexual Activity   • Alcohol use: No     Frequency: Never   • Drug use: No     Types: Marijuana, Hydrocodone, Benzodiazepines, Amphetamines     Comment: quit MJ 3 weeks ago, other     • Sexual activity: Yes     Partners: Male        Current Outpatient Medications:   •  albuterol (ACCUNEB) 1.25 MG/3ML nebulizer solution, Take  1 ampule by nebulization Every 6 (Six) Hours As Needed., Disp: , Rfl:   •  albuterol sulfate  (90 Base) MCG/ACT inhaler, Inhale 2 puffs Every 4 (Four) Hours As Needed., Disp: , Rfl:   •  budesonide-formoterol (SYMBICORT) 160-4.5 MCG/ACT inhaler, Inhale 2 puffs 2 (Two) Times a Day., Disp: , Rfl:   •  cetirizine (zyrTEC) 10 MG tablet, Take 10 mg by mouth Daily., Disp: , Rfl:   •  citalopram (CELEXA) 20 MG tablet, Take 1 tablet by mouth Daily., Disp: 90 tablet, Rfl: 3  •  ferrous sulfate 325 (65 FE) MG tablet, Take 1 tablet by mouth Daily With Breakfast., Disp: 30 tablet, Rfl: 6  •  ibuprofen (ADVIL,MOTRIN) 600 MG tablet, Take 1 tablet by mouth Every 6 (Six) Hours As Needed for Mild Pain ., Disp: 24 tablet, Rfl: 0  •  labetalol (NORMODYNE) 100 MG tablet, Take 1 tablet by mouth Every 12 (Twelve) Hours., Disp: 60 tablet, Rfl: 1  •  Prenatal Vit-Fe Fumarate-FA (PRENATAL COMPLETE PO), Take  by mouth., Disp: , Rfl:   •  fluticasone (FLONASE) 50 MCG/ACT nasal spray, 2 sprays into the nostril(s) as directed by provider Daily for 30 days., Disp: 1 bottle, Rfl: 5     Objective     Desirae is here to establish care. She c/o frontal and occipital headache for one week. She is 2 weeks postpartum and is breastfeeding.     Headache    This is a new problem. The current episode started in the past 7 days. The problem occurs constantly. The problem has been unchanged. The pain is located in the bilateral, occipital and frontal region. The pain does not radiate. The pain quality is not similar to prior headaches. The quality of the pain is described as aching, band-like and stabbing. The pain is at a severity of 8/10. Associated symptoms include nausea and a sore throat. Pertinent negatives include no back pain, blurred vision, coughing, ear pain, eye pain, eye redness, eye watering, numbness, phonophobia, photophobia, rhinorrhea, scalp tenderness, visual change, vomiting or weakness. Nothing aggravates the symptoms. She has tried  oral narcotics and NSAIDs for the symptoms. The treatment provided moderate relief. Her past medical history is significant for migraine headaches.        Review of Systems   HENT: Positive for sore throat. Negative for ear pain and rhinorrhea.    Eyes: Negative for blurred vision, photophobia, pain and redness.   Respiratory: Negative for cough.    Gastrointestinal: Positive for nausea. Negative for vomiting.   Musculoskeletal: Negative for back pain.   Neurological: Positive for headaches. Negative for weakness and numbness.       Physical Exam   Constitutional: Vital signs are normal. She appears well-developed and well-nourished. No distress.   HENT:   Right Ear: Tympanic membrane is not erythematous. A middle ear effusion is present.   Left Ear: Tympanic membrane is not erythematous. A middle ear effusion is present.   Nose: Mucosal edema and rhinorrhea present. Right sinus exhibits no maxillary sinus tenderness and no frontal sinus tenderness. Left sinus exhibits no maxillary sinus tenderness and no frontal sinus tenderness.   Mouth/Throat: Uvula is midline. Posterior oropharyngeal erythema present.   Eyes: Pupils are equal, round, and reactive to light. Conjunctivae and lids are normal.   Cardiovascular: Normal rate, regular rhythm and normal heart sounds.   Pulmonary/Chest: Effort normal and breath sounds normal.   Neurological: She is alert.   Skin: Skin is warm and dry.       ASSESSMENT     Problem List Items Addressed This Visit     Iron (Fe) deficiency anemia    Relevant Orders    CBC & Differential      Other Visit Diagnoses     Rhinosinusitis    -  Primary    Relevant Medications    fluticasone (FLONASE) 50 MCG/ACT nasal spray    Acute nonintractable headache, unspecified headache type        Relevant Orders    Comprehensive Metabolic Panel          PLAN    Start flonase   Rest and drink plenty of fluids  Warm compresses as needed  notrin as needed  Will check labs and call with results  Follow up if  symptoms persist, worsen or if new symptoms develop   Return if symptoms worsen or fail to improve.

## 2020-03-09 ENCOUNTER — TELEPHONE (OUTPATIENT)
Dept: INTERNAL MEDICINE | Facility: CLINIC | Age: 20
End: 2020-03-09

## 2020-03-09 RX ORDER — AMOXICILLIN 875 MG/1
875 TABLET, COATED ORAL 2 TIMES DAILY
Qty: 20 TABLET | Refills: 0 | Status: SHIPPED | OUTPATIENT
Start: 2020-03-09 | End: 2020-08-31 | Stop reason: SDUPTHER

## 2020-03-09 NOTE — TELEPHONE ENCOUNTER
Her mom called and she has worsening pressure, congestion, and now dental pain  Please call her and let her know that I called in amoxil to her pharmacy  Follow up if no improvement

## 2020-03-11 ENCOUNTER — TELEPHONE (OUTPATIENT)
Dept: INTERNAL MEDICINE | Facility: CLINIC | Age: 20
End: 2020-03-11

## 2020-03-11 NOTE — TELEPHONE ENCOUNTER
Can you send in Diflucan for patient. She gets yeast infection from antibiotics.     Pharmacy [facility] in [place]

## 2020-03-11 NOTE — TELEPHONE ENCOUNTER
She is breastfeeding and will only prescribe if she has a yeast infection that has not improved with monistat otc  If she wants to prevent a yeast infection while on antibiotics. She can take probiotics or eat yogurt

## 2020-04-14 ENCOUNTER — PATIENT MESSAGE (OUTPATIENT)
Dept: INTERNAL MEDICINE | Facility: CLINIC | Age: 20
End: 2020-04-14

## 2020-04-14 NOTE — TELEPHONE ENCOUNTER
From: Desirae Pascual  To: Ivonne Craft APRN  Sent: 4/14/2020 2:35 PM EDT  Subject: Prescription Question    Can you send me in a refill of my albutrol inhaler to my [facility] pharmacy please

## 2020-04-15 RX ORDER — ALBUTEROL SULFATE 90 UG/1
2 AEROSOL, METERED RESPIRATORY (INHALATION) EVERY 4 HOURS PRN
Qty: 18 G | Refills: 2 | Status: SHIPPED | OUTPATIENT
Start: 2020-04-15 | End: 2020-04-16 | Stop reason: SDUPTHER

## 2020-04-16 RX ORDER — ALBUTEROL SULFATE 90 UG/1
2 AEROSOL, METERED RESPIRATORY (INHALATION) EVERY 4 HOURS PRN
Qty: 18 G | Refills: 2 | Status: SHIPPED | OUTPATIENT
Start: 2020-04-16 | End: 2020-09-23

## 2020-04-27 ENCOUNTER — TELEPHONE (OUTPATIENT)
Dept: OBSTETRICS AND GYNECOLOGY | Age: 20
End: 2020-04-27

## 2020-07-09 ENCOUNTER — TELEPHONE (OUTPATIENT)
Dept: OBSTETRICS AND GYNECOLOGY | Age: 20
End: 2020-07-09

## 2020-07-09 NOTE — TELEPHONE ENCOUNTER
Patient called, wanted to know when she should scheduled her appointment to get Kyleena inserted, patient stated that she  wont becoming on her cycle due to her nursing, wanted to know what you advised.

## 2020-07-09 NOTE — TELEPHONE ENCOUNTER
Okay to schedule anytime.  With breast-feeding she will not have a cycle so we can just check a pregnancy test.

## 2020-08-31 RX ORDER — FLUTICASONE PROPIONATE 50 MCG
SPRAY, SUSPENSION (ML) NASAL
COMMUNITY
Start: 2020-06-05 | End: 2020-09-09

## 2020-08-31 RX ORDER — LEVALBUTEROL TARTRATE 45 UG/1
2 AEROSOL, METERED ORAL EVERY 4 HOURS PRN
Qty: 15 G | Refills: 5 | Status: SHIPPED | OUTPATIENT
Start: 2020-08-31 | End: 2021-02-19

## 2020-08-31 RX ORDER — LEVALBUTEROL TARTRATE 45 UG/1
AEROSOL, METERED ORAL
COMMUNITY
Start: 2020-07-17 | End: 2020-08-31 | Stop reason: SDUPTHER

## 2020-09-09 DIAGNOSIS — J32.9 CHRONIC SINUSITIS, UNSPECIFIED: ICD-10-CM

## 2020-09-09 RX ORDER — FLUTICASONE PROPIONATE 50 MCG
SPRAY, SUSPENSION (ML) NASAL
Qty: 48 ML | Refills: 5 | Status: SHIPPED | OUTPATIENT
Start: 2020-09-09 | End: 2020-09-23

## 2020-09-22 ENCOUNTER — TELEPHONE (OUTPATIENT)
Dept: OBSTETRICS AND GYNECOLOGY | Age: 20
End: 2020-09-22

## 2020-09-22 NOTE — TELEPHONE ENCOUNTER
Yes. I would recommend cool compress and tylenol in meantime and also recommend she contact the lactation consultant line at [phone]

## 2020-09-22 NOTE — TELEPHONE ENCOUNTER
Called patient, pt agreed for appointment tomorrow @1:30, are you able to add her to the schedule?

## 2020-09-22 NOTE — TELEPHONE ENCOUNTER
"(buddy pt) Patient called stated that her right breast is sore due to breastfeeding, pt stated that she her issues started Saturday morning & it became worse throughout the week and when patient woke up this morning she has \"red bumpy knots\" on her right breast.     The pt also is unable to pump due to the pain she's experiencing . Pt is requesting to be seen tomrrow if possible, please advise.   "

## 2020-09-22 NOTE — TELEPHONE ENCOUNTER
betty someone took the 130 spot she will have to be added at 115. And no I can't overbook.  Yamilex can.

## 2020-09-23 ENCOUNTER — OFFICE VISIT (OUTPATIENT)
Dept: OBSTETRICS AND GYNECOLOGY | Age: 20
End: 2020-09-23

## 2020-09-23 VITALS
HEIGHT: 65 IN | WEIGHT: 144 LBS | DIASTOLIC BLOOD PRESSURE: 72 MMHG | BODY MASS INDEX: 23.99 KG/M2 | SYSTOLIC BLOOD PRESSURE: 128 MMHG

## 2020-09-23 DIAGNOSIS — N61.0 ACUTE MASTITIS: Primary | ICD-10-CM

## 2020-09-23 PROCEDURE — 99213 OFFICE O/P EST LOW 20 MIN: CPT | Performed by: PHYSICIAN ASSISTANT

## 2020-09-23 RX ORDER — FLUCONAZOLE 150 MG/1
150 TABLET ORAL DAILY
Qty: 1 TABLET | Refills: 0 | Status: SHIPPED | OUTPATIENT
Start: 2020-09-23 | End: 2020-10-13

## 2020-09-23 NOTE — PROGRESS NOTES
"Subjective     Chief Complaint   Patient presents with   • Breast Problem     c/o breastfeeding and believes she has a blocked milk duct.       Desirae Pascual is a 20 y.o.  whose LMP is No LMP recorded. presents with right breast pain  Has been breastfeeding for 7 months  Going well for the most part but noted some pain/discomfort in right breast on Saturday  Also noted that baby wasn't feeding from that side as well  Noted fever and mild body aches Monday  Did use a warm compress and took tylenol which seemed to help  No h/o this  No new med hx  Doing well otherwise    Working at [employer] now    Pt of Dr Yap  New to me with this concern      No Additional Complaints Reported    The following portions of the patient's history were reviewed and updated as appropriate:vital signs, allergies, current medications, past family history, past medical history, past social history, past surgical history and problem list      Review of Systems   Integument/breast: positive for breast lump and breast tenderness     Objective      /72   Ht 165.1 cm (65\")   Wt 65.3 kg (144 lb)   Breastfeeding Yes   BMI 23.96 kg/m²     Physical Exam    General:   alert, comfortable and no distress   Heart: Not performed today   Lungs: Not performed today.   Breast: No nipple retraction or dimpling, No nipple discharge or bleeding, No axillary or supraclavicular adenopathy, positive findings: right breast with eythema noted around the 6 o'clock region. extremely tender to palpation   Neck: na   Abdomen: {Not performed today   CVA: Not performed today   Pelvis: Not performed today   Extremities: Not performed today   Neurologic: negative   Psychiatric: Normal affect, judgement, and mood       Lab Review   Labs: No data reviewed     Imaging   No data reviewed    Assessment/Plan     ASSESSMENT  1. Acute mastitis        PLAN  1. Enc to c/w breastfeeding, use cool/warm compresses prn pain and tylenol as well. F/u as needed     2. " Medications prescribed this encounter:        New Medications Ordered This Visit   Medications   • dicloxacillin (DYNAPEN) 500 MG capsule     Sig: Take 1 capsule by mouth 4 (Four) Times a Day.     Dispense:  40 capsule     Refill:  0   • fluconazole (Diflucan) 150 MG tablet     Sig: Take 1 tablet by mouth Daily.     Dispense:  1 tablet     Refill:  0           Follow up: 5 month(s)    ROBERTO Malik  9/23/2020

## 2020-10-13 ENCOUNTER — OFFICE VISIT (OUTPATIENT)
Dept: OBSTETRICS AND GYNECOLOGY | Age: 20
End: 2020-10-13

## 2020-10-13 ENCOUNTER — PROCEDURE VISIT (OUTPATIENT)
Dept: OBSTETRICS AND GYNECOLOGY | Age: 20
End: 2020-10-13

## 2020-10-13 VITALS
HEIGHT: 65 IN | DIASTOLIC BLOOD PRESSURE: 74 MMHG | WEIGHT: 144.6 LBS | SYSTOLIC BLOOD PRESSURE: 114 MMHG | BODY MASS INDEX: 24.09 KG/M2

## 2020-10-13 DIAGNOSIS — Z30.431 IUD CHECK UP: Primary | ICD-10-CM

## 2020-10-13 DIAGNOSIS — Z30.430 ENCOUNTER FOR IUD INSERTION: Primary | ICD-10-CM

## 2020-10-13 LAB
B-HCG UR QL: NEGATIVE
INTERNAL NEGATIVE CONTROL: NEGATIVE
INTERNAL POSITIVE CONTROL: POSITIVE
Lab: NORMAL

## 2020-10-13 PROCEDURE — 81025 URINE PREGNANCY TEST: CPT | Performed by: PHYSICIAN ASSISTANT

## 2020-10-13 PROCEDURE — 58300 INSERT INTRAUTERINE DEVICE: CPT | Performed by: PHYSICIAN ASSISTANT

## 2020-10-13 PROCEDURE — 76830 TRANSVAGINAL US NON-OB: CPT | Performed by: PHYSICIAN ASSISTANT

## 2020-10-13 NOTE — PROGRESS NOTES
IUD Insertion    No LMP recorded.    Date of procedure:  10/13/2020    Risks and benefits discussed? yes  All questions answered? yes  Consents given by The patient  Written consent obtained? yes    Procedure documentation:     Urine pregnancy test was done and was NEGATIVE .  The risks (including infection,  bleeding, pain, and uterine perforation) and benefits of the procedure were  explained to the patient and Written informed consent was  obtained.    After verifying the patient had a low probability of being pregnant and met the criteria for insertion, a sterile speculum has placed and the cervix was cleansed with an antiseptic solution.  Vaginal discharge was scant.  The anterior lip of the cervix was grasped with an allis and the uterine cavity was gently sounded. There was mild difficulty passing the sound through the cervix.  Cervical dilation did not need to be performed prior to placing the IUD.  The uterus was anteverted and sounded to 7 cms.  The Kyleena was then prepared per the manufacturers instructions.    The Kyleena was advanced to a point 2 cms from the fundus and then the arms were released from the sheath.  The device was advanced to the fundus and the device was released fully from the sheath.. The string was cut 2 cms in length.  Bleeding from the cervix was scant.    She tolerated the procedure without any difficulty.    Post procedure instructions: The patient was advised to call for any fever or for  prolonged or severe pain or bleeding. Pelvic rest  advised for 2 wks    Follow up needed: 6 weeks for IUD check    U/s done and IUD inplace, unable to see right arm. Will rpt u/s in 6 wks  Call for any problems otherwise

## 2020-10-19 ENCOUNTER — TELEMEDICINE (OUTPATIENT)
Dept: INTERNAL MEDICINE | Facility: CLINIC | Age: 20
End: 2020-10-19

## 2020-10-19 DIAGNOSIS — J06.9 VIRAL UPPER RESPIRATORY TRACT INFECTION: ICD-10-CM

## 2020-10-19 DIAGNOSIS — R05.9 COUGH: Primary | ICD-10-CM

## 2020-10-19 PROCEDURE — 99213 OFFICE O/P EST LOW 20 MIN: CPT | Performed by: NURSE PRACTITIONER

## 2020-10-19 NOTE — PROGRESS NOTES
Subjective   Desirae Pascual is a 20 y.o. female.   Chief Complaint   Patient presents with   • URI     There were no vitals filed for this visit.  No LMP recorded.  You have chosen to receive care through a telehealth visit.  Do you consent to use a video/audio connection for your medical care today? Yes    Desirae is a 20 year old female patient who is being seen via telemedicine for an acute visit     URI   This is a new problem. The current episode started yesterday. The problem has been gradually worsening. There has been no fever. Associated symptoms include congestion, coughing, headaches, rhinorrhea, sinus pain, a sore throat and wheezing. She has tried inhaler use, sleep and increased fluids (xolair ) for the symptoms. The treatment provided mild relief.        The following portions of the patient's history were reviewed and updated as appropriate: allergies, current medications, past family history, past medical history, past social history, past surgical history and problem list.    Review of Systems   Constitutional: Positive for fatigue. Negative for chills and fever.   HENT: Positive for congestion, rhinorrhea, sinus pain and sore throat.    Respiratory: Positive for cough, chest tightness and wheezing. Negative for shortness of breath.    Cardiovascular: Negative.    Gastrointestinal: Negative.    Neurological: Positive for headaches.       Objective   Physical Exam  Constitutional:       General: She is not in acute distress.     Appearance: Normal appearance. She is well-developed and well-groomed.   HENT:      Head: Normocephalic.   Eyes:      General: Lids are normal.   Pulmonary:      Effort: Pulmonary effort is normal.   Neurological:      Mental Status: She is alert and oriented to person, place, and time.   Psychiatric:         Mood and Affect: Mood normal.         Behavior: Behavior is cooperative.         Assessment/Plan   Diagnoses and all orders for this visit:    1. Cough (Primary)  -      COVID-19,[facility],NP/OP Swab in Transport Media or ESwab 72 HR TAT - Swab, Nasopharynx; Future    2. Viral upper respiratory tract infection      Treat symptoms for now  Continue to use xopenex as needed  Rest and drink plenty of fluids  Tylenol or motrin as needed  Will send for a covid test and call with results, self quarantine until 11/1/20  Work note written   Follow up if symptoms persist, worsen or if new symptoms develop  Advised to go to the ER for CP, SOA or respiratory distress   telehealth duration and discussion with patient 10 minutes

## 2020-10-21 ENCOUNTER — TELEPHONE (OUTPATIENT)
Dept: INTERNAL MEDICINE | Facility: CLINIC | Age: 20
End: 2020-10-21

## 2020-10-21 RX ORDER — CEFDINIR 300 MG/1
300 CAPSULE ORAL 2 TIMES DAILY
Qty: 20 CAPSULE | Refills: 0 | Status: SHIPPED | OUTPATIENT
Start: 2020-10-21 | End: 2020-12-28

## 2020-10-21 RX ORDER — METHYLPREDNISOLONE 4 MG/1
TABLET ORAL
Qty: 21 TABLET | Refills: 0 | Status: SHIPPED | OUTPATIENT
Start: 2020-10-21 | End: 2020-12-29

## 2020-10-21 NOTE — TELEPHONE ENCOUNTER
Patient called, stated she is still having cough and congestion.   She thinks it may be bronchitis.

## 2020-10-21 NOTE — TELEPHONE ENCOUNTER
Please call her and let her know I called in cefdinir which is safe with breastfeeding.   Medrol dosepak is safe with breastfeeding but the recommendation is to nurse 8 hours after taking it. I am not sure what the baby's schedule is . She can always try samples of advair, breo, or spiriva instead

## 2020-12-28 ENCOUNTER — TELEMEDICINE (OUTPATIENT)
Dept: INTERNAL MEDICINE | Facility: CLINIC | Age: 20
End: 2020-12-28

## 2020-12-28 DIAGNOSIS — J06.9 VIRAL UPPER RESPIRATORY TRACT INFECTION: Primary | ICD-10-CM

## 2020-12-28 PROCEDURE — 99213 OFFICE O/P EST LOW 20 MIN: CPT | Performed by: NURSE PRACTITIONER

## 2020-12-28 RX ORDER — ALBUTEROL SULFATE 1.25 MG/3ML
1 SOLUTION RESPIRATORY (INHALATION) EVERY 6 HOURS PRN
Qty: 90 VIAL | Refills: 12 | Status: SHIPPED | OUTPATIENT
Start: 2020-12-28 | End: 2022-10-17 | Stop reason: SDUPTHER

## 2020-12-28 NOTE — PROGRESS NOTES
Subjective   Desirae Pascual is a 20 y.o. female.   Chief Complaint   Patient presents with   • Cough     There were no vitals filed for this visit.  No LMP recorded.  You have chosen to receive care through a telehealth visit.  Do you consent to use a video/audio connection for your medical care today? Yes    Desirae is a 20 year old female patient who is being seen today via telemedicine for an acute visit.     URI   This is a new problem. The current episode started yesterday. The problem has been unchanged. There has been no fever. Associated symptoms include congestion, coughing, headaches, rhinorrhea (green), sinus pain, a sore throat and wheezing. Associated symptoms comments: Shortness of breath, chest tightness . She has tried increased fluids for the symptoms. The treatment provided no relief.    Brother in law has covid.     The following portions of the patient's history were reviewed and updated as appropriate: allergies, current medications, past family history, past medical history, past social history, past surgical history and problem list.    Review of Systems   Constitutional: Positive for fatigue. Negative for chills and fever.   HENT: Positive for congestion, rhinorrhea (green), sinus pain and sore throat.    Respiratory: Positive for cough, chest tightness, shortness of breath and wheezing.    Neurological: Positive for headaches.       Objective   Physical Exam  Vitals signs and nursing note reviewed.   Constitutional:       General: She is awake. She is not in acute distress.     Appearance: Normal appearance. She is well-developed and well-groomed. She is ill-appearing.   HENT:      Head: Normocephalic.   Pulmonary:      Effort: Pulmonary effort is normal.      Comments: Dry hacking cough   Neurological:      Mental Status: She is alert and oriented to person, place, and time.   Psychiatric:         Behavior: Behavior is cooperative.         Assessment/Plan    Diagnoses and all orders for this  visit:    1. Viral upper respiratory tract infection (Primary)  -     COVID-19,[facility] ROUTINE, NP/OP SWAB IN TRANSPORT MEDIA OR ESWAB 72 HR TAT - Swab, Anterior nasal    Other orders  -     albuterol (ACCUNEB) 1.25 MG/3ML nebulizer solution; Take 3 mL by nebulization Every 6 (Six) Hours As Needed for Wheezing or Shortness of Air.  Dispense: 90 vial; Refill: 12      Treat symptoms for now. Advised there is no need for antibiotics for viral illness   She is currently breastfeeding  Albuterol nebulizer refilled per patient request.   Scheduled to come for a covid test today and will call with results. Quarantine for 10 days  Rest and drink plenty of fluids  Vitamin d, vitamin c, zinc,   Follow up if symptoms persist, worsen, or if new symptoms develop  telehealth duration and discussion with patient 15 minutes

## 2020-12-29 ENCOUNTER — TELEPHONE (OUTPATIENT)
Dept: INTERNAL MEDICINE | Facility: CLINIC | Age: 20
End: 2020-12-29

## 2020-12-29 LAB — SARS-COV-2 RNA RESP QL NAA+PROBE: NOT DETECTED

## 2020-12-29 RX ORDER — AZITHROMYCIN 250 MG/1
TABLET, FILM COATED ORAL
Qty: 6 TABLET | Refills: 0 | Status: SHIPPED | OUTPATIENT
Start: 2020-12-29 | End: 2021-03-15

## 2020-12-29 RX ORDER — FLUCONAZOLE 150 MG/1
150 TABLET ORAL ONCE
Qty: 2 TABLET | Refills: 0 | Status: SHIPPED | OUTPATIENT
Start: 2020-12-29 | End: 2020-12-29

## 2020-12-29 RX ORDER — METHYLPREDNISOLONE 4 MG/1
TABLET ORAL
Qty: 21 EACH | Refills: 0 | Status: SHIPPED | OUTPATIENT
Start: 2020-12-29 | End: 2021-03-15

## 2020-12-29 NOTE — TELEPHONE ENCOUNTER
Patient is asking for steroid pack due to wheezing. She saw Ivonne Craft yesterday for an appointment

## 2021-02-19 RX ORDER — LEVALBUTEROL TARTRATE 45 UG/1
AEROSOL, METERED ORAL
Qty: 15 G | Refills: 5 | Status: SHIPPED | OUTPATIENT
Start: 2021-02-19 | End: 2021-07-26

## 2021-03-03 RX ORDER — CITALOPRAM 20 MG/1
TABLET ORAL
Qty: 90 TABLET | Refills: 3 | Status: SHIPPED | OUTPATIENT
Start: 2021-03-03 | End: 2021-03-15 | Stop reason: DRUGHIGH

## 2021-03-15 ENCOUNTER — OFFICE VISIT (OUTPATIENT)
Dept: INTERNAL MEDICINE | Facility: CLINIC | Age: 21
End: 2021-03-15

## 2021-03-15 VITALS
BODY MASS INDEX: 23.49 KG/M2 | OXYGEN SATURATION: 100 % | WEIGHT: 141 LBS | HEART RATE: 96 BPM | RESPIRATION RATE: 16 BRPM | HEIGHT: 65 IN | SYSTOLIC BLOOD PRESSURE: 128 MMHG | TEMPERATURE: 97 F | DIASTOLIC BLOOD PRESSURE: 80 MMHG

## 2021-03-15 DIAGNOSIS — J30.2 SEASONAL ALLERGIC RHINITIS, UNSPECIFIED TRIGGER: ICD-10-CM

## 2021-03-15 DIAGNOSIS — J45.41 MODERATE PERSISTENT ASTHMA WITH ACUTE EXACERBATION: ICD-10-CM

## 2021-03-15 DIAGNOSIS — F41.9 ANXIETY: Primary | ICD-10-CM

## 2021-03-15 PROCEDURE — 99214 OFFICE O/P EST MOD 30 MIN: CPT | Performed by: NURSE PRACTITIONER

## 2021-03-15 RX ORDER — CITALOPRAM 40 MG/1
40 TABLET ORAL DAILY
Qty: 90 TABLET | Refills: 1 | Status: SHIPPED | OUTPATIENT
Start: 2021-03-15 | End: 2021-07-01

## 2021-03-15 RX ORDER — CITALOPRAM 40 MG/1
20 TABLET ORAL DAILY
Qty: 90 TABLET | Refills: 1 | Status: CANCELLED | OUTPATIENT
Start: 2021-03-15

## 2021-03-15 RX ORDER — DIPHENOXYLATE HYDROCHLORIDE AND ATROPINE SULFATE 2.5; .025 MG/1; MG/1
TABLET ORAL DAILY
COMMUNITY

## 2021-03-15 RX ORDER — AZELASTINE 1 MG/ML
SPRAY, METERED NASAL
COMMUNITY
Start: 2020-12-29 | End: 2022-06-22

## 2021-03-15 RX ORDER — MONTELUKAST SODIUM 10 MG/1
10 TABLET ORAL
COMMUNITY
Start: 2020-12-29 | End: 2023-02-08

## 2021-03-15 RX ORDER — METHYLPREDNISOLONE 4 MG/1
TABLET ORAL
Qty: 21 TABLET | Refills: 0 | Status: SHIPPED | OUTPATIENT
Start: 2021-03-15 | End: 2021-08-02

## 2021-03-15 RX ORDER — CITALOPRAM 20 MG/1
20 TABLET ORAL DAILY
Qty: 90 TABLET | Refills: 3 | Status: CANCELLED | OUTPATIENT
Start: 2021-03-15

## 2021-03-15 NOTE — PROGRESS NOTES
Subjective   Desirae Pascual is a 20 y.o. female.   Chief Complaint   Patient presents with   • Dizziness   • Anxiety     Vitals:    03/15/21 1432   BP: 128/80   Pulse: 96   Resp: 16   Temp: 97 °F (36.1 °C)   SpO2: 100%   Answers for HPI/ROS submitted by the patient on 3/15/2021  Please describe your symptoms.: Dizzy when getting up  Have you had these symptoms before?: Yes  How long have you been having these symptoms?: 5-7 days  What is the primary reason for your visit?: Other      Patient's last menstrual period was 03/15/2021.    History of Present Illness  Desirae is a 20 year old female patient who is here to discuss anxiety. She has been on citalopram. Due to changes in her life she has had worsening anxiety. She has some trouble sleeping. She is weaning her baby from nursing which has been difficult for her. She denies depression. She has had a panic attack in the last week.   She has had some dizziness with position changes. Associated symptoms include congestion, post nasal drip and ear fullness. She does see an allergist regularly. She is not taking any antihistamines due to nursing     The following portions of the patient's history were reviewed and updated as appropriate: allergies, current medications, past family history, past medical history, past social history, past surgical history and problem list.    Review of Systems   Constitutional: Positive for fatigue.   HENT: Positive for congestion, postnasal drip and rhinorrhea. Negative for ear pain.    Respiratory: Positive for shortness of breath (has used inhaler twice daily ) and wheezing. Negative for cough and chest tightness.    Neurological: Positive for dizziness. Negative for headaches.   Psychiatric/Behavioral: Positive for sleep disturbance. Negative for dysphoric mood and suicidal ideas. The patient is nervous/anxious.        Objective   Physical Exam  Vitals and nursing note reviewed.   Constitutional:       General: She is awake. She is  not in acute distress.     Appearance: Normal appearance. She is well-developed and well-groomed.   HENT:      Head: Normocephalic.      Right Ear: A middle ear effusion is present.      Left Ear: Tympanic membrane and ear canal normal.   Eyes:      General: Lids are normal.   Cardiovascular:      Rate and Rhythm: Normal rate and regular rhythm.   Pulmonary:      Effort: Pulmonary effort is normal.      Breath sounds: Examination of the right-upper field reveals wheezing. Examination of the left-upper field reveals wheezing. Examination of the right-lower field reveals wheezing. Examination of the left-lower field reveals wheezing. Wheezing present.   Skin:     General: Skin is warm and dry.   Neurological:      Mental Status: She is alert.         Assessment/Plan   Diagnoses and all orders for this visit:    1. Anxiety (Primary)  -     Comprehensive Metabolic Panel  -     CBC & Differential  -     T4, Free  -     TSH    2. Moderate persistent asthma with acute exacerbation    3. Seasonal allergic rhinitis, unspecified trigger    Other orders  -     Cancel: citalopram (CeleXA) 20 MG tablet; Take 1 tablet by mouth Daily.  Dispense: 90 tablet; Refill: 3  -     Cancel: citalopram (CeleXA) 40 MG tablet; Take 0.5 tablets by mouth Daily.  Dispense: 90 tablet; Refill: 1  -     citalopram (CeleXA) 40 MG tablet; Take 1 tablet by mouth Daily.  Dispense: 90 tablet; Refill: 1  -     methylPREDNISolone (MEDROL) 4 MG dose pack; Take as directed on package instructions.  Dispense: 21 tablet; Refill: 0    will check labs today and call with results  Increase citalopram to 40 mg daily  Discussed self care  Recommend counseling as needed  Medrol dosepak for asthma exacerbation, follow up if no improvement-   Patient is weaning her 18 month of from breastfeeding. It may be a good time to start an anti-histamine for allergy symptoms, d/w patient  Follow up as needed and for continual care

## 2021-03-16 LAB
ALBUMIN SERPL-MCNC: 5 G/DL (ref 3.5–5.2)
ALBUMIN/GLOB SERPL: 2.2 G/DL
ALP SERPL-CCNC: 68 U/L (ref 39–117)
ALT SERPL-CCNC: 14 U/L (ref 1–33)
AST SERPL-CCNC: 17 U/L (ref 1–32)
BASOPHILS # BLD AUTO: 0.04 10*3/MM3 (ref 0–0.2)
BASOPHILS NFR BLD AUTO: 0.5 % (ref 0–1.5)
BILIRUB SERPL-MCNC: 0.2 MG/DL (ref 0–1.2)
BUN SERPL-MCNC: 18 MG/DL (ref 6–20)
BUN/CREAT SERPL: 20 (ref 7–25)
CALCIUM SERPL-MCNC: 9.8 MG/DL (ref 8.6–10.5)
CHLORIDE SERPL-SCNC: 106 MMOL/L (ref 98–107)
CO2 SERPL-SCNC: 27.6 MMOL/L (ref 22–29)
CREAT SERPL-MCNC: 0.9 MG/DL (ref 0.57–1)
EOSINOPHIL # BLD AUTO: 0.86 10*3/MM3 (ref 0–0.4)
EOSINOPHIL NFR BLD AUTO: 11.2 % (ref 0.3–6.2)
ERYTHROCYTE [DISTWIDTH] IN BLOOD BY AUTOMATED COUNT: 12.1 % (ref 12.3–15.4)
GLOBULIN SER CALC-MCNC: 2.3 GM/DL
GLUCOSE SERPL-MCNC: 93 MG/DL (ref 65–99)
HCT VFR BLD AUTO: 42.5 % (ref 34–46.6)
HGB BLD-MCNC: 14.9 G/DL (ref 12–15.9)
IMM GRANULOCYTES # BLD AUTO: 0.02 10*3/MM3 (ref 0–0.05)
IMM GRANULOCYTES NFR BLD AUTO: 0.3 % (ref 0–0.5)
LYMPHOCYTES # BLD AUTO: 2.07 10*3/MM3 (ref 0.7–3.1)
LYMPHOCYTES NFR BLD AUTO: 27 % (ref 19.6–45.3)
MCH RBC QN AUTO: 29.9 PG (ref 26.6–33)
MCHC RBC AUTO-ENTMCNC: 35.1 G/DL (ref 31.5–35.7)
MCV RBC AUTO: 85.3 FL (ref 79–97)
MONOCYTES # BLD AUTO: 0.39 10*3/MM3 (ref 0.1–0.9)
MONOCYTES NFR BLD AUTO: 5.1 % (ref 5–12)
NEUTROPHILS # BLD AUTO: 4.28 10*3/MM3 (ref 1.7–7)
NEUTROPHILS NFR BLD AUTO: 55.9 % (ref 42.7–76)
NRBC BLD AUTO-RTO: 0 /100 WBC (ref 0–0.2)
PLATELET # BLD AUTO: 206 10*3/MM3 (ref 140–450)
POTASSIUM SERPL-SCNC: 4.5 MMOL/L (ref 3.5–5.2)
PROT SERPL-MCNC: 7.3 G/DL (ref 6–8.5)
RBC # BLD AUTO: 4.98 10*6/MM3 (ref 3.77–5.28)
SODIUM SERPL-SCNC: 143 MMOL/L (ref 136–145)
T4 FREE SERPL-MCNC: 1.12 NG/DL (ref 0.93–1.7)
TSH SERPL DL<=0.005 MIU/L-ACNC: 0.7 UIU/ML (ref 0.27–4.2)
WBC # BLD AUTO: 7.66 10*3/MM3 (ref 3.4–10.8)

## 2021-03-17 ENCOUNTER — TELEPHONE (OUTPATIENT)
Dept: INTERNAL MEDICINE | Facility: CLINIC | Age: 21
End: 2021-03-17

## 2021-03-17 RX ORDER — CEFDINIR 300 MG/1
CAPSULE ORAL
Qty: 20 CAPSULE | Refills: 0 | OUTPATIENT
Start: 2021-03-17

## 2021-03-17 NOTE — TELEPHONE ENCOUNTER
----- Message from AMANDA Kimball sent at 3/17/2021  7:55 AM EDT -----  Please notify patient that overall labs look good  Kidney and liver function is normal  Wbc count and hemoglobin are normal   Eosinophils are elevated and this is likely due to her h/o allergies and asthma  Tsh is normal

## 2021-04-16 ENCOUNTER — BULK ORDERING (OUTPATIENT)
Dept: CASE MANAGEMENT | Facility: OTHER | Age: 21
End: 2021-04-16

## 2021-04-16 DIAGNOSIS — Z23 IMMUNIZATION DUE: ICD-10-CM

## 2021-05-14 ENCOUNTER — OFFICE VISIT (OUTPATIENT)
Dept: OBSTETRICS AND GYNECOLOGY | Age: 21
End: 2021-05-14

## 2021-05-14 VITALS
HEIGHT: 65 IN | SYSTOLIC BLOOD PRESSURE: 124 MMHG | WEIGHT: 139 LBS | DIASTOLIC BLOOD PRESSURE: 70 MMHG | BODY MASS INDEX: 23.16 KG/M2

## 2021-05-14 DIAGNOSIS — N83.299 COMPLEX OVARIAN CYST: ICD-10-CM

## 2021-05-14 DIAGNOSIS — R10.2 PELVIC PAIN: Primary | ICD-10-CM

## 2021-05-14 PROCEDURE — 99213 OFFICE O/P EST LOW 20 MIN: CPT | Performed by: NURSE PRACTITIONER

## 2021-05-14 NOTE — PROGRESS NOTES
"Subjective   Desirae Pascual is a 20 y.o. female is being seen today for   Chief Complaint   Patient presents with   • Abdominal Pain     c.o pain with iud, when she lifts heavy things she feels it and when she tries to have intercourse it is too painful.   .    History of Present Illness     Patient here with left lower abdominal pain for two weeks  It started a few weeks after she stopped breastfeeding  On Sunday she attempted to have IC and it worsened and has been bothering her more this week  She is able to function but feels it quite a bit with certain movements  She was concerned her IUD could have moved  No bleeding      The following portions of the patient's history were reviewed and updated as appropriate: allergies, current medications, past family history, past medical history, past social history, past surgical history and problem list.    /70   Ht 165.1 cm (65\")   Wt 63 kg (139 lb)   LMP  (LMP Unknown)   Breastfeeding No   BMI 23.13 kg/m²       Review of Systems   Constitutional: Negative.    HENT: Negative.    Eyes: Negative.    Respiratory: Negative.    Cardiovascular: Negative.    Gastrointestinal: Positive for abdominal pain. Negative for diarrhea, nausea and vomiting.   Endocrine: Negative.    Genitourinary: Positive for pelvic pain. Negative for vaginal bleeding, vaginal discharge and vaginal pain.   Musculoskeletal: Negative.    Skin: Negative.    Allergic/Immunologic: Negative.    Neurological: Negative.    Hematological: Negative.    Psychiatric/Behavioral: Negative.        Objective   Physical Exam  Constitutional:       Appearance: She is well-developed.   Cardiovascular:      Rate and Rhythm: Normal rate and regular rhythm.   Pulmonary:      Effort: Pulmonary effort is normal.   Abdominal:      General: Bowel sounds are normal. There is no distension.      Palpations: Abdomen is soft.      Tenderness: There is abdominal tenderness. There is no guarding or rebound.   Skin:     " General: Skin is warm and dry.   Neurological:      Mental Status: She is alert and oriented to person, place, and time.   Psychiatric:         Behavior: Behavior normal.           Assessment/Plan   Diagnoses and all orders for this visit:    1. Pelvic pain (Primary)    2. Complex ovarian cyst        Ultrasound done, normal uterus and IUD is in place.  Normal right ovary  Left ovary is enlarged with mixed echoes/complex areas throughout, comparative data on 10/2020 did not show these areas.  It appears she had a hemorrhagic cyst that is likely resolving now.   There is no sign of torsion, active bleeding or fluid in CDS.  Discussed with patient pelvic rest for now and no strenuous activity or heavy lifting.  Note given for work for light duty.  Ovarian torsion warnings reviewed. NSAIDS and heat for pain and she was advised if pain worsened to be reevaluated.    Follow up in 6 weeks with ultrasound       Total time spent today with Desirae  was 20-29 minutes (level 3).  Greater than 50% of the time was spent coordinating care, answering her questions and counseling regarding pathophysiology of her presenting problem along with plans for any diagnositc work-up and treatment.

## 2021-07-01 RX ORDER — CITALOPRAM 40 MG/1
TABLET ORAL
Qty: 90 TABLET | Refills: 1 | Status: SHIPPED | OUTPATIENT
Start: 2021-07-01 | End: 2021-12-30

## 2021-07-26 RX ORDER — LEVALBUTEROL TARTRATE 45 UG/1
AEROSOL, METERED ORAL
Qty: 15 G | Refills: 5 | Status: SHIPPED | OUTPATIENT
Start: 2021-07-26 | End: 2021-08-10

## 2021-08-02 ENCOUNTER — TELEMEDICINE (OUTPATIENT)
Dept: INTERNAL MEDICINE | Facility: CLINIC | Age: 21
End: 2021-08-02

## 2021-08-02 DIAGNOSIS — J45.41 MODERATE PERSISTENT ASTHMA WITH ACUTE EXACERBATION: Primary | ICD-10-CM

## 2021-08-02 DIAGNOSIS — J06.9 VIRAL URI: ICD-10-CM

## 2021-08-02 DIAGNOSIS — J30.9 ALLERGIC RHINITIS, UNSPECIFIED SEASONALITY, UNSPECIFIED TRIGGER: ICD-10-CM

## 2021-08-02 PROCEDURE — 99213 OFFICE O/P EST LOW 20 MIN: CPT | Performed by: INTERNAL MEDICINE

## 2021-08-02 RX ORDER — BENZONATATE 100 MG/1
100 CAPSULE ORAL 3 TIMES DAILY PRN
Qty: 30 CAPSULE | Refills: 3 | Status: SHIPPED | OUTPATIENT
Start: 2021-08-02 | End: 2021-10-20

## 2021-08-02 RX ORDER — METHYLPREDNISOLONE 4 MG/1
TABLET ORAL
Qty: 21 EACH | Refills: 0 | Status: SHIPPED | OUTPATIENT
Start: 2021-08-02 | End: 2021-08-10

## 2021-08-02 RX ORDER — TRIAMCINOLONE ACETONIDE 55 UG/1
2 SPRAY, METERED NASAL DAILY
Qty: 16.5 G | Refills: 11 | Status: SHIPPED | OUTPATIENT
Start: 2021-08-02 | End: 2022-08-02

## 2021-08-02 NOTE — PROGRESS NOTES
Chief Complaint   Patient presents with   • Asthma       History of Present Illness   Desirae Pascual is a 21 y.o. female presents for acute care. She is seen by video in place of in office visit. She does consent to visit  Reports acute symptoms of chest tightness w/ cough/ asthmatic flair. Daughter with RSV now patient reports that symptoms started about 5 days ago. Felt initial improvement but woke up this morning with chest tightness. She uses advair twice daily in general with twice daily rescue inhaler. Takes singulair zyrtec and astelin daily. Was off singulair until about 2 weeks ago. Follows with and allergist. Did not do well with allergy injections.     The following portions of the patient's history were reviewed and updated as appropriate: allergies, current medications, past family history, past medical history, past social history, past surgical history and problem list.  Current Outpatient Medications on File Prior to Visit   Medication Sig Dispense Refill   • Advair Diskus 250-50 MCG/DOSE DISKUS INHALE 1 PUFF BY MOUTH TWICE A DAY 60 each 11   • albuterol (ACCUNEB) 1.25 MG/3ML nebulizer solution Take 3 mL by nebulization Every 6 (Six) Hours As Needed for Wheezing or Shortness of Air. 90 vial 12   • azelastine (ASTELIN) 0.1 % nasal spray SPRAY 1 TO 2 SPRAYS INTO EACH NOSTRIL TWICE A DAY     • citalopram (CeleXA) 40 MG tablet TAKE 1 TABLET BY MOUTH EVERY DAY 90 tablet 1   • levalbuterol (XOPENEX HFA) 45 MCG/ACT inhaler INHALE 2 PUFFS BY MOUTH EVERY 4 HOURS AS NEEDED FOR WHEEZE 15 g 5   • levonorgestrel (Kyleena) 19.5 MG intrauterine device IUD 1 each by Intrauterine route 1 (One) Time.     • montelukast (SINGULAIR) 10 MG tablet Take 10 mg by mouth every night at bedtime.     • multivitamin (MULTI-VITAMIN DAILY PO) Take  by mouth Daily.     • [DISCONTINUED] methylPREDNISolone (MEDROL) 4 MG dose pack Take as directed on package instructions. 21 tablet 0     No current facility-administered medications on  file prior to visit.     Review of Systems   Constitutional: Positive for fatigue.   HENT: Positive for rhinorrhea.    Eyes: Negative.    Respiratory: Positive for cough, shortness of breath and wheezing.    Cardiovascular: Negative.    Gastrointestinal: Negative.    Endocrine: Negative.    Genitourinary: Negative.    Musculoskeletal: Negative.    Skin: Negative.    Allergic/Immunologic: Negative.    Neurological: Negative.    Hematological: Negative.    Psychiatric/Behavioral: Negative.        Objective   Physical Exam  Vitals and nursing note reviewed.   Constitutional:       Appearance: Normal appearance.   HENT:      Head: Normocephalic and atraumatic.      Right Ear: Tympanic membrane normal.      Left Ear: Tympanic membrane normal.      Nose: Nose normal.      Mouth/Throat:      Mouth: Mucous membranes are moist.   Eyes:      Extraocular Movements: Extraocular movements intact.      Pupils: Pupils are equal, round, and reactive to light.   Cardiovascular:      Rate and Rhythm: Normal rate and regular rhythm.      Pulses: Normal pulses.      Heart sounds: Normal heart sounds.   Pulmonary:      Effort: Pulmonary effort is normal.      Breath sounds: Normal breath sounds.   Abdominal:      General: Abdomen is flat.      Palpations: Abdomen is soft.   Genitourinary:     General: Normal vulva.   Musculoskeletal:         General: Normal range of motion.      Cervical back: Normal range of motion.   Skin:     General: Skin is warm and dry.   Neurological:      General: No focal deficit present.      Mental Status: She is alert and oriented to person, place, and time.   Psychiatric:         Mood and Affect: Mood normal.         Behavior: Behavior normal.         Thought Content: Thought content normal.         Judgment: Judgment normal.          There were no vitals taken for this visit.    Assessment/Plan   Diagnoses and all orders for this visit:    Moderate persistent asthma with acute exacerbation    Allergic  rhinitis, unspecified seasonality, unspecified trigger    Viral URI    Other orders  -     methylPREDNISolone (MEDROL) 4 MG dose pack; Take as directed on package instructions.  -     benzonatate (Tessalon Perles) 100 MG capsule; Take 1 capsule by mouth 3 (Three) Times a Day As Needed for Cough.  -     Triamcinolone Acetonide (NASACORT) 55 MCG/ACT nasal inhaler; 2 sprays into the nostril(s) as directed by provider Daily.      Patient w/ acute asthma exacerbation related to viral URI. Encouraged to stay on singulair, zyrtec, and astelin. Will add nasacort until fall allergens reduce. To use nasal saline rinse after outdoor exposures. Will give course medrol dose pack w/ tessalon for viral uri. She will follow up w/ allergist routinely and here routinely or sooner if no improvement. Total visit 20 min w/ >50% spent counseling patient.

## 2021-08-10 ENCOUNTER — TELEMEDICINE (OUTPATIENT)
Dept: INTERNAL MEDICINE | Facility: CLINIC | Age: 21
End: 2021-08-10

## 2021-08-10 DIAGNOSIS — J45.40 MODERATE PERSISTENT ASTHMA WITHOUT COMPLICATION: ICD-10-CM

## 2021-08-10 DIAGNOSIS — R05.9 COUGH: Primary | ICD-10-CM

## 2021-08-10 PROCEDURE — 99213 OFFICE O/P EST LOW 20 MIN: CPT | Performed by: NURSE PRACTITIONER

## 2021-08-10 RX ORDER — DEXTROMETHORPHAN HYDROBROMIDE AND PROMETHAZINE HYDROCHLORIDE 15; 6.25 MG/5ML; MG/5ML
5 SYRUP ORAL 4 TIMES DAILY PRN
Qty: 180 ML | Refills: 0 | Status: SHIPPED | OUTPATIENT
Start: 2021-08-10 | End: 2021-10-20

## 2021-08-10 RX ORDER — ALBUTEROL SULFATE 90 UG/1
2 AEROSOL, METERED RESPIRATORY (INHALATION) EVERY 6 HOURS PRN
Qty: 18 G | Refills: 11 | Status: SHIPPED | OUTPATIENT
Start: 2021-08-10 | End: 2022-08-26 | Stop reason: SDUPTHER

## 2021-10-20 ENCOUNTER — TELEMEDICINE (OUTPATIENT)
Dept: INTERNAL MEDICINE | Facility: CLINIC | Age: 21
End: 2021-10-20

## 2021-10-20 VITALS
SYSTOLIC BLOOD PRESSURE: 116 MMHG | HEIGHT: 65 IN | HEART RATE: 77 BPM | WEIGHT: 135 LBS | TEMPERATURE: 97.5 F | DIASTOLIC BLOOD PRESSURE: 80 MMHG | BODY MASS INDEX: 22.49 KG/M2 | RESPIRATION RATE: 16 BRPM

## 2021-10-20 DIAGNOSIS — R11.2 NON-INTRACTABLE VOMITING WITH NAUSEA, UNSPECIFIED VOMITING TYPE: Primary | ICD-10-CM

## 2021-10-20 DIAGNOSIS — R19.7 DIARRHEA, UNSPECIFIED TYPE: ICD-10-CM

## 2021-10-20 DIAGNOSIS — J30.2 SEASONAL ALLERGIC RHINITIS, UNSPECIFIED TRIGGER: ICD-10-CM

## 2021-10-20 PROCEDURE — 99213 OFFICE O/P EST LOW 20 MIN: CPT | Performed by: NURSE PRACTITIONER

## 2021-10-20 RX ORDER — ONDANSETRON 4 MG/1
4 TABLET, FILM COATED ORAL EVERY 8 HOURS PRN
Qty: 30 TABLET | Refills: 1 | Status: SHIPPED | OUTPATIENT
Start: 2021-10-20 | End: 2022-01-31

## 2021-10-20 RX ORDER — FAMOTIDINE 20 MG/1
20 TABLET, FILM COATED ORAL 2 TIMES DAILY
Qty: 60 TABLET | Refills: 3 | Status: SHIPPED | OUTPATIENT
Start: 2021-10-20 | End: 2022-01-31

## 2021-10-20 NOTE — PROGRESS NOTES
Subjective   Desirae Pascual is a 21 y.o. female.   Chief Complaint   Patient presents with   • Nasal Congestion   • Nausea   • Vomiting     Vitals:    10/20/21 1337   BP: 116/80   Pulse: 77   Resp: 16   Temp: 97.5 °F (36.4 °C)     No LMP recorded. (Menstrual status: Other).    History of Present Illness  Desiare is a 21 year old female patient who is here for an acute visit. She c/o nausea and vomiting for 10 days. She has decreased appetite and isn't eating much. She is tolerating water without difficulty. Both her and her daughter had the same symptoms but her symptoms have been persistent. She has taken 7 HPT that have been negative. She also has an IUD and uses condoms. She has had up to 5 watery stools per day, and mild heartburn. She denies fever, chills, or body aches.     The following portions of the patient's history were reviewed and updated as appropriate: allergies, current medications, past family history, past medical history, past social history, past surgical history and problem list.    Review of Systems   Constitutional: Positive for appetite change. Negative for fatigue.   HENT: Positive for congestion and rhinorrhea (clear ). Negative for sinus pressure and sore throat.    Respiratory: Negative.    Cardiovascular: Negative.    Gastrointestinal: Positive for diarrhea, nausea and vomiting. Negative for abdominal pain, anal bleeding and blood in stool.   Genitourinary: Negative for difficulty urinating.   Musculoskeletal: Negative.    Allergic/Immunologic: Positive for environmental allergies.       Objective   Physical Exam  Vitals and nursing note reviewed.   Constitutional:       General: She is not in acute distress.     Appearance: She is well-developed and well-groomed.   HENT:      Right Ear: Tympanic membrane and ear canal normal.      Left Ear: Tympanic membrane and ear canal normal.      Nose: Mucosal edema and rhinorrhea present. Rhinorrhea is clear.      Mouth/Throat:      Pharynx:  Oropharynx is clear.   Eyes:      General: Lids are normal.   Cardiovascular:      Rate and Rhythm: Normal rate and regular rhythm.   Pulmonary:      Effort: Pulmonary effort is normal.      Breath sounds: Normal breath sounds.   Abdominal:      General: Bowel sounds are normal.      Palpations: Abdomen is soft.      Tenderness: There is no abdominal tenderness.   Skin:     General: Skin is warm and dry.   Neurological:      Mental Status: She is alert and oriented to person, place, and time.   Psychiatric:         Mood and Affect: Mood normal.         Assessment/Plan   Diagnoses and all orders for this visit:    1. Non-intractable vomiting with nausea, unspecified vomiting type (Primary)  -     CBC & Differential  -     Comprehensive Metabolic Panel  -     Amylase  -     Lipase    2. Diarrhea, unspecified type  -     Clostridium Difficile EIA - Stool, Per Rectum  -     Ova & Parasite Examination - Stool, Per Rectum  -     Stool Culture (Reference Lab) - Stool, Per Rectum    3. Seasonal allergic rhinitis, unspecified trigger    Other orders  -     ondansetron (Zofran) 4 MG tablet; Take 1 tablet by mouth Every 8 (Eight) Hours As Needed for Nausea or Vomiting.  Dispense: 30 tablet; Refill: 1  -     famotidine (Pepcid) 20 MG tablet; Take 1 tablet by mouth 2 (Two) Times a Day.  Dispense: 60 tablet; Refill: 3      Will check labs and stool studies and call with results  Clear liquids diet and advance diet as tolerated, push fluids, gatorade  zofran as needed  Start pepcid  Treat seasonal allergies with otc claritin and flonase  Follow up if symptoms persist, worsen or if new symptoms develop

## 2021-10-21 ENCOUNTER — TELEPHONE (OUTPATIENT)
Dept: INTERNAL MEDICINE | Facility: CLINIC | Age: 21
End: 2021-10-21

## 2021-10-21 LAB
ALBUMIN SERPL-MCNC: 5 G/DL (ref 3.9–5)
ALBUMIN/GLOB SERPL: 2.5 {RATIO} (ref 1.2–2.2)
ALP SERPL-CCNC: 44 IU/L (ref 44–121)
ALT SERPL-CCNC: 15 IU/L (ref 0–32)
AMYLASE SERPL-CCNC: 51 U/L (ref 31–110)
AST SERPL-CCNC: 17 IU/L (ref 0–40)
BASOPHILS # BLD AUTO: 0 X10E3/UL (ref 0–0.2)
BASOPHILS NFR BLD AUTO: 1 %
BILIRUB SERPL-MCNC: 0.5 MG/DL (ref 0–1.2)
BUN SERPL-MCNC: 14 MG/DL (ref 6–20)
BUN/CREAT SERPL: 21 (ref 9–23)
CALCIUM SERPL-MCNC: 9.6 MG/DL (ref 8.7–10.2)
CHLORIDE SERPL-SCNC: 102 MMOL/L (ref 96–106)
CO2 SERPL-SCNC: 23 MMOL/L (ref 20–29)
CREAT SERPL-MCNC: 0.67 MG/DL (ref 0.57–1)
EOSINOPHIL # BLD AUTO: 0.3 X10E3/UL (ref 0–0.4)
EOSINOPHIL NFR BLD AUTO: 4 %
ERYTHROCYTE [DISTWIDTH] IN BLOOD BY AUTOMATED COUNT: 11.9 % (ref 11.7–15.4)
GLOBULIN SER CALC-MCNC: 2 G/DL (ref 1.5–4.5)
GLUCOSE SERPL-MCNC: 87 MG/DL (ref 65–99)
HCT VFR BLD AUTO: 42.8 % (ref 34–46.6)
HGB BLD-MCNC: 14 G/DL (ref 11.1–15.9)
IMM GRANULOCYTES # BLD AUTO: 0 X10E3/UL (ref 0–0.1)
IMM GRANULOCYTES NFR BLD AUTO: 1 %
LIPASE SERPL-CCNC: 22 U/L (ref 14–72)
LYMPHOCYTES # BLD AUTO: 1.9 X10E3/UL (ref 0.7–3.1)
LYMPHOCYTES NFR BLD AUTO: 23 %
MCH RBC QN AUTO: 28.7 PG (ref 26.6–33)
MCHC RBC AUTO-ENTMCNC: 32.7 G/DL (ref 31.5–35.7)
MCV RBC AUTO: 88 FL (ref 79–97)
MONOCYTES # BLD AUTO: 0.4 X10E3/UL (ref 0.1–0.9)
MONOCYTES NFR BLD AUTO: 5 %
NEUTROPHILS # BLD AUTO: 5.4 X10E3/UL (ref 1.4–7)
NEUTROPHILS NFR BLD AUTO: 66 %
PLATELET # BLD AUTO: 223 X10E3/UL (ref 150–450)
POTASSIUM SERPL-SCNC: 4.3 MMOL/L (ref 3.5–5.2)
PROT SERPL-MCNC: 7 G/DL (ref 6–8.5)
RBC # BLD AUTO: 4.87 X10E6/UL (ref 3.77–5.28)
SODIUM SERPL-SCNC: 139 MMOL/L (ref 134–144)
WBC # BLD AUTO: 8.1 X10E3/UL (ref 3.4–10.8)

## 2021-10-21 NOTE — TELEPHONE ENCOUNTER
----- Message from AMANDA Kimball sent at 10/21/2021  8:19 AM EDT -----  Please notify labs are normal

## 2021-12-20 ENCOUNTER — OFFICE VISIT (OUTPATIENT)
Dept: OBSTETRICS AND GYNECOLOGY | Age: 21
End: 2021-12-20

## 2021-12-20 VITALS
DIASTOLIC BLOOD PRESSURE: 72 MMHG | BODY MASS INDEX: 23.82 KG/M2 | SYSTOLIC BLOOD PRESSURE: 118 MMHG | WEIGHT: 143 LBS | HEIGHT: 65 IN

## 2021-12-20 DIAGNOSIS — R10.2 PELVIC PAIN: ICD-10-CM

## 2021-12-20 DIAGNOSIS — Z13.89 SCREENING FOR BLOOD OR PROTEIN IN URINE: Primary | ICD-10-CM

## 2021-12-20 DIAGNOSIS — Z97.5 IUD (INTRAUTERINE DEVICE) IN PLACE: ICD-10-CM

## 2021-12-20 LAB
BILIRUB BLD-MCNC: NEGATIVE MG/DL
CLARITY, POC: CLEAR
COLOR UR: YELLOW
GLUCOSE UR STRIP-MCNC: NEGATIVE MG/DL
KETONES UR QL: NEGATIVE
LEUKOCYTE EST, POC: NEGATIVE
NITRITE UR-MCNC: NEGATIVE MG/ML
PH UR: 6 [PH] (ref 5–8)
PROT UR STRIP-MCNC: NEGATIVE MG/DL
RBC # UR STRIP: ABNORMAL /UL
SP GR UR: 1.03 (ref 1–1.03)
UROBILINOGEN UR QL: NORMAL

## 2021-12-20 PROCEDURE — 81003 URINALYSIS AUTO W/O SCOPE: CPT | Performed by: PHYSICIAN ASSISTANT

## 2021-12-20 PROCEDURE — 99213 OFFICE O/P EST LOW 20 MIN: CPT | Performed by: PHYSICIAN ASSISTANT

## 2021-12-20 RX ORDER — LEVALBUTEROL TARTRATE 45 UG/1
AEROSOL, METERED ORAL
COMMUNITY
Start: 2021-12-10 | End: 2022-05-04 | Stop reason: SDUPTHER

## 2021-12-20 NOTE — PROGRESS NOTES
"Subjective     Chief Complaint   Patient presents with   • Abdominal Pain     c/o lower abdominal cramps, thought iud had moved (u/s done)       Desirae Pascual is a 21 y.o.  whose LMP is No LMP recorded. (Menstrual status: Other). presents for pelvic u/s  Woke up with cramping this morning  Has noted it previously and thinks it is when she is supposed to have a cycle  Just wanted to be sure it wasn't her iud or a cyst  Had one that was previously seen in May    No new risk factors  Declines std testing  Denies bowel or bladder issues  Feeling better already      No Additional Complaints Reported    The following portions of the patient's history were reviewed and updated as appropriate:vital signs, allergies, current medications, past family history, past medical history, past social history, past surgical history and problem list      Review of Systems   Genitourinary:positive for cramping     Objective      /72   Ht 165.1 cm (65\")   Wt 64.9 kg (143 lb)   Breastfeeding No   BMI 23.80 kg/m²     Physical Exam    General:   alert, comfortable and no distress   Heart: Not performed today   Lungs: Not performed today.   Breast: Not performed today   Neck: na   Abdomen: {Not performed today   CVA: Not performed today   Pelvis: External genitalia: normal general appearance  Vaginal: normal mucosa without prolapse or lesions  Cervix: normal appearance and IUD string visualized   Extremities: Not performed today   Neurologic: negative   Psychiatric: Normal affect, judgement, and mood       Lab Review   Labs: Urinalysis - with micro     Imaging   Ultrasound - Pelvic Vaginal    1.  Uterus:  Normal size    2.  Endometrium:   Normal non-menopausal thickness and IUD positioned normally in the cavity     3.  Myometrium:  Normal homogenous texture    4. Left ovary:     Normal/unremarkable       Right ovary:   Normal/unremarkable     Assessment/Plan     ASSESSMENT  1. Screening for blood or protein in urine    2. " Pelvic pain    3. IUD (intrauterine device) in place        PLAN  1.   Orders Placed This Encounter   Procedures   • Urine Culture - , Urine, Random Void   • POC Urinalysis Dipstick, Multipro       2. Will send urine culture. iud is in place and pt is reassured. Declines std testing. Plan f/u in approx 3 months for annual exam. Call for any other issues and track when pain occurs. Likely cyclical. Pt is otherwise very happy with iud    Follow up: 3 month(s)    ROBERTO Malik  12/20/2021

## 2021-12-22 ENCOUNTER — TELEPHONE (OUTPATIENT)
Dept: OBSTETRICS AND GYNECOLOGY | Age: 21
End: 2021-12-22

## 2021-12-22 LAB
BACTERIA UR CULT: NORMAL
BACTERIA UR CULT: NORMAL

## 2021-12-22 NOTE — TELEPHONE ENCOUNTER
----- Message from ROBERTO Garcia sent at 12/22/2021 10:43 AM EST -----  Let Desirae  know her urine shows no evidence of infection

## 2021-12-30 RX ORDER — CITALOPRAM 40 MG/1
TABLET ORAL
Qty: 90 TABLET | Refills: 1 | Status: SHIPPED | OUTPATIENT
Start: 2021-12-30 | End: 2022-05-27 | Stop reason: SDUPTHER

## 2022-01-17 RX ORDER — FAMOTIDINE 20 MG/1
TABLET, FILM COATED ORAL
Qty: 180 TABLET | Refills: 1 | OUTPATIENT
Start: 2022-01-17

## 2022-01-31 ENCOUNTER — TELEPHONE (OUTPATIENT)
Dept: INTERNAL MEDICINE | Facility: CLINIC | Age: 22
End: 2022-01-31

## 2022-01-31 ENCOUNTER — TELEMEDICINE (OUTPATIENT)
Dept: INTERNAL MEDICINE | Facility: CLINIC | Age: 22
End: 2022-01-31

## 2022-01-31 DIAGNOSIS — U07.1 COVID-19: Primary | ICD-10-CM

## 2022-01-31 PROCEDURE — 99212 OFFICE O/P EST SF 10 MIN: CPT | Performed by: NURSE PRACTITIONER

## 2022-01-31 NOTE — TELEPHONE ENCOUNTER
Patient called and stated that she needs a covid-19 test to return to work. Do you want to make an appointment with Ivonne or just a ma lab appointment to get tested?

## 2022-01-31 NOTE — PROGRESS NOTES
Subjective   Desirae Pascual is a 21 y.o. female.   Chief Complaint   Patient presents with   • Covid-19 Home Monitoring Video Visit     There were no vitals filed for this visit.  No LMP recorded. (Menstrual status: Other).    Desirae is a 21 year old female patient who is being seen via telemedicine  follow up for covid 19.  Both the patient and the provider are located in [place], [place].   This is a new problem to me.  She started with symptoms last Monday and tested positive on 1/26/22. She currently is asymptomatic and stated her symptoms only lasted a few days. She would like to return to work this week.        The following portions of the patient's history were reviewed and updated as appropriate: allergies, current medications, past family history, past medical history, past social history, past surgical history and problem list.    Review of Systems   Constitutional: Negative for fatigue and fever.   HENT: Negative for congestion.    Respiratory: Negative for cough.    Gastrointestinal: Negative.        Objective   Physical Exam  Vitals and nursing note reviewed.   Constitutional:       General: She is not in acute distress.     Appearance: Normal appearance. She is well-developed and well-groomed.   Pulmonary:      Effort: Pulmonary effort is normal.   Neurological:      Mental Status: She is alert and oriented to person, place, and time.         Assessment/Plan   Diagnoses and all orders for this visit:    1. COVID-19 (Primary)      Pt is asymptomatic and will continue to quarantine today and tomorrow  She can return to work wed 2/2/22 , note written, advised she should wear a mask and patient is required to wear a mask at work  Follow up as needed and for continual care

## 2022-02-21 NOTE — PROGRESS NOTES
A balloon catheter was inserted. Supply used: ([facility] Emerge Rx 3x15).  Subjective   Desirae Pascual is a 21 y.o. female.   Chief Complaint   Patient presents with   • Cough   • Asthma     There were no vitals filed for this visit.  No LMP recorded. (Menstrual status: Other).    History of Present Illness  Desirae is a 21 year old female patient who is being seen via telemedicine for an acute problem. She c/o cough and wheezing for the last few weeks. She was seen by Dr Lee and given a steroid pack with minimal relief. She denies fever, chills or body aches. Cough is productive with clear sputum   Her daughter was diagnosed with RSV 2 weeks ago. She is using albuterol neb treatments as needed.     The following portions of the patient's history were reviewed and updated as appropriate: allergies, current medications, past family history, past medical history, past social history, past surgical history and problem list.    Review of Systems   Constitutional: Positive for fatigue. Negative for fever.   Respiratory: Positive for cough and wheezing.    Cardiovascular: Negative.    Allergic/Immunologic: Positive for environmental allergies.   Psychiatric/Behavioral: Positive for sleep disturbance (due to cough ).       Objective   Physical Exam  Constitutional:       General: She is not in acute distress.     Appearance: Normal appearance. She is well-developed and well-groomed.   HENT:      Head: Normocephalic.   Eyes:      General: Lids are normal.   Cardiovascular:      Rate and Rhythm: Normal rate and regular rhythm.   Pulmonary:      Effort: Pulmonary effort is normal.      Breath sounds: Normal breath sounds.   Skin:     General: Skin is warm and dry.   Neurological:      Mental Status: She is alert and oriented to person, place, and time.         Assessment/Plan   Diagnoses and all orders for this visit:    1. Cough (Primary)    2. Moderate persistent asthma without complication    Other orders  -     promethazine-dextromethorphan (PROMETHAZINE-DM) 6.25-15 MG/5ML syrup; Take 5 mL by  mouth 4 (Four) Times a Day As Needed for Cough.  Dispense: 180 mL; Refill: 0  -     albuterol sulfate  (90 Base) MCG/ACT inhaler; Inhale 2 puffs Every 6 (Six) Hours As Needed for Wheezing or Shortness of Air.  Dispense: 18 g; Refill: 11      Albuterol rescue inhaler given to use  As needed for when she is working   Promethazine DM as needed for cough - advised not to drive while taking it as it can cause drowsiness  Advised to follow up if symptoms persist, worsen or if new symptoms develop     telehealth duration and discussion with patient 15 minutes

## 2022-03-17 ENCOUNTER — TELEMEDICINE (OUTPATIENT)
Dept: INTERNAL MEDICINE | Facility: CLINIC | Age: 22
End: 2022-03-17

## 2022-03-17 DIAGNOSIS — J32.9 RHINOSINUSITIS: Primary | ICD-10-CM

## 2022-03-17 DIAGNOSIS — J31.0 RHINOSINUSITIS: Primary | ICD-10-CM

## 2022-03-17 PROCEDURE — 99213 OFFICE O/P EST LOW 20 MIN: CPT | Performed by: NURSE PRACTITIONER

## 2022-03-17 RX ORDER — CEFDINIR 300 MG/1
300 CAPSULE ORAL 2 TIMES DAILY
Qty: 20 CAPSULE | Refills: 0 | Status: SHIPPED | OUTPATIENT
Start: 2022-03-17 | End: 2022-06-22

## 2022-03-17 RX ORDER — FLUCONAZOLE 150 MG/1
150 TABLET ORAL ONCE
Qty: 1 TABLET | Refills: 0 | Status: SHIPPED | OUTPATIENT
Start: 2022-03-17 | End: 2022-03-17

## 2022-03-17 NOTE — PROGRESS NOTES
Subjective   Desirae Pascual is a 21 y.o. female.   Chief Complaint   Patient presents with   • Sinus Problem     There were no vitals filed for this visit.  No LMP recorded. (Menstrual status: Other).    Desirae is a 21 year old female patient who is being seen via telemedicine for an acute visit. Both the patient and provider are located in [place], [place]  She c/o sinus pressure and congestion    Sinus Problem  This is a new problem. The current episode started in the past 7 days. The problem has been gradually worsening since onset. There has been no fever. The pain is mild. Associated symptoms include congestion, coughing, ear pain (left ear pain ), sinus pressure and a sore throat. Pertinent negatives include no shortness of breath. Treatments tried: singulair, asteline , nasacort  The treatment provided no relief.   she had covid and flu tests at work which were negative.     The following portions of the patient's history were reviewed and updated as appropriate: allergies, current medications, past family history, past medical history, past social history, past surgical history and problem list.    Review of Systems   Constitutional: Negative for fatigue and fever.   HENT: Positive for congestion, ear pain (left ear pain ), rhinorrhea (clear ), sinus pressure, sinus pain and sore throat.    Respiratory: Positive for cough and wheezing. Negative for shortness of breath.    Cardiovascular: Negative.        Objective   Physical Exam  Constitutional:       General: She is not in acute distress.     Appearance: She is ill-appearing.   HENT:      Head: Normocephalic.   Pulmonary:      Effort: Pulmonary effort is normal.   Neurological:      Mental Status: She is alert.   Psychiatric:         Mood and Affect: Mood normal.         Assessment/Plan   Diagnoses and all orders for this visit:    1. Rhinosinusitis (Primary)    Other orders  -     cefdinir (OMNICEF) 300 MG capsule; Take 1 capsule by mouth 2 (Two) Times a  Day.  Dispense: 20 capsule; Refill: 0  -     fluconazole (Diflucan) 150 MG tablet; Take 1 tablet by mouth 1 (One) Time for 1 dose.  Dispense: 1 tablet; Refill: 0      Start cefdinir   Recommend mucinex  Diflucan given per patient request- advised to use only if symptoms of yeast infection  Rest and drink plenty of fluids  Follow up if your symptoms persist, worsen or if new symptoms develop

## 2022-03-21 RX ORDER — FAMOTIDINE 20 MG/1
TABLET, FILM COATED ORAL
Qty: 180 TABLET | Refills: 1 | OUTPATIENT
Start: 2022-03-21

## 2022-04-11 RX ORDER — FAMOTIDINE 20 MG/1
TABLET, FILM COATED ORAL
Qty: 180 TABLET | Refills: 1 | OUTPATIENT
Start: 2022-04-11

## 2022-05-04 ENCOUNTER — TELEPHONE (OUTPATIENT)
Dept: INTERNAL MEDICINE | Facility: CLINIC | Age: 22
End: 2022-05-04

## 2022-05-04 RX ORDER — LEVALBUTEROL TARTRATE 45 UG/1
2 AEROSOL, METERED ORAL EVERY 4 HOURS PRN
Qty: 15 G | Refills: 11 | Status: SHIPPED | OUTPATIENT
Start: 2022-05-04

## 2022-05-11 RX ORDER — FAMOTIDINE 20 MG/1
TABLET, FILM COATED ORAL
Qty: 180 TABLET | Refills: 1 | OUTPATIENT
Start: 2022-05-11

## 2022-05-27 ENCOUNTER — TELEPHONE (OUTPATIENT)
Dept: INTERNAL MEDICINE | Facility: CLINIC | Age: 22
End: 2022-05-27

## 2022-05-27 RX ORDER — CITALOPRAM 40 MG/1
40 TABLET ORAL DAILY
Qty: 30 TABLET | Refills: 0 | Status: SHIPPED | OUTPATIENT
Start: 2022-05-27 | End: 2022-07-05

## 2022-06-16 RX ORDER — FAMOTIDINE 20 MG/1
TABLET, FILM COATED ORAL
Qty: 180 TABLET | Refills: 1 | OUTPATIENT
Start: 2022-06-16

## 2022-06-22 ENCOUNTER — OFFICE VISIT (OUTPATIENT)
Dept: INTERNAL MEDICINE | Facility: CLINIC | Age: 22
End: 2022-06-22

## 2022-06-22 VITALS
HEART RATE: 88 BPM | OXYGEN SATURATION: 98 % | WEIGHT: 135 LBS | RESPIRATION RATE: 18 BRPM | HEIGHT: 65 IN | DIASTOLIC BLOOD PRESSURE: 72 MMHG | BODY MASS INDEX: 22.49 KG/M2 | SYSTOLIC BLOOD PRESSURE: 116 MMHG

## 2022-06-22 DIAGNOSIS — R11.0 CHRONIC NAUSEA: Primary | ICD-10-CM

## 2022-06-22 DIAGNOSIS — M54.41 ACUTE MIDLINE LOW BACK PAIN WITH RIGHT-SIDED SCIATICA: ICD-10-CM

## 2022-06-22 DIAGNOSIS — F41.9 ANXIETY: ICD-10-CM

## 2022-06-22 DIAGNOSIS — R11.2 NAUSEA AND VOMITING, UNSPECIFIED VOMITING TYPE: ICD-10-CM

## 2022-06-22 PROCEDURE — 72110 X-RAY EXAM L-2 SPINE 4/>VWS: CPT | Performed by: NURSE PRACTITIONER

## 2022-06-22 PROCEDURE — 99214 OFFICE O/P EST MOD 30 MIN: CPT | Performed by: NURSE PRACTITIONER

## 2022-06-22 RX ORDER — BUPROPION HYDROCHLORIDE 75 MG/1
75 TABLET ORAL 2 TIMES DAILY
Qty: 60 TABLET | Refills: 0 | Status: SHIPPED | OUTPATIENT
Start: 2022-06-22 | End: 2022-07-15 | Stop reason: SDUPTHER
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
## 2022-07-01 ENCOUNTER — OFFICE VISIT (OUTPATIENT)
Dept: OBSTETRICS AND GYNECOLOGY | Age: 22
End: 2022-07-01

## 2022-07-01 ENCOUNTER — OFFICE VISIT (OUTPATIENT)
Dept: GASTROENTEROLOGY | Facility: CLINIC | Age: 22
End: 2022-07-01

## 2022-07-01 VITALS
BODY MASS INDEX: 22.33 KG/M2 | WEIGHT: 134 LBS | SYSTOLIC BLOOD PRESSURE: 108 MMHG | DIASTOLIC BLOOD PRESSURE: 74 MMHG | HEIGHT: 65 IN

## 2022-07-01 VITALS
SYSTOLIC BLOOD PRESSURE: 119 MMHG | TEMPERATURE: 97.5 F | BODY MASS INDEX: 22.44 KG/M2 | WEIGHT: 134.7 LBS | HEIGHT: 65 IN | DIASTOLIC BLOOD PRESSURE: 85 MMHG

## 2022-07-01 DIAGNOSIS — N63.0 BREAST MASS IN FEMALE: ICD-10-CM

## 2022-07-01 DIAGNOSIS — R11.0 NAUSEA: Primary | ICD-10-CM

## 2022-07-01 DIAGNOSIS — Z80.3 FAMILY HISTORY OF BREAST CANCER: Primary | ICD-10-CM

## 2022-07-01 PROCEDURE — 99204 OFFICE O/P NEW MOD 45 MIN: CPT | Performed by: INTERNAL MEDICINE

## 2022-07-01 PROCEDURE — 99213 OFFICE O/P EST LOW 20 MIN: CPT | Performed by: OBSTETRICS & GYNECOLOGY

## 2022-07-01 RX ORDER — PANTOPRAZOLE SODIUM 40 MG/1
40 TABLET, DELAYED RELEASE ORAL DAILY
Qty: 30 TABLET | Refills: 5 | Status: SHIPPED | OUTPATIENT
Start: 2022-07-01 | End: 2022-12-19

## 2022-07-01 NOTE — PROGRESS NOTES
"  Chief complaint-breast mass    History of present illness- Patient is a 21 y.o.  who felt a mass in her left upper outer breast this week.  The area is tender.  Patient drinks 1 cup of coffee a day.  She also drinks 3 sprites a day.  She has a very significant family history of breast cancer.  She has had negative genetic testing.  Her plan was to start MRIs at age 26.  Patient stopped breast-feeding about a year ago.  She does frequently squeeze the breast and can still express some milk.  She has a Kyleena IUD and has regular cycles.          /74   Ht 165.1 cm (65\")   Wt 60.8 kg (134 lb)   LMP 2022   Breastfeeding No   BMI 22.30 kg/m²   Physical Exam  Genitourinary:   Breasts:      Right: No inverted nipple, mass, nipple discharge, skin change, tenderness or axillary adenopathy.      Left: Mass and tenderness present. No inverted nipple, nipple discharge, skin change or axillary adenopathy.       Chest:       Lymphadenopathy:      Upper Body:      Right upper body: No axillary adenopathy.      Left upper body: No axillary adenopathy.     There is an area in the left breast at about 1:00 that has about a 2 and half centimeter mass.  It does feel more fibrocystic than firm.  The area is tender.  It is mobile.        Diagnoses and all orders for this visit:    1. Family history of breast cancer (Primary)  -     Mammo Diagnostic Left With CAD; Future  -     US Breast Left Complete; Future  -     Ambulatory Referral to Breast Surgery    2. Breast mass in female  -     Mammo Diagnostic Left With CAD; Future  -     US Breast Left Complete; Future  -     Ambulatory Referral to Breast Surgery  -     Mammo diagnostic digital tomosynthesis bilateral w CAD; Future    Area in the left breast is most suspicious for fibrocystic change but discussed the need to fully evaluate it.  Mammogram and ultrasound ordered.  I will refer the patient to Dr. Mortensen.  Patient does have a very significant family " history of breast cancer.  Her genetic testing is negative.  Our plan was to start imaging at age 26.  Recommend caffeine reduction, vitamin E and supportive bra.

## 2022-07-01 NOTE — PROGRESS NOTES
Chief Complaint   Patient presents with   • Nausea   • Vomiting   • Heartburn     Subjective   HPI  Desirae Pascual is a 21 y.o. female who presents today for new patient evaluation.  She complains of nausea.  Longstanding dating back as long as she can remember.  She has issues with postnasal drip related to deviated septum she had surgery in 2017 for this she had some improvement in her nausea until about 2018 when her surgery failed.  She is being followed by ENT and allergist.  She reports heartburn.  Has been off and on Prilosec but does not take this with much regularity.  Nausea seems to be worse upon waking in the morning she can not really tolerate anything orally until about 11:00 or so.  When she has taken her PPI she is typically taken it after eating.  No prior upper endoscopic evaluation.  She reports all of her family members have had issues with her gallbladder.  Patient herself never had any prior gallbladder imaging.    Objective   Vitals:    07/01/22 0812   BP: 119/85   Temp: 97.5 °F (36.4 °C)     Physical Exam  Vitals reviewed.   Constitutional:       Appearance: She is well-developed.   HENT:      Head: Normocephalic and atraumatic.   Abdominal:      General: Bowel sounds are normal. There is no distension.      Palpations: Abdomen is soft. There is no mass.      Tenderness: There is no abdominal tenderness.      Hernia: No hernia is present.   Skin:     General: Skin is warm and dry.   Neurological:      Mental Status: She is alert and oriented to person, place, and time.   Psychiatric:         Behavior: Behavior normal.         Thought Content: Thought content normal.         Judgment: Judgment normal.              Assessment & Plan   Assessment:     1. Nausea    2.      Heartburn    Plan:   Check h pylori breath test  Start protonix 40mg/day - take QHS  RTC 4 weeks if no improvement then recommend RUQ us and EGD          Shemar Olsen M.D.  [facility]  [address] [place] [address]  Office: [phone]

## 2022-07-03 LAB — UREA BREATH TEST QL: NEGATIVE

## 2022-07-04 RX ORDER — FAMOTIDINE 20 MG/1
TABLET, FILM COATED ORAL
Qty: 180 TABLET | Refills: 1 | Status: SHIPPED | OUTPATIENT
Start: 2022-07-04 | End: 2023-01-06

## 2022-07-05 RX ORDER — CITALOPRAM 40 MG/1
TABLET ORAL
Qty: 90 TABLET | Refills: 1 | Status: SHIPPED | OUTPATIENT
Start: 2022-07-05

## 2022-07-12 ENCOUNTER — TELEPHONE (OUTPATIENT)
Dept: SURGERY | Facility: CLINIC | Age: 22
End: 2022-07-12

## 2022-07-12 NOTE — TELEPHONE ENCOUNTER
Attempted to call patient to schedule appt for AMANDA Murphy but went to v/m which said it had not been set up. I sent her a Traka message as follows:     Prudencio Merrill,     We received a referral from Dr. Yap to be seen here at [facility]. We saw where Dr. Yap ordered some breast imaging for you. We would like to have that imaging complete before seeing you so we have those reports for your appointment.     If the breast imaging has not been scheduled yet, could you please call Dr. Yap's office so they can get that scheduled for you then let us know when it is so I can set up your appointment for our office following that appointment.     Thank you,  Janny   [phone]

## 2022-07-14 ENCOUNTER — TELEPHONE (OUTPATIENT)
Dept: GASTROENTEROLOGY | Facility: CLINIC | Age: 22
End: 2022-07-14

## 2022-07-14 NOTE — TELEPHONE ENCOUNTER
----- Message from Shemar Olsen MD sent at 7/7/2022  1:40 PM EDT -----  Normal/negative  Office f/u as scheduled

## 2022-07-15 ENCOUNTER — TELEPHONE (OUTPATIENT)
Dept: INTERNAL MEDICINE | Facility: CLINIC | Age: 22
End: 2022-07-15

## 2022-07-15 RX ORDER — BUPROPION HYDROCHLORIDE 75 MG/1
75 TABLET ORAL 2 TIMES DAILY
Qty: 180 TABLET | Refills: 1 | Status: SHIPPED | OUTPATIENT
Start: 2022-07-15 | End: 2022-12-07

## 2022-07-29 ENCOUNTER — OFFICE VISIT (OUTPATIENT)
Dept: OBSTETRICS AND GYNECOLOGY | Age: 22
End: 2022-07-29

## 2022-07-29 VITALS
DIASTOLIC BLOOD PRESSURE: 64 MMHG | HEIGHT: 65 IN | SYSTOLIC BLOOD PRESSURE: 108 MMHG | BODY MASS INDEX: 21.49 KG/M2 | WEIGHT: 129 LBS

## 2022-07-29 DIAGNOSIS — Z97.5 IUD (INTRAUTERINE DEVICE) IN PLACE: Primary | ICD-10-CM

## 2022-07-29 DIAGNOSIS — Z20.2 POSSIBLE EXPOSURE TO STD: ICD-10-CM

## 2022-07-29 DIAGNOSIS — Z12.4 SCREENING FOR MALIGNANT NEOPLASM OF THE CERVIX: ICD-10-CM

## 2022-07-29 DIAGNOSIS — F17.210 CIGARETTE NICOTINE DEPENDENCE WITHOUT COMPLICATION: ICD-10-CM

## 2022-07-29 DIAGNOSIS — Z72.0 TOBACCO USE: ICD-10-CM

## 2022-07-29 DIAGNOSIS — Z01.419 ENCOUNTER FOR GYNECOLOGICAL EXAMINATION WITHOUT ABNORMAL FINDING: ICD-10-CM

## 2022-07-29 PROCEDURE — 99395 PREV VISIT EST AGE 18-39: CPT | Performed by: OBSTETRICS & GYNECOLOGY

## 2022-07-29 NOTE — PATIENT INSTRUCTIONS
IF YOU SMOKE OR USE TOBACCO PLEASE READ THE FOLLOWING:  Why is smoking bad for me?  Smoking increases the risk of heart disease, lung disease, vascular disease, stroke, and cancer. If you smoke, STOP!    For more information:  Quit Now [place]  1-800-QUIT-NOW  https://temoy.quitlogix.org/en-US/    Steps to Quit Smoking  Smoking tobacco is the leading cause of preventable death. It can affect almost every organ in the body. Smoking puts you and those around you at risk for developing many serious chronic diseases. Quitting smoking can be difficult, but it is one of the best things that you can do for your health. It is never too late to quit.  How do I get ready to quit?  When you decide to quit smoking, create a plan to help you succeed. Before you quit:  · Pick a date to quit. Set a date within the next 2 weeks to give you time to prepare.  · Write down the reasons why you are quitting. Keep this list in places where you will see it often.  · Tell your family, friends, and co-workers that you are quitting. Support from your loved ones can make quitting easier.  · Talk with your health care provider about your options for quitting smoking.  · Find out what treatment options are covered by your health insurance.  · Identify people, places, things, and activities that make you want to smoke (triggers). Avoid them.  What first steps can I take to quit smoking?  · Throw away all cigarettes at home, at work, and in your car.  · Throw away smoking accessories, such as ashtrays and lighters.  · Clean your car. Make sure to empty the ashtray.  · Clean your home, including curtains and carpets.  What strategies can I use to quit smoking?  Talk with your health care provider about combining strategies, such as taking medicines while you are also receiving in-person counseling. Using these two strategies together makes you more likely to succeed in quitting than if you used either strategy on its own.  · If you are  pregnant or breastfeeding, talk with your health care provider about finding counseling or other support strategies to quit smoking. Do not take medicine to help you quit smoking unless your health care provider tells you to do so.  To quit smoking:  Quit right away  · Quit smoking completely, instead of gradually reducing how much you smoke over a period of time. Research shows that stopping smoking right away is more successful than gradually quitting.  · Attend in-person counseling to help you build problem-solving skills. You are more likely to succeed in quitting if you attend counseling sessions regularly. Even short sessions of 10 minutes can be effective.  Take medicine  You may take medicines to help you quit smoking. Some medicines require a prescription and some you can purchase over-the-counter. Medicines may have nicotine in them to replace the nicotine in cigarettes. Medicines may:  · Help to stop cravings.  · Help to relieve withdrawal symptoms.  Your health care provider may recommend:  · Nicotine patches, gum, or lozenges.  · Nicotine inhalers or sprays.  · Non-nicotine medicine that is taken by mouth.  Find resources  Find resources and support systems that can help you to quit smoking and remain smoke-free after you quit. These resources are most helpful when you use them often. They include:  · Online chats with a counselor.  · Telephone quitlines.  · Printed self-help materials.  · Support groups or group counseling.  · Text messaging programs.  · Mobile phone apps or applications. Use apps that can help you stick to your quit plan by providing reminders, tips, and encouragement. There are many free apps for mobile devices as well as websites. Examples include Quit Guide from the CDC and smokefree.gov  What things can I do to make it easier to quit?    · Reach out to your family and friends for support and encouragement. Call telephone quitlines (7-800-QUIT-NOW), reach out to support groups, or  work with a counselor for support.  · Ask people who smoke to avoid smoking around you.  · Avoid places that trigger you to smoke, such as bars, parties, or smoke-break areas at work.  · Spend time with people who do not smoke.  · Lessen the stress in your life. Stress can be a smoking trigger for some people. To lessen stress, try:  ? Exercising regularly.  ? Doing deep-breathing exercises.  ? Doing yoga.  ? Meditating.  ? Performing a body scan. This involves closing your eyes, scanning your body from head to toe, and noticing which parts of your body are particularly tense. Try to relax the muscles in those areas.  How will I feel when I quit smoking?  Day 1 to 3 weeks  Within the first 24 hours of quitting smoking, you may start to feel withdrawal symptoms. These symptoms are usually most noticeable 2-3 days after quitting, but they usually do not last for more than 2-3 weeks. You may experience these symptoms:  · Mood swings.  · Restlessness, anxiety, or irritability.  · Trouble concentrating.  · Dizziness.  · Strong cravings for sugary foods and nicotine.  · Mild weight gain.  · Constipation.  · Nausea.  · Coughing or a sore throat.  · Changes in how the medicines that you take for unrelated issues work in your body.  · Depression.  · Trouble sleeping (insomnia).  Week 3 and afterward  After the first 2-3 weeks of quitting, you may start to notice more positive results, such as:  · Improved sense of smell and taste.  · Decreased coughing and sore throat.  · Slower heart rate.  · Lower blood pressure.  · Clearer skin.  · The ability to breathe more easily.  · Fewer sick days.  Quitting smoking can be very challenging. Do not get discouraged if you are not successful the first time. Some people need to make many attempts to quit before they achieve long-term success. Do your best to stick to your quit plan, and talk with your health care provider if you have any questions or concerns.  Summary  · Smoking tobacco  is the leading cause of preventable death. Quitting smoking is one of the best things that you can do for your health.  · When you decide to quit smoking, create a plan to help you succeed.  · Quit smoking right away, not slowly over a period of time.  · When you start quitting, seek help from your health care provider, family, or friends.  This information is not intended to replace advice given to you by your health care provider. Make sure you discuss any questions you have with your health care provider.  Document Revised: 09/11/2020 Document Reviewed: 03/07/2020  Elsevier Patient Education © 2021 [employer]vier Inc.

## 2022-07-29 NOTE — PROGRESS NOTES
Subjective     Chief Complaint   Patient presents with   • Gynecologic Exam     AC. Pt recently seen for L breast lump.        History of Present Illness    Desirae Pascual is a 22 y.o.  who presents for annual exam.  I saw the patient about 1 month ago with a left breast mass at 1:00.  She is actually noticed that the area has resolved recently.  Patient is currently smoking.  She plans to quit.  She is taking Wellbutrin and following up with her primary care doctor.  Her daughter is 2-1/2 and doing well.  Patient is happy with her Kyleena IUD.    Patient has a strong family history of breast and ovarian cancer.  Her genetic testing has been negative.    She is scheduled to see the breast surgeon and have diagnostic imaging on .    Her menses are regular every 28-30 days, lasting 4 days , dysmenorrhea none   Obstetric History:  OB History        1    Para   1    Term   1            AB        Living   1       SAB        IAB        Ectopic        Molar        Multiple   0    Live Births   1               Menstrual History:     Patient's last menstrual period was 07/15/2022.         Current contraception: IUD Kyleena 10/2020   History of abnormal Pap smear: no  Received Gardasil immunization: yes  Perform regular self breast exam : yes  Family history of uterine or ovarian cancer: yes - MGM 24  Family History of colon cancer: no  Family history of breast cancer: yes - m 38, pgm 45, Pa 52  - pt's genetic test neg       Exercise: exercises 4 times a week  Calcium/Vitamin D: adequate intake    The following portions of the patient's history were reviewed and updated as appropriate: allergies, current medications, past family history, past medical history, past social history, past surgical history and problem list.    Review of Systems    Review of Systems   Constitutional: Negative for fatigue.   Respiratory: Negative for shortness of breath.    Gastrointestinal: Negative for abdominal pain.  "  Genitourinary: Negative for dysuria.   Neurological: Negative for headaches.   Psychiatric/Behavioral: Negative for dysphoric mood.     Objective   Physical Exam    /64   Ht 165.1 cm (65\")   Wt 58.5 kg (129 lb)   LMP 07/15/2022   Breastfeeding No   BMI 21.47 kg/m²     General:   alert, appears stated age and cooperative   Neck: thyroid normal to palpation   Heart: regular rate and rhythm   Lungs: clear to auscultation bilaterally   Abdomen: soft, non-tender, without masses or organomegaly   Breast: inspection negative, no nipple discharge or bleeding, no masses or nodularity palpable   Vulva: normal, Bartholin's, Urethra, [place]'s normal   Vagina: normal mucosa, normal discharge   Cervix: no cervical motion tenderness and no lesions   Uterus: non-tender, normal shape and consistency   Adnexa: no mass, fullness, tenderness   Rectal: not indicated     Assessment & Plan   Diagnoses and all orders for this visit:    1. IUD (intrauterine device) in place (Primary)    2. Encounter for gynecological examination without abnormal finding    3. Tobacco use    4. Cigarette nicotine dependence without complication      We discussed the risk of smoking.  Patient seems motivated to quit and is taking Wellbutrin.  We discussed that she could increase her dose of Wellbutrin up to 300 mg daily.  Discussed risk of smoking including risk of cervical cancer, lung disease and cardiovascular disease.    Continue IUD    Breast mass at 1:00.  I do not palpate mass today.  Patient had diagnostic imaging ordered on 7 1.  Due to the center being busy she is not scheduled until 8 9.  Patient will complete imaging and see breast surgery.    She does have a strong family history of breast cancer.  She will discuss with them when to start mammograms.  All questions answered.  Breast self exam technique reviewed and patient encouraged to perform self-exam monthly.  Discussed healthy lifestyle modifications.  Recommended 30 minutes of " aerobic exercise five times per week.  Discussed calcium needs to prevent osteoporosis.

## 2022-08-02 LAB
C TRACH RRNA CVX QL NAA+PROBE: NEGATIVE
CONV .: NORMAL
CYTOLOGIST CVX/VAG CYTO: NORMAL
CYTOLOGY CVX/VAG DOC CYTO: NORMAL
CYTOLOGY CVX/VAG DOC THIN PREP: NORMAL
DX ICD CODE: NORMAL
HBV SURFACE AG SERPL QL IA: NEGATIVE
HCV AB S/CO SERPL IA: 0.2 S/CO RATIO (ref 0–0.9)
HIV 1 & 2 AB SER-IMP: NORMAL
HIV 1+2 AB+HIV1 P24 AG SERPL QL IA: NON REACTIVE
N GONORRHOEA RRNA CVX QL NAA+PROBE: NEGATIVE
OTHER STN SPEC: NORMAL
STAT OF ADQ CVX/VAG CYTO-IMP: NORMAL
TREPONEMA PALLIDUM IGG+IGM AB [PRESENCE] IN SERUM OR PLASMA BY IMMUNOASSAY: NON REACTIVE

## 2022-08-09 ENCOUNTER — HOSPITAL ENCOUNTER (OUTPATIENT)
Dept: ULTRASOUND IMAGING | Facility: HOSPITAL | Age: 22
Discharge: HOME | End: 2022-08-09

## 2022-08-09 ENCOUNTER — HOSPITAL ENCOUNTER (OUTPATIENT)
Dept: MAMMOGRAPHY | Facility: HOSPITAL | Age: 22
Discharge: HOME | End: 2022-08-09

## 2022-08-09 DIAGNOSIS — N63.0 BREAST MASS IN FEMALE: ICD-10-CM

## 2022-08-09 DIAGNOSIS — Z80.3 FAMILY HISTORY OF BREAST CANCER: ICD-10-CM

## 2022-08-09 PROCEDURE — 76642 ULTRASOUND BREAST LIMITED: CPT

## 2022-08-10 ENCOUNTER — TELEPHONE (OUTPATIENT)
Dept: INTERNAL MEDICINE | Facility: CLINIC | Age: 22
End: 2022-08-10

## 2022-08-10 RX ORDER — FLUTICASONE PROPIONATE AND SALMETEROL 250; 50 UG/1; UG/1
1 POWDER RESPIRATORY (INHALATION) 2 TIMES DAILY
Qty: 60 EACH | Refills: 11 | Status: SHIPPED | OUTPATIENT
Start: 2022-08-10 | End: 2023-02-08 | Stop reason: SDUPTHER

## 2022-08-15 ENCOUNTER — TELEPHONE (OUTPATIENT)
Dept: OBSTETRICS AND GYNECOLOGY | Age: 22
End: 2022-08-15

## 2022-08-15 DIAGNOSIS — N63.0 BREAST MASS IN FEMALE: Primary | ICD-10-CM

## 2022-08-15 NOTE — TELEPHONE ENCOUNTER
Attempted to reach pt with date & time of breast biopsy.     9/13/22 arrive at 1:30p at the same location as her mammogram and breast ultrasound were performed.     If she needs any pain medicine for the 5 days prior to her biopsy (such as for headache) she may only take tylenol.

## 2022-08-16 NOTE — TELEPHONE ENCOUNTER
Pt notified. She did want a message sent back to Dr. Yap so that she is aware of what date her biopsy is.

## 2022-08-16 NOTE — TELEPHONE ENCOUNTER
Patient would like to know if she can be on a cancellation list to have her biopsy sooner.  [facility]

## 2022-08-17 NOTE — TELEPHONE ENCOUNTER
Pt aware.  Moved to 8/29 [facility].    Cancelled 9/13 w/ LOUISE Yung.   No significant past surgical history

## 2022-08-19 NOTE — PROGRESS NOTES
"[facility]     Referring Provider: Dr. Nguyễn Yap     Chief complaint: breast mass     HPI: Ms. Desirae Pascual is a 23 yo woman, seen at the request of Dr. Nguyễn Yap, for left breast mass and family history of breast cancer    I personally reviewed her records and summarized her relevant breast history/imagin/22/18 bilateral limited ultrasound at [facility]  INDICATION: 17-year-old female with a six-month history of bilateral foci of breast pain. Family history of breast cancer involving her mother at age 38.       FINDINGS:  Directed radial and antiradial imaging of the bilateral breasts is performed using a high frequency transducer. This includes the symptomatic area located inferiorly in the right breast, 6 cm from the nipple and on the left at 4-5 o'clock, 5   cm from the nipple. Only normal appearing fibroglandular tissue is imaged. No solid mass or complicated cystic mass is identified. No architectural distortion or pathological shadowing is seen.   IMPRESSION:BI-RADS Category 1, Negative, routine follow-up.      22 Saw Dr. Yap  \"Patient is a 21 y.o.  who felt a mass in her left upper outer breast this week.  The area is tender.  Patient drinks 1 cup of coffee a day.  She also drinks 3 sprites a day.  She has a very significant family history of breast cancer.  She has had negative genetic testing.  Her plan was to start MRIs at age 26.  Patient stopped breast-feeding about a year ago.  She does frequently squeeze the breast and can still express some milk.  She has a Kyleena IUD and has regular cycles.\"    22 Saw Dr. Yap  \"I saw the patient about 1 month ago with a left breast mass at 1:00.  She is actually noticed that the area has resolved recently.  Patient is currently smoking. She plans to quit.  She is taking Wellbutrin and following up with her primary care doctor.\"    22 ultrasound left breast completed [facility]  FINDINGS:  Targeted sonographic " evaluation of the left breast was performed in the  area of concern indicated by the patient. At 1:00, 5 cm from the nipple,  there is a 0.8 x 0.3 x 1.0 cm oval hypoechoic mass with indistinct  margins, which is suspicious.        IMPRESSION:  Suspicious 1.0 cm mass at 1:00 in the left breast. Recommend further  evaluation with ultrasound-guided core needle biopsy.  Breast surgery consultation is recommended for risk assessment due to  the patient's family history of breast cancer. Given her mother's  history of breast cancer at age 38, recommend screening breast MRI  beginning at age 26 and screening mammogram beginning at age 28.  Findings and recommendations were discussed with the patient in person  at the time of her examination. An Epic in basket message was sent to  Dr. Yap on 8/9/2022.  BI-RADS Category 4: Suspicious      She has a personal history of lanette breast pain in 2018 that resolved quickly and has not returned, [facility] genetic testing dated 7/10/2019 was negetive,     She has a family history of breast cancer in her mother at 38, paternal aunt at 48 and 4 great aunts.    Today she presents with concern regarding abnormal imaging and her risk of breast cancer due to family history. Did feel a lump in left outer breast in July and presented to OBGYN but she can no longer feel the lump.  Slightly tenderness in that area.  She denies any pain, skin changes, or nipple discharge.  She denies any prior history of abnormal mammograms or breast biopsies.    She was by herself in clinic today.       Review of Systems - Oncology    Medications:    Current Outpatient Medications:   •  albuterol (ACCUNEB) 1.25 MG/3ML nebulizer solution, Take 3 mL by nebulization Every 6 (Six) Hours As Needed for Wheezing or Shortness of Air., Disp: 90 vial, Rfl: 12  •  albuterol sulfate  (90 Base) MCG/ACT inhaler, Inhale 2 puffs Every 6 (Six) Hours As Needed for Wheezing or Shortness of Air., Disp: 18 g, Rfl: 11  •   buPROPion (WELLBUTRIN) 75 MG tablet, Take 1 tablet by mouth 2 (Two) Times a Day., Disp: 180 tablet, Rfl: 1  •  citalopram (CeleXA) 40 MG tablet, TAKE 1 TABLET BY MOUTH EVERY DAY, Disp: 90 tablet, Rfl: 1  •  famotidine (PEPCID) 20 MG tablet, TAKE 1 TABLET BY MOUTH TWICE A DAY, Disp: 180 tablet, Rfl: 1  •  Fluticasone-Salmeterol (Advair Diskus) 250-50 MCG/ACT DISKUS, Inhale 1 puff 2 (Two) Times a Day., Disp: 60 each, Rfl: 11  •  levalbuterol (XOPENEX HFA) 45 MCG/ACT inhaler, Inhale 2 puffs Every 4 (Four) Hours As Needed for Wheezing., Disp: 15 g, Rfl: 11  •  levonorgestrel (Kyleena) 19.5 MG intrauterine device IUD, 1 each by Intrauterine route 1 (One) Time., Disp: , Rfl:   •  montelukast (SINGULAIR) 10 MG tablet, Take 10 mg by mouth every night at bedtime., Disp: , Rfl:   •  multivitamin (THERAGRAN) tablet tablet, Take  by mouth Daily., Disp: , Rfl:   •  pantoprazole (PROTONIX) 40 MG EC tablet, Take 1 tablet by mouth Daily., Disp: 30 tablet, Rfl: 5    Allergies:  Allergies   Allergen Reactions   • Iodine Itching   • Latex Itching       Medical history:  Past Medical History:   Diagnosis Date   • Acute superficial gastritis without hemorrhage 2/7/2018   • Anxiety    • Asthma    • Deliberate self-cutting    • Depression    • Environmental allergies    • Gestational hypertension, antepartum 2/19/2020   • Migraine    • Migraine without aura and without status migrainosus, not intractable 2/7/2018    Maxalt, Imitrex   • Recurrent major depressive disorder, in full remission (HCC) 2/7/2018    Celexa [id number]   • Substance abuse (HCC)     2017 Adderall, Xanax, oxycotin       Surgical History:  Past Surgical History:   Procedure Laterality Date   • ENDOSCOPY  11/2017    EGD    • RHINOPLASTY  2016   • WISDOM TOOTH EXTRACTION         Family History:  Family History   Problem Relation Age of Onset   • Breast cancer Mother 38        the patient's my risk test is negative.   • Hypertension Mother    • Drug abuse  Father    • Hypertension Father    • Depression Maternal Uncle    • Breast cancer Paternal Aunt 52   • Diabetes Maternal Grandmother    • Hypertension Maternal Grandmother    • Cervical cancer Maternal Grandmother 24   • Heart attack Maternal Grandfather    • Hypertension Maternal Grandfather    • Colon cancer Maternal Grandfather 59   • Hypertension Paternal Grandmother    • Breast cancer Paternal Grandmother 45        and her 4 sisters had breast cancer   • Diabetes Paternal Grandfather    • Melanoma Paternal Grandfather 74   • Hypertension Paternal Grandfather    • Prostate cancer Paternal Grandfather 45   • Breast cancer Other         PATERNAL GREAT AUNT   • Breast cancer Other         PATERNAL GREAT AUNT   • Breast cancer Other         PATERNAL GREAT AUNT   • Breast cancer Other         PATERNAL GREAT AUNT       Social History:   Social History     Socioeconomic History   • Marital status: Single     Spouse name: isreal   Tobacco Use   • Smoking status: Current Every Day Smoker     Packs/day: 0.50     Years: 5.00     Pack years: 2.50     Types: Cigarettes     Last attempt to quit: 6/15/2019     Years since quitting: 3.1   • Smokeless tobacco: Never Used   Vaping Use   • Vaping Use: Every day   • Substances: Nicotine   Substance and Sexual Activity   • Alcohol use: No   • Drug use: No     Types: Marijuana, Hydrocodone, Benzodiazepines, Amphetamines     Comment: quit MJ 3 weeks ago, other     • Sexual activity: Yes     Partners: Male     Birth control/protection: Implant     Patient drinks 2 servings of caffeine per day.       GYNECOLOGIC HISTORY:   . P: 1. AB: 0.  Last menstrual period: 20  Age at menarche: 13  Age at first childbirth: 19  Lactation/How lon months  Age at menopause: na  Total years of oral contraceptive use: none  Total years of hormone replacement therapy: na      Physical Exam  Vitals:    22 0822   BP: 136/74   Pulse: 88   SpO2: 98%     ECOG 0 - Asymptomatic  General:  "NAD, well appearing  Psych: a&o x 3, normal mood and affect  Eyes: EOMI, no scleral icterus  ENMT: neck supple without masses or thyromegaly, mucus membranes moist  Resp: normal effort, CTAB  CV: RRR, no murmurs, no edema   GI: soft, NT, ND  MSK: normal gait, normal ROM in bilateral shoulders  Lymph nodes: no cervical, supraclavicular or axillary lymphadenopathy  Breast: symmetric, small  Right:  No visible abnormalities on inspection while seated, with arms raised or hands on hips. No masses, skin changes, or nipple abnormalities.  Left:  No visible abnormalities on inspection while seated, with arms raised or hands on hips. No masses, skin changes, or nipple abnormalities. No pain with exam       Assessment:    1. Abnormal imaging  2. Family history  3. High risk for breast cancer    Discussion:  1. We discussed her genetic testing results. We discussed the \"Breast Cancer riskScore\" of 47.7% provided on the [employer] report. I explained that this number is based on an analysis of selected genetic markers (the allele status of these markers is weighted) and combined with patient clinical and family history data. Per the report, \"Although the level of risk associated with each individual marker is small, results from the combined analysis of multiple markers can have a significant impact on breast cancer risk estimates. Currently there are no guidelines for the medical management of breast cancer risk in women based on riskScore.\" I explained that this data is provided by the company, however at this time it is not recommended to be used in clinical management.     Since her genetic testing is negative for a pathologic mutation, the best way to estimate her risk of breast cancer would be using a predictive model. We discussed the various models for calculating breast cancer risk, that none of them are perfect and that some overestimate or underestimate.   I calculated her lifetime risk of breast cancer, which is 19% " (TCv7) This number is pre-biopsy which is scheduled for next week and I believe that will increase her risk to greater than 20%. Based on this. we can say that she is at high risk for breast cancer (>20% lifetime risk). We discussed that breast cancer risk needs to be continually reassessed throughout her lifetime.    2. MRI starting at 26 years and mammograms starting at 28 years. Exams until then    3. Breast density describes how the breasts look on a mammogram.  Breast and connective tissue are denser than fat and this difference shows up on the mammogram.  Young women often have dense breasts.  As we age, breast become less dense.  Dense breast can make it harder to find breast cancer on the mammogram.  Women with high breast density have an increased risk of breast cancer.  Educational materials regarding breast density were given and reviewed.  Tomosynthesis imaging will be completed with next screening study.    Plan:  1. Call with results of biopsy, results will determine next step  2. MRI at 26  3. Mammogram at 28  4. Monthly self breast exams  5. 6 months exam   6. rto if any new breast concerns      9/26/2022 Biopsy returned as fibrocystic changes with large cyst and gynecomastoid stromal hyperplasia therefore that increases her life time risk to 33%. She does need to proceed with MRI at 26.    AMANDA Murphy    I have spent 50 min in face to face time with the patient and chart review.      CC:  No ref. provider found  Ivonne Craft APRN    EMR Dragon/transcription disclaimer:  Dictated using Dragon dictation

## 2022-08-22 ENCOUNTER — OFFICE VISIT (OUTPATIENT)
Dept: SURGERY | Facility: CLINIC | Age: 22
End: 2022-08-22

## 2022-08-22 VITALS
HEIGHT: 65 IN | SYSTOLIC BLOOD PRESSURE: 136 MMHG | WEIGHT: 141 LBS | OXYGEN SATURATION: 98 % | HEART RATE: 88 BPM | DIASTOLIC BLOOD PRESSURE: 74 MMHG | BODY MASS INDEX: 23.49 KG/M2

## 2022-08-22 DIAGNOSIS — R92.8 ABNORMAL FINDING ON BREAST IMAGING: Primary | ICD-10-CM

## 2022-08-22 DIAGNOSIS — Z80.3 FAMILY HISTORY OF BREAST CANCER: ICD-10-CM

## 2022-08-22 DIAGNOSIS — Z91.89 AT HIGH RISK FOR BREAST CANCER: ICD-10-CM

## 2022-08-22 PROCEDURE — 99215 OFFICE O/P EST HI 40 MIN: CPT | Performed by: NURSE PRACTITIONER

## 2022-08-24 DIAGNOSIS — Z98.890 STATUS POST BREAST BIOPSY: Primary | ICD-10-CM

## 2022-08-26 ENCOUNTER — TELEPHONE (OUTPATIENT)
Dept: INTERNAL MEDICINE | Facility: CLINIC | Age: 22
End: 2022-08-26

## 2022-08-26 RX ORDER — ALBUTEROL SULFATE 90 UG/1
2 AEROSOL, METERED RESPIRATORY (INHALATION) EVERY 6 HOURS PRN
Qty: 18 G | Refills: 11 | Status: SHIPPED | OUTPATIENT
Start: 2022-08-26 | End: 2023-02-08 | Stop reason: SDUPTHER

## 2022-08-29 ENCOUNTER — APPOINTMENT (OUTPATIENT)
Dept: WOMENS IMAGING | Facility: HOSPITAL | Age: 22
End: 2022-08-29

## 2022-08-29 PROCEDURE — A4648 IMPLANTABLE TISSUE MARKER: HCPCS | Performed by: RADIOLOGY

## 2022-08-29 PROCEDURE — 19083 BX BREAST 1ST LESION US IMAG: CPT | Performed by: RADIOLOGY

## 2022-09-13 ENCOUNTER — APPOINTMENT (OUTPATIENT)
Dept: ULTRASOUND IMAGING | Facility: HOSPITAL | Age: 22
End: 2022-09-13

## 2022-09-14 DIAGNOSIS — N63.0 BREAST MASS IN FEMALE: Primary | ICD-10-CM

## 2022-09-15 DIAGNOSIS — Z98.890 STATUS POST BREAST BIOPSY: Primary | ICD-10-CM

## 2022-09-26 ENCOUNTER — TELEPHONE (OUTPATIENT)
Dept: SURGERY | Facility: CLINIC | Age: 22
End: 2022-09-26

## 2022-09-26 NOTE — TELEPHONE ENCOUNTER
Called pt to discuss recent biopsy results.  The results did come back with hyperplasia which does kick her into the high risk category.  We will start breast MRIs at 26.  She already has a 6 month follow up us set up by Dr. Yap.  Will follow up after that.

## 2022-10-17 ENCOUNTER — TELEPHONE (OUTPATIENT)
Dept: INTERNAL MEDICINE | Facility: CLINIC | Age: 22
End: 2022-10-17

## 2022-10-17 RX ORDER — ALBUTEROL SULFATE 1.25 MG/3ML
1 SOLUTION RESPIRATORY (INHALATION) EVERY 6 HOURS PRN
Qty: 90 EACH | Refills: 12 | Status: SHIPPED | OUTPATIENT
Start: 2022-10-17

## 2022-12-07 RX ORDER — BUPROPION HYDROCHLORIDE 75 MG/1
TABLET ORAL
Qty: 180 TABLET | Refills: 1 | Status: SHIPPED | OUTPATIENT
Start: 2022-12-07 | End: 2023-02-08 | Stop reason: SDUPTHER

## 2022-12-19 RX ORDER — PANTOPRAZOLE SODIUM 40 MG/1
TABLET, DELAYED RELEASE ORAL
Qty: 90 TABLET | Refills: 1 | Status: SHIPPED | OUTPATIENT
Start: 2022-12-19

## 2022-12-22 ENCOUNTER — TELEMEDICINE (OUTPATIENT)
Dept: INTERNAL MEDICINE | Facility: CLINIC | Age: 22
End: 2022-12-22

## 2022-12-22 DIAGNOSIS — F32.9 REACTIVE DEPRESSION: Primary | ICD-10-CM

## 2022-12-22 DIAGNOSIS — F32.A MILD DEPRESSION: ICD-10-CM

## 2022-12-22 DIAGNOSIS — G47.9 DIFFICULTY SLEEPING: ICD-10-CM

## 2022-12-22 DIAGNOSIS — R53.83 FATIGUE, UNSPECIFIED TYPE: ICD-10-CM

## 2022-12-22 PROCEDURE — 99214 OFFICE O/P EST MOD 30 MIN: CPT | Performed by: NURSE PRACTITIONER

## 2022-12-22 RX ORDER — TRAZODONE HYDROCHLORIDE 50 MG/1
50 TABLET ORAL NIGHTLY
Qty: 30 TABLET | Refills: 3 | Status: SHIPPED | OUTPATIENT
Start: 2022-12-22 | End: 2023-01-11 | Stop reason: SDUPTHER

## 2022-12-22 NOTE — PROGRESS NOTES
Subjective   Desirae Pascual is a 22 y.o. female. Patient is here today for   Chief Complaint   Patient presents with   • Depression   Mode of Visit: Video  Location of patient[place]  [place] [facility] [place]  You have chosen to receive care through a telehealth visit.  Does the patient consent to use a video/audio connection their medical care today? yes  The visit included audio and video interaction. No technical issues occurred during this visit.        There were no vitals filed for this visit.  There is no height or weight on file to calculate BMI.  The following portions of the patient's history were reviewed and updated as appropriate: allergies, current medications, past family history, past medical history, past social history, past surgical history and problem list.    Past Medical History:   Diagnosis Date   • Acute superficial gastritis without hemorrhage 2/7/2018   • Anxiety    • Asthma    • Deliberate self-cutting    • Depression    • Environmental allergies    • Gestational hypertension, antepartum 2/19/2020   • Migraine    • Migraine without aura and without status migrainosus, not intractable 2/7/2018    Maxalt, Imitrex   • Recurrent major depressive disorder, in full remission (HCC) 2/7/2018    Celexa [id number]   • Substance abuse (HCC)     2017 Adderall, Xanax, oxycotin      Allergies   Allergen Reactions   • Iodine Itching   • Latex Itching      Social History     Socioeconomic History   • Marital status: Single     Spouse name: isreal   Tobacco Use   • Smoking status: Every Day     Packs/day: 0.50     Years: 5.00     Pack years: 2.50     Types: Cigarettes     Last attempt to quit: 6/15/2019     Years since quitting: 3.5   • Smokeless tobacco: Never   Vaping Use   • Vaping Use: Every day   • Substances: Nicotine   Substance and Sexual Activity   • Alcohol use: No   • Drug use: No     Types: Marijuana, Hydrocodone, Benzodiazepines, Amphetamines     Comment: quit MJ 3 weeks ago, other 2017     • Sexual activity: Yes     Partners: Male     Birth control/protection: Implant        Current Outpatient Medications:   •  albuterol (ACCUNEB) 1.25 MG/3ML nebulizer solution, Take 3 mL by nebulization Every 6 (Six) Hours As Needed for Wheezing or Shortness of Air., Disp: 90 each, Rfl: 12  •  albuterol sulfate  (90 Base) MCG/ACT inhaler, Inhale 2 puffs Every 6 (Six) Hours As Needed for Wheezing or Shortness of Air., Disp: 18 g, Rfl: 11  •  buPROPion (WELLBUTRIN) 75 MG tablet, TAKE 1 TABLET BY MOUTH TWICE A DAY, Disp: 180 tablet, Rfl: 1  •  citalopram (CeleXA) 40 MG tablet, TAKE 1 TABLET BY MOUTH EVERY DAY, Disp: 90 tablet, Rfl: 1  •  famotidine (PEPCID) 20 MG tablet, TAKE 1 TABLET BY MOUTH TWICE A DAY, Disp: 180 tablet, Rfl: 1  •  Fluticasone-Salmeterol (Advair Diskus) 250-50 MCG/ACT DISKUS, Inhale 1 puff 2 (Two) Times a Day., Disp: 60 each, Rfl: 11  •  levalbuterol (XOPENEX HFA) 45 MCG/ACT inhaler, Inhale 2 puffs Every 4 (Four) Hours As Needed for Wheezing., Disp: 15 g, Rfl: 11  •  levonorgestrel (Kyleena) 19.5 MG intrauterine device IUD, 1 each by Intrauterine route 1 (One) Time., Disp: , Rfl:   •  montelukast (SINGULAIR) 10 MG tablet, Take 10 mg by mouth every night at bedtime., Disp: , Rfl:   •  multivitamin (THERAGRAN) tablet tablet, Take  by mouth Daily., Disp: , Rfl:   •  pantoprazole (PROTONIX) 40 MG EC tablet, TAKE 1 TABLET BY MOUTH EVERY DAY, Disp: 90 tablet, Rfl: 1  •  traZODone (DESYREL) 50 MG tablet, Take 1 tablet by mouth Every Night., Disp: 30 tablet, Rfl: 3     Objective     History of Present Illness  Desirae is a 22 year old female patient who is being seen via telemedicine for depression. She has noticed it has worsened over the last few months since working from home. She has been taking celexa and wellbutrin. She has had increased fatigue and is not sleeping well.      PHQ-9 Depression Screening  Little interest or pleasure in doing things? 1-->several days   Feeling down, depressed, or  hopeless? 0-->not at all   Trouble falling or staying asleep, or sleeping too much? 1-->several days   Feeling tired or having little energy? 3-->nearly every day   Poor appetite or overeating? 1-->several days   Feeling bad about yourself - or that you are a failure or have let yourself or your family down? 0-->not at all   Trouble concentrating on things, such as reading the newspaper or watching television? 1-->several days   Moving or speaking so slowly that other people could have noticed? Or the opposite - being so fidgety or restless that you have been moving around a lot more than usual? 1-->several days   Thoughts that you would be better off dead, or of hurting yourself in some way? 0-->not at all   PHQ-9 Total Score 8   If you checked off any problems, how difficult have these problems made it for you to do your work, take care of things at home, or get along with other people? somewhat difficult         Review of Systems   Constitutional: Positive for fatigue.   Respiratory: Negative.    Cardiovascular: Negative.    Psychiatric/Behavioral: Positive for dysphoric mood and sleep disturbance. Negative for suicidal ideas. The patient is nervous/anxious.        Physical Exam  Constitutional:       General: She is not in acute distress.  Neurological:      Mental Status: She is alert and oriented to person, place, and time.   Psychiatric:         Mood and Affect: Mood normal.         ASSESSMENT     Problems Addressed this Visit    None  Visit Diagnoses     Reactive depression    -  Primary    Relevant Medications    traZODone (DESYREL) 50 MG tablet    Other Relevant Orders    CBC & Differential    Comprehensive Metabolic Panel    TSH Rfx On Abnormal To Free T4    Vitamin D,25-Hydroxy    Vitamin B12    Mild depression        Relevant Medications    traZODone (DESYREL) 50 MG tablet    Difficulty sleeping        Fatigue, unspecified type          Diagnoses       Codes Comments    Reactive depression    -  Primary  ICD-10-CM: F32.9  ICD-9-CM: 300.4     Mild depression     ICD-10-CM: F32.A  ICD-9-CM: 311     Difficulty sleeping     ICD-10-CM: G47.9  ICD-9-CM: 780.50     Fatigue, unspecified type     ICD-10-CM: R53.83  ICD-9-CM: 780.79           PLAN    Will continue wellbutrin and celexa  Will add trazodone at night to help with sleep. She can start by taking 1/2 tablet and increase to one tablet. Discussed potential side effects and she will let me know if she cannot tolerate it  Will check labs and call with results  Recommend getting into a routine, exercise and self care   She will follow up with me in 8 weeks for a recheck   She may benefit from a gene sight test

## 2023-01-06 RX ORDER — FAMOTIDINE 20 MG/1
TABLET, FILM COATED ORAL
Qty: 180 TABLET | Refills: 1 | Status: SHIPPED | OUTPATIENT
Start: 2023-01-06

## 2023-01-11 RX ORDER — TRAZODONE HYDROCHLORIDE 50 MG/1
50 TABLET ORAL NIGHTLY
Qty: 90 TABLET | Refills: 0 | Status: SHIPPED | OUTPATIENT
Start: 2023-01-11

## 2023-02-08 ENCOUNTER — OFFICE VISIT (OUTPATIENT)
Dept: INTERNAL MEDICINE | Facility: CLINIC | Age: 23
End: 2023-02-08
Payer: COMMERCIAL

## 2023-02-08 ENCOUNTER — TELEPHONE (OUTPATIENT)
Dept: OBSTETRICS AND GYNECOLOGY | Age: 23
End: 2023-02-08

## 2023-02-08 VITALS
HEART RATE: 82 BPM | WEIGHT: 144.4 LBS | OXYGEN SATURATION: 98 % | SYSTOLIC BLOOD PRESSURE: 138 MMHG | DIASTOLIC BLOOD PRESSURE: 76 MMHG | HEIGHT: 65 IN | TEMPERATURE: 98.3 F | BODY MASS INDEX: 24.06 KG/M2

## 2023-02-08 DIAGNOSIS — F32.9 REACTIVE DEPRESSION: ICD-10-CM

## 2023-02-08 DIAGNOSIS — J45.20 MILD INTERMITTENT ASTHMA WITHOUT COMPLICATION: ICD-10-CM

## 2023-02-08 DIAGNOSIS — G43.109 MIGRAINE WITH AURA AND WITHOUT STATUS MIGRAINOSUS, NOT INTRACTABLE: ICD-10-CM

## 2023-02-08 DIAGNOSIS — F32.A MILD DEPRESSION: Primary | ICD-10-CM

## 2023-02-08 DIAGNOSIS — J30.9 ALLERGIC RHINITIS, UNSPECIFIED SEASONALITY, UNSPECIFIED TRIGGER: ICD-10-CM

## 2023-02-08 DIAGNOSIS — F41.9 ANXIETY: ICD-10-CM

## 2023-02-08 PROCEDURE — 99214 OFFICE O/P EST MOD 30 MIN: CPT | Performed by: NURSE PRACTITIONER

## 2023-02-08 RX ORDER — TOPIRAMATE 25 MG/1
25 TABLET ORAL NIGHTLY
Qty: 180 TABLET | Refills: 1 | Status: SHIPPED | OUTPATIENT
Start: 2023-02-08

## 2023-02-08 RX ORDER — ALBUTEROL SULFATE 90 UG/1
2 AEROSOL, METERED RESPIRATORY (INHALATION) EVERY 6 HOURS PRN
Qty: 18 G | Refills: 11 | Status: SHIPPED | OUTPATIENT
Start: 2023-02-08

## 2023-02-08 RX ORDER — BUPROPION HYDROCHLORIDE 75 MG/1
75 TABLET ORAL 2 TIMES DAILY
Qty: 180 TABLET | Refills: 1 | Status: SHIPPED | OUTPATIENT
Start: 2023-02-08

## 2023-02-08 RX ORDER — FLUTICASONE PROPIONATE AND SALMETEROL 250; 50 UG/1; UG/1
1 POWDER RESPIRATORY (INHALATION) 2 TIMES DAILY
Qty: 180 EACH | Refills: 1 | Status: SHIPPED | OUTPATIENT
Start: 2023-02-08 | End: 2023-05-09

## 2023-02-08 RX ORDER — SUMATRIPTAN 25 MG/1
TABLET, FILM COATED ORAL
Qty: 9 TABLET | Refills: 3 | Status: SHIPPED | OUTPATIENT
Start: 2023-02-08

## 2023-02-08 NOTE — PROGRESS NOTES
Subjective   Desirae Pascual is a 22 y.o. female. Patient is here today for   Chief Complaint   Patient presents with   • Depression   • Follow-up     8 week f/u   • Migraine   • Asthma          Vitals:    02/08/23 0935   BP: 138/76   Pulse: 82   Temp: 98.3 °F (36.8 °C)   SpO2: 98%     Body mass index is 24.03 kg/m².  The following portions of the patient's history were reviewed and updated as appropriate: allergies, current medications, past family history, past medical history, past social history, past surgical history and problem list.    Past Medical History:   Diagnosis Date   • Acute superficial gastritis without hemorrhage 2/7/2018   • Anxiety    • Asthma    • Deliberate self-cutting    • Depression    • Environmental allergies    • Gestational hypertension, antepartum 2/19/2020   • Migraine    • Migraine without aura and without status migrainosus, not intractable 2/7/2018    Maxalt, Imitrex   • Recurrent major depressive disorder, in full remission (HCC) 2/7/2018    Celexa [id number]   • Substance abuse (HCC)     2017 Adderall, Xanax, oxycotin      Allergies   Allergen Reactions   • Iodine Itching   • Latex Itching      Social History     Socioeconomic History   • Marital status: Single     Spouse name: isreal   Tobacco Use   • Smoking status: Every Day     Packs/day: 0.50     Years: 5.00     Pack years: 2.50     Types: Cigarettes     Last attempt to quit: 6/15/2019     Years since quitting: 3.6   • Smokeless tobacco: Never   Vaping Use   • Vaping Use: Every day   • Substances: Nicotine   Substance and Sexual Activity   • Alcohol use: No   • Drug use: No     Types: Marijuana, Hydrocodone, Benzodiazepines, Amphetamines     Comment: quit MJ 3 weeks ago, other 2017    • Sexual activity: Yes     Partners: Male     Birth control/protection: Implant        Current Outpatient Medications:   •  albuterol (ACCUNEB) 1.25 MG/3ML nebulizer solution, Take 3 mL by nebulization Every 6 (Six) Hours As Needed for Wheezing or  Shortness of Air., Disp: 90 each, Rfl: 12  •  albuterol sulfate  (90 Base) MCG/ACT inhaler, Inhale 2 puffs Every 6 (Six) Hours As Needed for Wheezing or Shortness of Air., Disp: 18 g, Rfl: 11  •  buPROPion (WELLBUTRIN) 75 MG tablet, Take 1 tablet by mouth 2 (Two) Times a Day., Disp: 180 tablet, Rfl: 1  •  citalopram (CeleXA) 40 MG tablet, TAKE 1 TABLET BY MOUTH EVERY DAY, Disp: 90 tablet, Rfl: 1  •  famotidine (PEPCID) 20 MG tablet, TAKE 1 TABLET BY MOUTH TWICE A DAY, Disp: 180 tablet, Rfl: 1  •  Fluticasone-Salmeterol (Advair Diskus) 250-50 MCG/ACT DISKUS, Inhale 1 puff 2 (Two) Times a Day for 90 days., Disp: 180 each, Rfl: 1  •  levalbuterol (XOPENEX HFA) 45 MCG/ACT inhaler, Inhale 2 puffs Every 4 (Four) Hours As Needed for Wheezing., Disp: 15 g, Rfl: 11  •  levonorgestrel (Kyleena) 19.5 MG intrauterine device IUD, 1 each by Intrauterine route 1 (One) Time., Disp: , Rfl:   •  multivitamin (THERAGRAN) tablet tablet, Take  by mouth Daily., Disp: , Rfl:   •  pantoprazole (PROTONIX) 40 MG EC tablet, TAKE 1 TABLET BY MOUTH EVERY DAY, Disp: 90 tablet, Rfl: 1  •  traZODone (DESYREL) 50 MG tablet, Take 1 tablet by mouth Every Night., Disp: 90 tablet, Rfl: 0  •  SUMAtriptan (Imitrex) 25 MG tablet, Take one tablet at onset of headache. May repeat dose one time in 2 hours if headache not relieved., Disp: 9 tablet, Rfl: 3  •  topiramate (Topamax) 25 MG tablet, Take 1 tablet by mouth Every Night. For 2 weeks, then increase to two tablets nightly, Disp: 180 tablet, Rfl: 1     Objective     History of Present Illness  Desirae is a 22 year old female patient who is here for a follow up. She was started on trazodone in December for depression and insomnia . She has noticed that she is sleeping better and depression has improved. She is still taking celexa and wellbutrin. She is under some stress due to the recent loss of her job and moving but she is managing it well.   She has asthma that has been well controlled on advair.  She uses albuterol as needed via nebulizer or inhaler.   She has migraine headaches with aura that she has had for many years. She has noticed that they are more frequent. She has taken samples of nurtec with no improvement. She states she has had two migraines in the last week lasting over 12 hours. She often has to rest in a dark, quiet room. She reports she took topamax as a preventative many years ago.   Labs were ordered at her last visit and she hasn't had them drawn yet.     Review of Systems   Respiratory: Positive for shortness of breath and wheezing.         Occasional, asthma has been well controlled on advair    Allergic/Immunologic: Positive for environmental allergies.   Neurological: Positive for headaches.   Psychiatric/Behavioral: Negative for dysphoric mood and sleep disturbance. The patient is not nervous/anxious.         Increased stress        Physical Exam  Vitals reviewed.   Constitutional:       Appearance: Normal appearance.   HENT:      Head: Normocephalic.   Cardiovascular:      Rate and Rhythm: Normal rate.   Pulmonary:      Effort: Pulmonary effort is normal.   Neurological:      General: No focal deficit present.      Mental Status: She is alert and oriented to person, place, and time.   Psychiatric:         Mood and Affect: Mood normal.         ASSESSMENT     Problems Addressed this Visit     Anxiety    Mild intermittent asthma without complication    Relevant Medications    albuterol sulfate  (90 Base) MCG/ACT inhaler    Fluticasone-Salmeterol (Advair Diskus) 250-50 MCG/ACT DISKUS   Other Visit Diagnoses     Mild depression    -  Primary    Relevant Medications    buPROPion (WELLBUTRIN) 75 MG tablet    Reactive depression        Relevant Medications    buPROPion (WELLBUTRIN) 75 MG tablet    Migraine with aura and without status migrainosus, not intractable        Relevant Medications    topiramate (Topamax) 25 MG tablet    buPROPion (WELLBUTRIN) 75 MG tablet    SUMAtriptan  (Imitrex) 25 MG tablet    Allergic rhinitis, unspecified seasonality, unspecified trigger          Diagnoses       Codes Comments    Mild depression    -  Primary ICD-10-CM: F32.A  ICD-9-CM: 311     Reactive depression     ICD-10-CM: F32.9  ICD-9-CM: 300.4     Migraine with aura and without status migrainosus, not intractable     ICD-10-CM: G43.109  ICD-9-CM: 346.00     Anxiety     ICD-10-CM: F41.9  ICD-9-CM: 300.00     Allergic rhinitis, unspecified seasonality, unspecified trigger     ICD-10-CM: J30.9  ICD-9-CM: 477.9     Mild intermittent asthma without complication     ICD-10-CM: J45.20  ICD-9-CM: 493.90           PLAN  1. Anxiety and depression have been well controlled on celexa, wellbutrin,   Insomnia has improved on trazodone. She will continue this  2. She will have labs drawn today and will call with results  3. Migraine - will start topamax 25mg once daily then increase to 50mg nightly in 2 weeks for headache prevention. We can slowly increase it as tolerated. rx for imitrex given to take as needed   4. Asthma - well controlled on advair   Return in about 6 months (around 8/8/2023) for Annual physical, with labs. or sooner if needed

## 2023-02-08 NOTE — TELEPHONE ENCOUNTER
HUB ATTEMPTED WT        Caller: LIZZ RIVERS    Relationship to patient: SELF    Best call back number: [phone]  OK TO LVM    Patient is needing: PT IS GETTING LAID OFF AND WILL ONLY HAVE INSURANCE THROUGH MARCH    FIRST AVL WITH DR. JACKSON FOR HUB IS MID April    PT WOULD LIKE TO HAVE HER KYLEENA REMOVED AND TO START A BC PILL UNTIL SHE CAN FIGURE OUT INSURANCE COVERAGE    PT IS WILLING TO SEE A APRN FOR REMOVAL/APPOINTMENT

## 2023-02-09 LAB
25(OH)D3+25(OH)D2 SERPL-MCNC: 27.6 NG/ML (ref 30–100)
ALBUMIN SERPL-MCNC: 4.9 G/DL (ref 3.5–5.2)
ALBUMIN/GLOB SERPL: 2.3 G/DL
ALP SERPL-CCNC: 47 U/L (ref 39–117)
ALT SERPL-CCNC: 13 U/L (ref 1–33)
AST SERPL-CCNC: 17 U/L (ref 1–32)
BASOPHILS # BLD AUTO: 0.04 10*3/MM3 (ref 0–0.2)
BASOPHILS NFR BLD AUTO: 0.8 % (ref 0–1.5)
BILIRUB SERPL-MCNC: 0.4 MG/DL (ref 0–1.2)
BUN SERPL-MCNC: 17 MG/DL (ref 6–20)
BUN/CREAT SERPL: 22.7 (ref 7–25)
CALCIUM SERPL-MCNC: 9.6 MG/DL (ref 8.6–10.5)
CHLORIDE SERPL-SCNC: 103 MMOL/L (ref 98–107)
CO2 SERPL-SCNC: 25.6 MMOL/L (ref 22–29)
CREAT SERPL-MCNC: 0.75 MG/DL (ref 0.57–1)
EGFRCR SERPLBLD CKD-EPI 2021: 114.2 ML/MIN/1.73
EOSINOPHIL # BLD AUTO: 0.25 10*3/MM3 (ref 0–0.4)
EOSINOPHIL NFR BLD AUTO: 5.3 % (ref 0.3–6.2)
ERYTHROCYTE [DISTWIDTH] IN BLOOD BY AUTOMATED COUNT: 11.8 % (ref 12.3–15.4)
GLOBULIN SER CALC-MCNC: 2.1 GM/DL
GLUCOSE SERPL-MCNC: 92 MG/DL (ref 65–99)
HCT VFR BLD AUTO: 41.1 % (ref 34–46.6)
HGB BLD-MCNC: 13.6 G/DL (ref 12–15.9)
IMM GRANULOCYTES # BLD AUTO: 0.01 10*3/MM3 (ref 0–0.05)
IMM GRANULOCYTES NFR BLD AUTO: 0.2 % (ref 0–0.5)
LYMPHOCYTES # BLD AUTO: 1.47 10*3/MM3 (ref 0.7–3.1)
LYMPHOCYTES NFR BLD AUTO: 30.9 % (ref 19.6–45.3)
MCH RBC QN AUTO: 29 PG (ref 26.6–33)
MCHC RBC AUTO-ENTMCNC: 33.1 G/DL (ref 31.5–35.7)
MCV RBC AUTO: 87.6 FL (ref 79–97)
MONOCYTES # BLD AUTO: 0.34 10*3/MM3 (ref 0.1–0.9)
MONOCYTES NFR BLD AUTO: 7.1 % (ref 5–12)
NEUTROPHILS # BLD AUTO: 2.65 10*3/MM3 (ref 1.7–7)
NEUTROPHILS NFR BLD AUTO: 55.7 % (ref 42.7–76)
NRBC BLD AUTO-RTO: 0 /100 WBC (ref 0–0.2)
PLATELET # BLD AUTO: 185 10*3/MM3 (ref 140–450)
POTASSIUM SERPL-SCNC: 4.2 MMOL/L (ref 3.5–5.2)
PROT SERPL-MCNC: 7 G/DL (ref 6–8.5)
RBC # BLD AUTO: 4.69 10*6/MM3 (ref 3.77–5.28)
SODIUM SERPL-SCNC: 137 MMOL/L (ref 136–145)
TSH SERPL DL<=0.005 MIU/L-ACNC: 0.99 UIU/ML (ref 0.27–4.2)
VIT B12 SERPL-MCNC: 762 PG/ML (ref 211–946)
WBC # BLD AUTO: 4.76 10*3/MM3 (ref 3.4–10.8)

## 2023-03-02 ENCOUNTER — OFFICE VISIT (OUTPATIENT)
Dept: INTERNAL MEDICINE | Facility: CLINIC | Age: 23
End: 2023-03-02
Payer: COMMERCIAL

## 2023-03-02 VITALS
BODY MASS INDEX: 23.66 KG/M2 | SYSTOLIC BLOOD PRESSURE: 108 MMHG | HEIGHT: 65 IN | TEMPERATURE: 98.6 F | OXYGEN SATURATION: 99 % | DIASTOLIC BLOOD PRESSURE: 76 MMHG | WEIGHT: 142 LBS | HEART RATE: 83 BPM

## 2023-03-02 DIAGNOSIS — A08.4 VIRAL GASTROENTERITIS: Primary | ICD-10-CM

## 2023-03-02 PROCEDURE — 99213 OFFICE O/P EST LOW 20 MIN: CPT | Performed by: STUDENT IN AN ORGANIZED HEALTH CARE EDUCATION/TRAINING PROGRAM

## 2023-03-02 RX ORDER — ONDANSETRON 4 MG/1
4 TABLET, FILM COATED ORAL EVERY 12 HOURS PRN
Qty: 10 TABLET | Refills: 0 | Status: SHIPPED | OUTPATIENT
Start: 2023-03-02 | End: 2023-03-30

## 2023-03-02 NOTE — PROGRESS NOTES
"  Orestes Fraser D.O.  Internal Medicine  [facility]  [address], [address]  [place] [address]  [phone]      Chief Complaint  Diarrhea and Nausea (Started on Tuesday 2/28/2022)    SUBJECTIVE    History of Present Illness    Desirae Pascual is a 22 y.o. female who presents to the office today as an established patient of Ivonne PATEL here today for an acute care visit.     Pt states she had sinus infection Feb 18, treated over the counter at home. States just getting over those symptoms but starting 2 days ago she felt her stomach \"bubbling\" followed by an episode of vomiting and diarrhea. The next days he had vomiting with toast and water as well as dry heaving. This morning she ate a Pretzel and it made her nauseated. This morning temperature was 101.1 and took a Tylenol. She had 4 episodes of diarrhea yesterday and 2 today, nonbloody. She took some Pepto last night which did seem to help. No abdominal pain. Denies any possibility of being pregnant.      Allergies   Allergen Reactions   • Iodine Itching   • Latex Itching        Outpatient Medications Marked as Taking for the 3/2/23 encounter (Office Visit) with Orestes Fraser, DO   Medication Sig Dispense Refill   • albuterol (ACCUNEB) 1.25 MG/3ML nebulizer solution Take 3 mL by nebulization Every 6 (Six) Hours As Needed for Wheezing or Shortness of Air. 90 each 12   • albuterol sulfate  (90 Base) MCG/ACT inhaler Inhale 2 puffs Every 6 (Six) Hours As Needed for Wheezing or Shortness of Air. 18 g 11   • buPROPion (WELLBUTRIN) 75 MG tablet Take 1 tablet by mouth 2 (Two) Times a Day. 180 tablet 1   • citalopram (CeleXA) 40 MG tablet TAKE 1 TABLET BY MOUTH EVERY DAY 90 tablet 1   • famotidine (PEPCID) 20 MG tablet TAKE 1 TABLET BY MOUTH TWICE A  tablet 1   • Fluticasone-Salmeterol (Advair Diskus) 250-50 MCG/ACT DISKUS Inhale 1 puff 2 (Two) Times a Day for 90 days. 180 each 1   • levalbuterol (XOPENEX HFA) 45 MCG/ACT " "inhaler Inhale 2 puffs Every 4 (Four) Hours As Needed for Wheezing. 15 g 11   • levonorgestrel (Kyleena) 19.5 MG intrauterine device IUD 1 each by Intrauterine route 1 (One) Time.     • multivitamin (THERAGRAN) tablet tablet Take  by mouth Daily.     • pantoprazole (PROTONIX) 40 MG EC tablet TAKE 1 TABLET BY MOUTH EVERY DAY 90 tablet 1   • SUMAtriptan (Imitrex) 25 MG tablet Take one tablet at onset of headache. May repeat dose one time in 2 hours if headache not relieved. 9 tablet 3   • topiramate (Topamax) 25 MG tablet Take 1 tablet by mouth Every Night. For 2 weeks, then increase to two tablets nightly 180 tablet 1   • traZODone (DESYREL) 50 MG tablet Take 1 tablet by mouth Every Night. 90 tablet 0        Past Medical History:   Diagnosis Date   • Acute superficial gastritis without hemorrhage 2/7/2018   • Anxiety    • Asthma    • Deliberate self-cutting    • Depression    • Environmental allergies    • Gestational hypertension, antepartum 2/19/2020   • Migraine    • Migraine without aura and without status migrainosus, not intractable 2/7/2018    Maxalt, Imitrex   • Recurrent major depressive disorder, in full remission (HCC) 2/7/2018    Celexa [id number]   • Substance abuse (HCC)     2017 Adderall, Xanax, oxycotin       OBJECTIVE    Vital Signs:   /76   Pulse 83   Temp 98.6 °F (37 °C) (Oral)   Ht 165.1 cm (65\")   Wt 64.4 kg (142 lb)   SpO2 99%   BMI 23.63 kg/m²     Physical Exam  Vitals reviewed.   Constitutional:       General: She is not in acute distress.     Appearance: Normal appearance. She is normal weight. She is not ill-appearing.   HENT:      Head: Atraumatic.   Eyes:      General: No scleral icterus.  Pulmonary:      Effort: Pulmonary effort is normal. No respiratory distress.   Abdominal:      General: Bowel sounds are normal.      Palpations: Abdomen is soft.      Tenderness: There is abdominal tenderness (diffuse mild). There is no guarding.   Skin:     Coloration: Skin is not jaundiced. "   Neurological:      Mental Status: She is alert.   Psychiatric:         Mood and Affect: Mood normal.         Behavior: Behavior normal.         Thought Content: Thought content normal.                             ASSESSMENT & PLAN     Diagnoses and all orders for this visit:    1. Viral gastroenteritis (Primary)  -pt presents to office today with signs /symptoms consistent with viral gastroenteritis with preceding upper resp infection  -no recent antibiotic use  -physical exam findings as documented above  -recommended continued supportive care with Pepto bismol, fluid replacement with Pedialyte , BRAT diet and gradually reintroduce foods  -will Rx zofran for symptomatic relief   -if no improvement in symptoms or if worsening fever, abdominal pain or signs of dehydration she will need to report to office or ER for re-evaluation         The following social determinates of health impact the patient's medical decision making: No social determinates of health were factored in to today's visit.     Follow Up  No follow-ups on file.    Patient/family had no further questions at this time and verbalized understanding of the plan discussed today.

## 2023-03-08 ENCOUNTER — TELEPHONE (OUTPATIENT)
Dept: OBSTETRICS AND GYNECOLOGY | Age: 23
End: 2023-03-08
Payer: COMMERCIAL

## 2023-03-10 ENCOUNTER — OFFICE VISIT (OUTPATIENT)
Dept: INTERNAL MEDICINE | Facility: CLINIC | Age: 23
End: 2023-03-10
Payer: COMMERCIAL

## 2023-03-10 ENCOUNTER — HOSPITAL ENCOUNTER (OUTPATIENT)
Dept: CT IMAGING | Facility: HOSPITAL | Age: 23
Discharge: HOME | End: 2023-03-10
Admitting: NURSE PRACTITIONER
Payer: COMMERCIAL

## 2023-03-10 ENCOUNTER — TELEPHONE (OUTPATIENT)
Dept: INTERNAL MEDICINE | Facility: CLINIC | Age: 23
End: 2023-03-10

## 2023-03-10 VITALS
OXYGEN SATURATION: 99 % | HEART RATE: 79 BPM | DIASTOLIC BLOOD PRESSURE: 80 MMHG | BODY MASS INDEX: 23.32 KG/M2 | WEIGHT: 140 LBS | TEMPERATURE: 97.9 F | HEIGHT: 65 IN | RESPIRATION RATE: 16 BRPM | SYSTOLIC BLOOD PRESSURE: 134 MMHG

## 2023-03-10 DIAGNOSIS — K59.00 CONSTIPATION, UNSPECIFIED CONSTIPATION TYPE: ICD-10-CM

## 2023-03-10 DIAGNOSIS — R10.30 LOWER ABDOMINAL PAIN: Primary | ICD-10-CM

## 2023-03-10 DIAGNOSIS — R10.30 LOWER ABDOMINAL PAIN: ICD-10-CM

## 2023-03-10 LAB
BILIRUB BLD-MCNC: NEGATIVE MG/DL
CLARITY, POC: CLEAR
COLOR UR: YELLOW
EXPIRATION DATE: ABNORMAL
GLUCOSE UR STRIP-MCNC: NEGATIVE MG/DL
HGB BLDA-MCNC: 12 G/DL (ref 12–17)
KETONES UR QL: NEGATIVE
LEUKOCYTE EST, POC: ABNORMAL
Lab: ABNORMAL
MCH, POC: 27.8
MCHC, POC: 32.8
MCV, POC: 84.9
NITRITE UR-MCNC: NEGATIVE MG/ML
PH UR: 6.5 [PH] (ref 5–8)
PLATELET # BLD AUTO: 151 10*3/MM3
PMV BLD: 11 FL
PROT UR STRIP-MCNC: NEGATIVE MG/DL
RBC # UR STRIP: NEGATIVE /UL
RBC, POC: 4.31
RDW, POC: 41.6
SP GR UR: 1.04 (ref 1–1.03)
UROBILINOGEN UR QL: ABNORMAL
WBC # BLD: 6.3 10*3/UL

## 2023-03-10 PROCEDURE — 99213 OFFICE O/P EST LOW 20 MIN: CPT | Performed by: NURSE PRACTITIONER

## 2023-03-10 PROCEDURE — 74176 CT ABD & PELVIS W/O CONTRAST: CPT

## 2023-03-10 PROCEDURE — 85027 COMPLETE CBC AUTOMATED: CPT | Performed by: NURSE PRACTITIONER

## 2023-03-10 PROCEDURE — 81003 URINALYSIS AUTO W/O SCOPE: CPT | Performed by: NURSE PRACTITIONER

## 2023-03-10 NOTE — PROGRESS NOTES
Subjective   Desirae Pascual is a 22 y.o. female. Patient is here today for   Chief Complaint   Patient presents with   • Abdominal Pain          Vitals:    03/10/23 1255   BP: 134/80   Pulse: 79   Resp: 16   Temp: 97.9 °F (36.6 °C)   SpO2: 99%     Body mass index is 23.3 kg/m².  The following portions of the patient's history were reviewed and updated as appropriate: allergies, current medications, past family history, past medical history, past social history, past surgical history and problem list.    Past Medical History:   Diagnosis Date   • Acute superficial gastritis without hemorrhage 2/7/2018   • Anxiety    • Asthma    • Deliberate self-cutting    • Depression    • Environmental allergies    • Gestational hypertension, antepartum 2/19/2020   • Migraine    • Migraine without aura and without status migrainosus, not intractable 2/7/2018    Maxalt, Imitrex   • Recurrent major depressive disorder, in full remission (HCC) 2/7/2018    Celexa [id number]   • Substance abuse (HCC)     2017 Adderall, Xanax, oxycotin      Allergies   Allergen Reactions   • Iodine Itching   • Latex Itching      Social History     Socioeconomic History   • Marital status: Single     Spouse name: isreal   Tobacco Use   • Smoking status: Every Day     Packs/day: 0.50     Years: 5.00     Pack years: 2.50     Types: Cigarettes     Last attempt to quit: 6/15/2019     Years since quitting: 3.7   • Smokeless tobacco: Never   Vaping Use   • Vaping Use: Every day   • Substances: Nicotine   Substance and Sexual Activity   • Alcohol use: No   • Drug use: No     Types: Marijuana, Hydrocodone, Benzodiazepines, Amphetamines     Comment: quit MJ 3 weeks ago, other 2017    • Sexual activity: Yes     Partners: Male     Birth control/protection: Implant        Current Outpatient Medications:   •  albuterol (ACCUNEB) 1.25 MG/3ML nebulizer solution, Take 3 mL by nebulization Every 6 (Six) Hours As Needed for Wheezing or Shortness of Air., Disp: 90 each, Rfl:  12  •  albuterol sulfate  (90 Base) MCG/ACT inhaler, Inhale 2 puffs Every 6 (Six) Hours As Needed for Wheezing or Shortness of Air., Disp: 18 g, Rfl: 11  •  buPROPion (WELLBUTRIN) 75 MG tablet, Take 1 tablet by mouth 2 (Two) Times a Day., Disp: 180 tablet, Rfl: 1  •  citalopram (CeleXA) 40 MG tablet, TAKE 1 TABLET BY MOUTH EVERY DAY, Disp: 90 tablet, Rfl: 1  •  famotidine (PEPCID) 20 MG tablet, TAKE 1 TABLET BY MOUTH TWICE A DAY, Disp: 180 tablet, Rfl: 1  •  Fluticasone-Salmeterol (Advair Diskus) 250-50 MCG/ACT DISKUS, Inhale 1 puff 2 (Two) Times a Day for 90 days., Disp: 180 each, Rfl: 1  •  levalbuterol (XOPENEX HFA) 45 MCG/ACT inhaler, Inhale 2 puffs Every 4 (Four) Hours As Needed for Wheezing., Disp: 15 g, Rfl: 11  •  levonorgestrel (Kyleena) 19.5 MG intrauterine device IUD, 1 each by Intrauterine route 1 (One) Time., Disp: , Rfl:   •  multivitamin (THERAGRAN) tablet tablet, Take  by mouth Daily., Disp: , Rfl:   •  ondansetron (Zofran) 4 MG tablet, Take 1 tablet by mouth Every 12 (Twelve) Hours As Needed for Nausea or Vomiting., Disp: 10 tablet, Rfl: 0  •  pantoprazole (PROTONIX) 40 MG EC tablet, TAKE 1 TABLET BY MOUTH EVERY DAY, Disp: 90 tablet, Rfl: 1  •  SUMAtriptan (Imitrex) 25 MG tablet, Take one tablet at onset of headache. May repeat dose one time in 2 hours if headache not relieved., Disp: 9 tablet, Rfl: 3  •  topiramate (Topamax) 25 MG tablet, Take 1 tablet by mouth Every Night. For 2 weeks, then increase to two tablets nightly, Disp: 180 tablet, Rfl: 1  •  traZODone (DESYREL) 50 MG tablet, Take 1 tablet by mouth Every Night., Disp: 90 tablet, Rfl: 0     Objective     History of Present Illness  Desirae is a 22 year old female patient who is here for an acute visit. She c/o LLQ abdominal pain that started suddenly yesterday after intercourse. She states she has had ovarian cysts before and it feels different than that pain. She has an US scheduled with Gyn next week   Abdominal Pain  This is a new  problem. The current episode started yesterday. The onset quality is sudden. The problem has been unchanged. The pain is located in the LLQ. The pain is at a severity of 7/10. The quality of the pain is aching. The abdominal pain does not radiate. Associated symptoms include constipation. Pertinent negatives include no anorexia, arthralgias, belching, diarrhea, dysuria, fever, frequency, hematuria, melena, nausea or vomiting. Nothing aggravates the pain. The pain is relieved by nothing. Treatments tried: laxitives  The treatment provided no relief.        Review of Systems   Constitutional: Negative for fever.   Gastrointestinal: Positive for abdominal pain and constipation. Negative for anorexia, diarrhea, melena, nausea and vomiting.   Genitourinary: Negative for dysuria, frequency and hematuria.   Musculoskeletal: Negative for arthralgias.       Physical Exam  Vitals and nursing note reviewed.   Constitutional:       Appearance: Normal appearance. She is well-developed and well-groomed.   Cardiovascular:      Rate and Rhythm: Normal rate and regular rhythm.   Pulmonary:      Effort: Pulmonary effort is normal.      Breath sounds: Normal breath sounds.   Abdominal:      General: Bowel sounds are decreased.      Palpations: Abdomen is soft.      Tenderness: There is abdominal tenderness in the left lower quadrant.   Neurological:      Mental Status: She is alert.       Brief Urine Lab Results  (Last result in the past 365 days)      Color   Clarity   Blood   Leuk Est   Nitrite   Protein   CREAT   Urine HCG        03/10/23 1330 Yellow   Clear   Negative   Trace   Negative   Negative               .cbc  ASSESSMENT     Problems Addressed this Visit    None  Visit Diagnoses     Lower abdominal pain    -  Primary    Relevant Orders    POCT urinalysis dipstick, automated (Completed)    CT Abdomen Pelvis Without Contrast    POCT CBC (Completed)    Constipation, unspecified constipation type          Diagnoses       Codes  Comments    Lower abdominal pain    -  Primary ICD-10-CM: R10.30  ICD-9-CM: 789.09     Constipation, unspecified constipation type     ICD-10-CM: K59.00  ICD-9-CM: 564.00           PLAN    In office cbc shows normal wbc   In office UA showed trace leukocytes otherwise negative   Will get a stat CT of the abdomen and pelvis to r/o diverticulitis

## 2023-03-10 NOTE — TELEPHONE ENCOUNTER
Spoke with radiology. Prelim findings on CT abdomen  Slightly enlarged left ovary, small follicular cysts   No free fluid  IUD in uterus  Discussed results with patient  Recommend she follow up with her gyn for pelvic US, she is scheduled for one on 3/14/23

## 2023-03-10 NOTE — NURSING NOTE
"AMANDA Hernandez called and requested to speak with pt.  Pt ambulated to phone, spoke with APRN, and, after phone call was complete, pt stated she has uterine cysts and is free to go, according to AMANDA Guillermo.  Pt and family member directed toward \"Entrance A\" where they ambulated out with no c/o.  "

## 2023-03-14 ENCOUNTER — OFFICE VISIT (OUTPATIENT)
Dept: OBSTETRICS AND GYNECOLOGY | Age: 23
End: 2023-03-14
Payer: COMMERCIAL

## 2023-03-14 VITALS
BODY MASS INDEX: 22.82 KG/M2 | WEIGHT: 137 LBS | DIASTOLIC BLOOD PRESSURE: 74 MMHG | SYSTOLIC BLOOD PRESSURE: 110 MMHG | HEIGHT: 65 IN

## 2023-03-14 DIAGNOSIS — Z30.432 ENCOUNTER FOR IUD REMOVAL: ICD-10-CM

## 2023-03-14 DIAGNOSIS — R10.2 PELVIC PAIN: ICD-10-CM

## 2023-03-14 DIAGNOSIS — Z97.5 IUD (INTRAUTERINE DEVICE) IN PLACE: Primary | ICD-10-CM

## 2023-03-14 PROCEDURE — 58301 REMOVE INTRAUTERINE DEVICE: CPT | Performed by: OBSTETRICS & GYNECOLOGY

## 2023-03-14 PROCEDURE — 76830 TRANSVAGINAL US NON-OB: CPT | Performed by: OBSTETRICS & GYNECOLOGY

## 2023-03-14 RX ORDER — NORETHINDRONE ACETATE AND ETHINYL ESTRADIOL AND FERROUS FUMARATE 1MG-20(24)
1 KIT ORAL DAILY
Qty: 28 TABLET | Refills: 11 | Status: SHIPPED | OUTPATIENT
Start: 2023-03-14 | End: 2024-03-13

## 2023-03-14 RX ORDER — NORETHINDRONE ACETATE AND ETHINYL ESTRADIOL AND FERROUS FUMARATE 1MG-20(24)
1 KIT ORAL DAILY
Qty: 28 TABLET | Refills: 11 | Status: SHIPPED | OUTPATIENT
Start: 2023-03-14 | End: 2023-03-14 | Stop reason: SDUPTHER

## 2023-03-14 NOTE — PROGRESS NOTES
IUD Removal     No LMP recorded. Patient has had an implant.    Date of procedure:  3/14/2023    Risks and benefits discussed? yes  All questions answered? yes  Consents given by the patient  Written consent obtained? yes  Reason for removal: Would like to change back to oral contraceptive pills and desires pregnancy in about a year    Local anesthesia used:  no    Procedure documentation:    A speculum was placed in order to view the cervix.  A tenaculum did not need to be placed on the anterior cervical lip.  Cervical dilation did not need to be performed in order to access the string.  The IUD string was easily seen.  The string was grasped and the IUD was removed without difficulty.  The IUD did not appear to be adherent to the uterine cavity. It was removed intact.  IUD was discarded.  Patient tolerated well.        Patient had desired removal of her IUD because she would like to go back on oral contraceptive pills which she has been on in the past.  She also is considering a pregnancy.  Recently the patient had some left-sided pelvic pain and went to the emergency room.  The pain has pretty much gone away but she is having an ultrasound today.    Ultrasound shows a normal size uterus with IUD in proper position.  The left ovary is normal size but has a hemorrhagic area measuring about 2 cm and some free fluid in the pelvis consistent with possible recent hemorrhagic cyst.  The right ovary appears normal.      Assessment-IUD removed today.  Patient would like to start oral contraceptive pills which will be prescribed.  We discussed risk and benefits and encouraged the patient to stop vaping.

## 2023-03-30 ENCOUNTER — OFFICE VISIT (OUTPATIENT)
Dept: INTERNAL MEDICINE | Facility: CLINIC | Age: 23
End: 2023-03-30
Payer: COMMERCIAL

## 2023-03-30 VITALS
OXYGEN SATURATION: 98 % | SYSTOLIC BLOOD PRESSURE: 128 MMHG | BODY MASS INDEX: 22.99 KG/M2 | WEIGHT: 138 LBS | RESPIRATION RATE: 16 BRPM | DIASTOLIC BLOOD PRESSURE: 74 MMHG | HEART RATE: 86 BPM | TEMPERATURE: 96.4 F | HEIGHT: 65 IN

## 2023-03-30 DIAGNOSIS — J06.9 VIRAL UPPER RESPIRATORY TRACT INFECTION: ICD-10-CM

## 2023-03-30 DIAGNOSIS — J02.9 SORE THROAT: ICD-10-CM

## 2023-03-30 DIAGNOSIS — R05.1 ACUTE COUGH: ICD-10-CM

## 2023-03-30 DIAGNOSIS — A08.4 VIRAL GASTROENTERITIS: ICD-10-CM

## 2023-03-30 DIAGNOSIS — R50.9 FEVER, UNSPECIFIED FEVER CAUSE: Primary | ICD-10-CM

## 2023-03-30 LAB
EXPIRATION DATE: NORMAL
EXPIRATION DATE: NORMAL
FLUAV AG UPPER RESP QL IA.RAPID: NOT DETECTED
FLUBV AG UPPER RESP QL IA.RAPID: NOT DETECTED
INTERNAL CONTROL: NORMAL
INTERNAL CONTROL: NORMAL
Lab: NORMAL
Lab: NORMAL
S PYO AG THROAT QL: NEGATIVE
SARS-COV-2 AG UPPER RESP QL IA.RAPID: NOT DETECTED

## 2023-03-30 PROCEDURE — 87428 SARSCOV & INF VIR A&B AG IA: CPT | Performed by: NURSE PRACTITIONER

## 2023-03-30 PROCEDURE — 87880 STREP A ASSAY W/OPTIC: CPT | Performed by: NURSE PRACTITIONER

## 2023-03-30 PROCEDURE — 99213 OFFICE O/P EST LOW 20 MIN: CPT | Performed by: NURSE PRACTITIONER

## 2023-03-30 RX ORDER — ONDANSETRON 4 MG/1
TABLET, FILM COATED ORAL
Qty: 10 TABLET | Refills: 0 | Status: SHIPPED | OUTPATIENT
Start: 2023-03-30

## 2023-03-30 RX ORDER — BROMPHENIRAMINE MALEATE, PSEUDOEPHEDRINE HYDROCHLORIDE, AND DEXTROMETHORPHAN HYDROBROMIDE 2; 30; 10 MG/5ML; MG/5ML; MG/5ML
5 SYRUP ORAL 4 TIMES DAILY PRN
Qty: 180 ML | Refills: 1 | Status: SHIPPED | OUTPATIENT
Start: 2023-03-30

## 2023-03-30 NOTE — PROGRESS NOTES
Subjective   Desirae Pascual is a 22 y.o. female.   Chief Complaint   Patient presents with   • Cough   • Nasal Congestion   • Vomiting     Vitals:    03/30/23 1101   BP: 128/74   Pulse: 86   Resp: 16   Temp: 96.4 °F (35.8 °C)   SpO2: 98%     Patient's last menstrual period was 03/12/2023.    History of Present Illness  Desirae is a 22 year old female patient who is here for an acute visit. She c/o cough and congestion that started 5 days ago. She had a fever with a tmax of 102 but that is resolved. She has been vomiting        The following portions of the patient's history were reviewed and updated as appropriate: allergies, current medications, past family history, past medical history, past social history, past surgical history and problem list.    Review of Systems   Constitutional: Positive for fatigue and fever.   HENT: Positive for congestion and sore throat.    Respiratory: Positive for cough. Negative for shortness of breath and wheezing.    Cardiovascular: Negative.    Gastrointestinal: Positive for nausea and vomiting. Negative for diarrhea.   Skin: Positive for rash.       Objective   Physical Exam  Vitals reviewed.   Constitutional:       General: She is not in acute distress.     Appearance: She is well-developed and well-groomed. She is ill-appearing.   HENT:      Head: Normocephalic.      Right Ear: Ear canal normal. A middle ear effusion is present.      Left Ear: Ear canal normal. A middle ear effusion is present.      Nose: Rhinorrhea present. Rhinorrhea is clear.      Mouth/Throat:      Pharynx: Posterior oropharyngeal erythema present. No oropharyngeal exudate.   Cardiovascular:      Rate and Rhythm: Normal rate and regular rhythm.   Pulmonary:      Effort: Pulmonary effort is normal.      Breath sounds: Normal breath sounds.   Skin:     Findings: Petechiae and rash present.      Comments: Around eyes bilaterally    Neurological:      Mental Status: She is alert and oriented to person, place, and  time.   Psychiatric:         Mood and Affect: Mood normal.         Assessment & Plan   Diagnoses and all orders for this visit:    1. Fever, unspecified fever cause (Primary)  -     POCT SARS-CoV-2 Antigen LOREN + Flu  -     POC Rapid Strep A    2. Acute cough    3. Sore throat    4. Viral upper respiratory tract infection    Other orders  -     brompheniramine-pseudoephedrine-DM 30-2-10 MG/5ML syrup; Take 5 mL by mouth 4 (Four) Times a Day As Needed for Congestion or Cough.  Dispense: 180 mL; Refill: 1      Covid, flu and strep are negative   Petechial rash around the eyes and under the eyes is likely from coughing and vomiting  Symptom treatment for 7-10 days  Rest and fluids  Tylenol or motrin   Avoid second hand smoke and allergens   Throat lozenges, humidifier, vicks vapor rub as needed  Follow up if your symptoms persist past 7-10 days or sooner if your symptoms worsen or if you develop new symptoms

## 2023-08-09 ENCOUNTER — TELEPHONE (OUTPATIENT)
Dept: INTERNAL MEDICINE | Facility: CLINIC | Age: 23
End: 2023-08-09
Payer: COMMERCIAL

## 2023-08-09 RX ORDER — LEVALBUTEROL TARTRATE 45 UG/1
2 AEROSOL, METERED ORAL EVERY 4 HOURS PRN
Qty: 15 G | Refills: 11 | Status: SHIPPED | OUTPATIENT
Start: 2023-08-09

## 2023-10-09 ENCOUNTER — TELEPHONE (OUTPATIENT)
Dept: INTERNAL MEDICINE | Facility: CLINIC | Age: 23
End: 2023-10-09

## 2023-10-09 RX ORDER — TOPIRAMATE 25 MG/1
25 TABLET ORAL NIGHTLY
Qty: 180 TABLET | Refills: 1 | Status: SHIPPED | OUTPATIENT
Start: 2023-10-09

## 2023-10-09 RX ORDER — CITALOPRAM 40 MG/1
40 TABLET ORAL DAILY
Qty: 90 TABLET | Refills: 1 | Status: SHIPPED | OUTPATIENT
Start: 2023-10-09

## 2024-01-02 DIAGNOSIS — A08.4 VIRAL GASTROENTERITIS: ICD-10-CM

## 2024-02-12 NOTE — TELEPHONE ENCOUNTER
Caller: LIZZ RIVERS    Relationship to patient: SELF    Best call back number:   [phone] -PLEASE SEND MESSAGE ON MY CHART    Patient is needing:     PT WANTED TO UPDATE PROVIDER ABOUT HER BIOPSY AND MAMMO-SHE SAID ALL WAS WELL AND SHE IS SCHEDULED FOR A YEAR OUT    ALSO-SHE IS HAVING HER KYLEENA REMOVED IN MAY AND WANTED TO ASK IF SHE WOULD BE ABLE TO GO BACK TO WORK AFTERWARDS           Schizophrenia, unspecified type

## 2024-04-02 RX ORDER — CITALOPRAM 40 MG/1
40 TABLET ORAL DAILY
Qty: 90 TABLET | Refills: 1 | Status: SHIPPED | OUTPATIENT
Start: 2024-04-02

## 2024-04-02 RX ORDER — ALBUTEROL SULFATE 90 UG/1
2 AEROSOL, METERED RESPIRATORY (INHALATION) EVERY 6 HOURS PRN
Qty: 18 G | Refills: 11 | Status: SHIPPED | OUTPATIENT
Start: 2024-04-02

## 2024-04-02 RX ORDER — BUPROPION HYDROCHLORIDE 75 MG/1
75 TABLET ORAL 2 TIMES DAILY
Qty: 180 TABLET | Refills: 1 | Status: SHIPPED | OUTPATIENT
Start: 2024-04-02

## 2024-04-03 RX ORDER — TOPIRAMATE 25 MG/1
25 TABLET ORAL NIGHTLY
Qty: 180 TABLET | Refills: 1 | Status: SHIPPED | OUTPATIENT
Start: 2024-04-03

## 2024-05-13 ENCOUNTER — TELEPHONE (OUTPATIENT)
Dept: INTERNAL MEDICINE | Facility: CLINIC | Age: 24
End: 2024-05-13
Payer: COMMERCIAL

## 2024-05-13 RX ORDER — LEVALBUTEROL TARTRATE 45 UG/1
2 AEROSOL, METERED ORAL EVERY 4 HOURS PRN
Qty: 15 G | Refills: 11 | OUTPATIENT
Start: 2024-05-13

## 2024-05-13 RX ORDER — FLUTICASONE PROPIONATE AND SALMETEROL 250; 50 UG/1; UG/1
1 POWDER RESPIRATORY (INHALATION) 2 TIMES DAILY
Qty: 180 EACH | Refills: 1 | OUTPATIENT
Start: 2024-05-13 | End: 2024-08-11

## 2024-05-13 NOTE — TELEPHONE ENCOUNTER
Received message from patient's mom that she has been off of topamax , citalopram and bupropion due to insurance and pt needs medication refilled  She was last seen in march 2023.   Advised her mom it is ok to refill for 30 days but she needs to schedule an appt  Recommend that she taper on topamax, bupropion and celexa slowly and instructions given   She verbalized understanding and reports she will schedule an appt for her

## 2024-05-13 NOTE — TELEPHONE ENCOUNTER
Received refill request from patient and denied due to patient not being seen since march 2023  MA refilled four of her medications for 6 months instead of 30 days as advised and patient never made a follow up appt  I called [facility] and cancelled prescriptions

## 2024-05-14 ENCOUNTER — CLINICAL SUPPORT (OUTPATIENT)
Dept: FAMILY MEDICINE CLINIC | Facility: CLINIC | Age: 24
End: 2024-05-14
Payer: COMMERCIAL

## 2024-05-14 DIAGNOSIS — Z23 NEED FOR VACCINATION: Primary | ICD-10-CM

## 2024-05-14 PROCEDURE — 91320 SARSCV2 VAC 30MCG TRS-SUC IM: CPT | Performed by: NURSE PRACTITIONER

## 2024-05-14 PROCEDURE — 90480 ADMN SARSCOV2 VAC 1/ONLY CMP: CPT | Performed by: NURSE PRACTITIONER

## 2024-05-22 ENCOUNTER — OFFICE VISIT (OUTPATIENT)
Dept: FAMILY MEDICINE CLINIC | Facility: CLINIC | Age: 24
End: 2024-05-22
Payer: COMMERCIAL

## 2024-05-22 VITALS
OXYGEN SATURATION: 98 % | HEART RATE: 81 BPM | WEIGHT: 154 LBS | SYSTOLIC BLOOD PRESSURE: 124 MMHG | RESPIRATION RATE: 18 BRPM | DIASTOLIC BLOOD PRESSURE: 78 MMHG | HEIGHT: 65 IN | BODY MASS INDEX: 25.66 KG/M2

## 2024-05-22 DIAGNOSIS — F51.01 PRIMARY INSOMNIA: ICD-10-CM

## 2024-05-22 DIAGNOSIS — R09.81 SINUS CONGESTION: ICD-10-CM

## 2024-05-22 DIAGNOSIS — Z11.59 ENCOUNTER FOR HEPATITIS C SCREENING TEST FOR LOW RISK PATIENT: ICD-10-CM

## 2024-05-22 DIAGNOSIS — J45.20 MILD INTERMITTENT ASTHMA, UNSPECIFIED WHETHER COMPLICATED: ICD-10-CM

## 2024-05-22 DIAGNOSIS — F32.A ANXIETY AND DEPRESSION: Primary | ICD-10-CM

## 2024-05-22 DIAGNOSIS — Z13.220 SCREENING, LIPID: ICD-10-CM

## 2024-05-22 DIAGNOSIS — L30.9 ECZEMA OF LOWER EXTREMITY: ICD-10-CM

## 2024-05-22 DIAGNOSIS — N92.0 MENORRHAGIA WITH REGULAR CYCLE: ICD-10-CM

## 2024-05-22 DIAGNOSIS — R43.0 LOSS OF SMELL: ICD-10-CM

## 2024-05-22 DIAGNOSIS — R00.2 PALPITATIONS: ICD-10-CM

## 2024-05-22 DIAGNOSIS — F41.9 ANXIETY AND DEPRESSION: Primary | ICD-10-CM

## 2024-05-22 RX ORDER — HYDROXYZINE HYDROCHLORIDE 25 MG/1
25-50 TABLET, FILM COATED ORAL AS NEEDED
Qty: 90 TABLET | Refills: 0 | Status: SHIPPED | OUTPATIENT
Start: 2024-05-22

## 2024-05-22 RX ORDER — FLUTICASONE PROPIONATE AND SALMETEROL 250; 50 UG/1; UG/1
1 POWDER RESPIRATORY (INHALATION) 2 TIMES DAILY
Qty: 180 EACH | Refills: 1 | Status: SHIPPED | OUTPATIENT
Start: 2024-05-22 | End: 2024-08-21

## 2024-05-22 RX ORDER — ALBUTEROL SULFATE 90 UG/1
2 AEROSOL, METERED RESPIRATORY (INHALATION) EVERY 4 HOURS PRN
Qty: 8.5 G | Refills: 0 | Status: SHIPPED | OUTPATIENT
Start: 2024-05-22

## 2024-05-22 RX ORDER — RIMEGEPANT SULFATE 75 MG/75MG
75 TABLET, ORALLY DISINTEGRATING ORAL AS NEEDED
Qty: 16 TABLET | Refills: 3 | Status: SHIPPED | OUTPATIENT
Start: 2024-05-22

## 2024-05-22 RX ORDER — BUPROPION HYDROCHLORIDE 75 MG/1
75 TABLET ORAL 2 TIMES DAILY
Start: 2024-05-22

## 2024-05-22 RX ORDER — NORETHINDRONE ACETATE AND ETHINYL ESTRADIOL 1MG-20(21)
1 KIT ORAL DAILY
Qty: 28 TABLET | Refills: 12 | Status: SHIPPED | OUTPATIENT
Start: 2024-05-22 | End: 2025-05-22

## 2024-05-22 RX ORDER — CRISABOROLE 20 MG/G
1 OINTMENT TOPICAL AS NEEDED
Qty: 60 G | Refills: 0 | Status: SHIPPED | OUTPATIENT
Start: 2024-05-22

## 2024-05-22 RX ORDER — CITALOPRAM 20 MG/1
20 TABLET ORAL DAILY
Start: 2024-05-22
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
## 2024-05-23 LAB
25(OH)D3+25(OH)D2 SERPL-MCNC: 33 NG/ML (ref 30–100)
ALBUMIN SERPL-MCNC: 5.2 G/DL (ref 3.5–5.2)
ALBUMIN/GLOB SERPL: 2 G/DL
ALP SERPL-CCNC: 52 U/L (ref 39–117)
ALT SERPL-CCNC: 20 U/L (ref 1–33)
AST SERPL-CCNC: 21 U/L (ref 1–32)
BASOPHILS # BLD AUTO: 0.03 10*3/MM3 (ref 0–0.2)
BASOPHILS NFR BLD AUTO: 0.5 % (ref 0–1.5)
BILIRUB SERPL-MCNC: 0.3 MG/DL (ref 0–1.2)
BUN SERPL-MCNC: 16 MG/DL (ref 6–20)
BUN/CREAT SERPL: 20.8 (ref 7–25)
CALCIUM SERPL-MCNC: 9.8 MG/DL (ref 8.6–10.5)
CHLORIDE SERPL-SCNC: 102 MMOL/L (ref 98–107)
CHOLEST SERPL-MCNC: 162 MG/DL (ref 0–200)
CHOLEST/HDLC SERPL: 3.52 {RATIO}
CO2 SERPL-SCNC: 25.1 MMOL/L (ref 22–29)
CREAT SERPL-MCNC: 0.77 MG/DL (ref 0.57–1)
EGFRCR SERPLBLD CKD-EPI 2021: 109.9 ML/MIN/1.73
EOSINOPHIL # BLD AUTO: 0.25 10*3/MM3 (ref 0–0.4)
EOSINOPHIL NFR BLD AUTO: 4.5 % (ref 0.3–6.2)
ERYTHROCYTE [DISTWIDTH] IN BLOOD BY AUTOMATED COUNT: 11.8 % (ref 12.3–15.4)
FERRITIN SERPL-MCNC: 20.5 NG/ML (ref 13–150)
GLOBULIN SER CALC-MCNC: 2.6 GM/DL
GLUCOSE SERPL-MCNC: 89 MG/DL (ref 65–99)
HCT VFR BLD AUTO: 41 % (ref 34–46.6)
HCV IGG SERPL QL IA: NON REACTIVE
HDLC SERPL-MCNC: 46 MG/DL (ref 40–60)
HGB BLD-MCNC: 13.7 G/DL (ref 12–15.9)
IMM GRANULOCYTES # BLD AUTO: 0.01 10*3/MM3 (ref 0–0.05)
IMM GRANULOCYTES NFR BLD AUTO: 0.2 % (ref 0–0.5)
IRON SATN MFR SERPL: 13 % (ref 20–50)
IRON SERPL-MCNC: 72 MCG/DL (ref 37–145)
LDLC SERPL CALC-MCNC: 105 MG/DL (ref 0–100)
LYMPHOCYTES # BLD AUTO: 1.49 10*3/MM3 (ref 0.7–3.1)
LYMPHOCYTES NFR BLD AUTO: 26.6 % (ref 19.6–45.3)
MCH RBC QN AUTO: 28 PG (ref 26.6–33)
MCHC RBC AUTO-ENTMCNC: 33.4 G/DL (ref 31.5–35.7)
MCV RBC AUTO: 83.7 FL (ref 79–97)
MONOCYTES # BLD AUTO: 0.37 10*3/MM3 (ref 0.1–0.9)
MONOCYTES NFR BLD AUTO: 6.6 % (ref 5–12)
NEUTROPHILS # BLD AUTO: 3.45 10*3/MM3 (ref 1.7–7)
NEUTROPHILS NFR BLD AUTO: 61.6 % (ref 42.7–76)
NRBC BLD AUTO-RTO: 0 /100 WBC (ref 0–0.2)
PLATELET # BLD AUTO: 214 10*3/MM3 (ref 140–450)
POTASSIUM SERPL-SCNC: 3.8 MMOL/L (ref 3.5–5.2)
PROT SERPL-MCNC: 7.8 G/DL (ref 6–8.5)
RBC # BLD AUTO: 4.9 10*6/MM3 (ref 3.77–5.28)
SODIUM SERPL-SCNC: 138 MMOL/L (ref 136–145)
T4 FREE SERPL-MCNC: 1.15 NG/DL (ref 0.93–1.7)
THYROGLOB AB SERPL-ACNC: <1 IU/ML (ref 0–0.9)
THYROPEROXIDASE AB SERPL-ACNC: 11 IU/ML (ref 0–34)
TIBC SERPL-MCNC: 550 MCG/DL
TRIGL SERPL-MCNC: 55 MG/DL (ref 0–150)
TSH SERPL DL<=0.005 MIU/L-ACNC: 0.84 UIU/ML (ref 0.27–4.2)
UIBC SERPL-MCNC: 478 MCG/DL (ref 112–346)
VLDLC SERPL CALC-MCNC: 11 MG/DL (ref 5–40)
WBC # BLD AUTO: 5.6 10*3/MM3 (ref 3.4–10.8)

## 2024-05-24 ENCOUNTER — PATIENT ROUNDING (BHMG ONLY) (OUTPATIENT)
Dept: FAMILY MEDICINE CLINIC | Facility: CLINIC | Age: 24
End: 2024-05-24
Payer: COMMERCIAL

## 2024-05-24 NOTE — PROGRESS NOTES
A [employer] message has been sent to the patient for PATIENT ROUNDING with [facility] [place].
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
## 2024-06-10 ENCOUNTER — TELEMEDICINE (OUTPATIENT)
Dept: BEHAVIORAL HEALTH | Facility: CLINIC | Age: 24
End: 2024-06-10
Payer: COMMERCIAL

## 2024-06-10 VITALS — WEIGHT: 154 LBS | BODY MASS INDEX: 25.66 KG/M2 | HEIGHT: 65 IN

## 2024-06-10 DIAGNOSIS — F41.1 GAD (GENERALIZED ANXIETY DISORDER): Primary | ICD-10-CM

## 2024-06-10 PROCEDURE — 90792 PSYCH DIAG EVAL W/MED SRVCS: CPT

## 2024-06-10 RX ORDER — BUPROPION HYDROCHLORIDE 150 MG/1
150 TABLET ORAL EVERY MORNING
Qty: 42 TABLET | Refills: 0 | Status: SHIPPED | OUTPATIENT
Start: 2024-06-10

## 2024-06-10 RX ORDER — CITALOPRAM 40 MG/1
40 TABLET ORAL EVERY MORNING
Qty: 42 TABLET | Refills: 0
Start: 2024-06-10

## 2024-06-10 NOTE — PATIENT INSTRUCTIONS
Medication changes:   Celexa/citalopram, increase to 40 mg a day  Wellbutrin/bupropion, changed to  mg take in the morning  Patient educated they must be on psychiatric medications consistently for at least 4 to 6 weeks to get maximum benefit.    Therapy recommendation:   Psychotherapy: Referral placed today to [facility], patient should receive a phone call or [employer] message within 1 week about scheduling/appointment, if you have not heard anything within that timeframe please call us back at [phone] to inquire, patient advised that a wait list is common.  Patients are normally seen weekly, biweekly, or monthly starting out, 45-60 min sessions are common.  In order for counseling to be beneficial consistency is key.     GENERAL NEW PATIENT INSTRUCTIONS:    -Please arrive in person or virtually 10-15 minutes prior to appointment to allow for registration/sign in/questionnaires. If you are seen virtually please review after visit summary (AVS)/patient instructions via [employer] for a summary of plan of care/changes in treatment plan. If you are having difficulties logging on or accessing [employer] please contact my office for assistance.    -The best way to get a hold of Orestes is to call the office at [phone] and ask to leave a message with his medical assistant. Orestes Negrete is a Psychiatric Mental Health Nurse Practitioner, due to his specialty patient's are not able to message him directly via [employer]. Please give my office up to 48 hours to respond to a patient call/question/refill request. Refill requests will be made during normal office hours only, Monday-Friday 8:00-5:30.      -Orestes is out of the office on Fridays and weekends. Tuesdays and Thursdays are Orestes's in-office days, Mondays and Wednesdays are his telehealth days.  The decision to be seen virtually or in person is up to the discretion of the provider, not all behavioral health problems are appropriate for  telehealth.    -Please call the office at [phone] with any worsening of symptoms or onset of intolerable side effects.  -Follow-up appointments must be maintained in order for prescribing to continue.  -Patient has been educated regarding multimodal approach with healthy nutrition, healthy sleep, regular physical activity, social activities, counseling, and medications.   -Please call 911 or go to the nearest ER if you begin to have thoughts of harming yourself or other people.    No show policy:  We understand unexpected circumstances arise; however, anytime you miss your appointment we are unable to provide you appropriate care.  In addition, each appointment missed could have been used to provide care for others.  We ask that you call at least 24 hours in advance to cancel or reschedule an appointment. We would like to take this opportunity to remind you of our policy stating patients who miss THREE or more appointments without cancelling or rescheduling 24 hours in advance of the appointment may be subject to cancellation of any further visits with our clinic and recommendation to seek in-person services/visits. Please call [phone] to reschedule your appointment. If there are reasons that make it difficult for you to keep the appointments, please call and let us know how we can help. Please understand that medication prescribing will not continue without seeing your provider.

## 2024-06-11 RX ORDER — LEVALBUTEROL TARTRATE 45 UG/1
2 AEROSOL, METERED ORAL EVERY 4 HOURS PRN
Qty: 15 G | Refills: 11 | OUTPATIENT
Start: 2024-06-11

## 2024-06-13 RX ORDER — LEVALBUTEROL TARTRATE 45 UG/1
2 AEROSOL, METERED ORAL EVERY 4 HOURS PRN
Qty: 15 G | Refills: 11 | OUTPATIENT
Start: 2024-06-13

## 2024-06-13 RX ORDER — ALBUTEROL SULFATE 90 UG/1
2 AEROSOL, METERED RESPIRATORY (INHALATION) EVERY 4 HOURS PRN
Qty: 8.5 G | Refills: 0 | Status: SHIPPED | OUTPATIENT
Start: 2024-06-13

## 2024-06-19 ENCOUNTER — OFFICE VISIT (OUTPATIENT)
Dept: OBSTETRICS AND GYNECOLOGY | Age: 24
End: 2024-06-19
Payer: COMMERCIAL

## 2024-06-19 VITALS
HEIGHT: 65 IN | SYSTOLIC BLOOD PRESSURE: 122 MMHG | DIASTOLIC BLOOD PRESSURE: 72 MMHG | BODY MASS INDEX: 26.66 KG/M2 | WEIGHT: 160 LBS

## 2024-06-19 DIAGNOSIS — N64.4 BREAST PAIN, LEFT: ICD-10-CM

## 2024-06-19 DIAGNOSIS — Z80.3 FAMILY HISTORY OF BREAST CANCER: ICD-10-CM

## 2024-06-19 DIAGNOSIS — L02.224 BOIL OF GROIN: Primary | ICD-10-CM

## 2024-06-19 DIAGNOSIS — Z91.89 AT HIGH RISK FOR BREAST CANCER: ICD-10-CM

## 2024-06-19 PROCEDURE — 99214 OFFICE O/P EST MOD 30 MIN: CPT | Performed by: NURSE PRACTITIONER

## 2024-06-19 RX ORDER — CEPHALEXIN 500 MG/1
500 CAPSULE ORAL 2 TIMES DAILY
Qty: 14 CAPSULE | Refills: 0 | Status: SHIPPED | OUTPATIENT
Start: 2024-06-19 | End: 2024-06-27

## 2024-06-19 NOTE — PROGRESS NOTES
"Subjective     Chief Complaint   Patient presents with    Breast Problem     Breast tenderness,left, boil on vagina outside       Desirae Pascual is a 23 y.o.  whose LMP is Patient's last menstrual period was 2024.     Pt presents today with two different concerns    She is noting left breast tenderness  She does have significant family hx of breast cancer  Her mother had breast cancer at age 38  Pt had negative genetic testing  She has seen breast surgery due to significant increase in lifetime risk of breast cancer  Lost insurance so has not followed up with them but recommendation was for MRI at 26 and mammograms at 28  She had a breast biopsy in   She has had tenderness in this area since then but has worsened over the last 4 months  She notices it all the time but more severe around the time of menses    She also has c/o boil to outside of vagina  She states it started draining two days ago  She had this issue before when wearing thongs  She recently started wearing thongs again    No Additional Complaints Reported    The following portions of the patient's history were reviewed and updated as appropriate:vital signs, allergies, current medications, past medical history, past social history, past surgical history, and problem list      Review of Systems   Pertinent items are noted in HPI.     Objective      /72   Ht 165.1 cm (65\")   Wt 72.6 kg (160 lb)   LMP 2024   BMI 26.63 kg/m²     Physical Exam  Genitourinary:         Comments: Resolving boil. Small amount of purulent drainage noted with expressing.        General:   alert and no distress   Heart: Not performed today   Lungs: Not performed today.   Breast: normal appearance, no masses or tenderness, Inspection negative, No nipple retraction or dimpling, No nipple discharge or bleeding, No axillary or supraclavicular adenopathy, Normal to palpation without dominant masses   Neck: na   Abdomen: {Not performed today   CVA: Not " performed today   Pelvis: External genitalia: see pic   Extremities: Not performed today   Neurologic: negative   Psychiatric: Normal affect, judgement, and mood       Lab Review   Labs: No data reviewed     Imaging   No data reviewed    Assessment & Plan     ASSESSMENT  1. Boil of groin    2. Breast pain, left    3. Family history of breast cancer    4. At high risk for breast cancer        PLAN  1.   Orders Placed This Encounter   Procedures    US breast left limited    Ambulatory Referral to Breast Surgery       2. Medications prescribed this encounter:        New Medications Ordered This Visit   Medications    cephalexin (Keflex) 500 MG capsule     Sig: Take 1 capsule by mouth 2 (Two) Times a Day for 7 days.     Dispense:  14 capsule     Refill:  0       3. Breast US ordered. Recommend follow up with breast surgery since pain has been ongoing since biopsy in 2022.     Keflex bid for boil      Follow up: PRN and annual exam    AMANDA Mary  6/19/2024

## 2024-06-20 ENCOUNTER — TELEPHONE (OUTPATIENT)
Dept: SURGERY | Facility: CLINIC | Age: 24
End: 2024-06-20
Payer: COMMERCIAL

## 2024-06-21 ENCOUNTER — TELEPHONE (OUTPATIENT)
Dept: SURGERY | Facility: CLINIC | Age: 24
End: 2024-06-21
Payer: COMMERCIAL

## 2024-06-21 NOTE — TELEPHONE ENCOUNTER
Spoke to pt and got her scheduled for a nw pt appt with lizeth gupta for left breast pain, high risk, family hx of breast cancer on 07/05 @ 10am   Pt has us scheduled at [facility] on 06/26 so I scheduled the new pt appt for after that     Pt stated understanding  Mailed new pt packet  Pt aware to come in 20 min early if for some reason new pt packet doesn't get to her on time   Verified our address

## 2024-07-03 ENCOUNTER — TELEPHONE (OUTPATIENT)
Dept: SURGERY | Facility: CLINIC | Age: 24
End: 2024-07-03
Payer: COMMERCIAL

## 2024-07-03 NOTE — TELEPHONE ENCOUNTER
Umerm for pt to give me a call back so we can ling her new pt appt with lizeth gupta for high risk and family hx of breast cancer

## 2024-07-08 ENCOUNTER — TELEPHONE (OUTPATIENT)
Dept: SURGERY | Facility: CLINIC | Age: 24
End: 2024-07-08
Payer: COMMERCIAL

## 2024-07-08 DIAGNOSIS — B37.31 YEAST VAGINITIS: Primary | ICD-10-CM

## 2024-07-08 RX ORDER — FLUCONAZOLE 150 MG/1
150 TABLET ORAL DAILY
Qty: 1 TABLET | Refills: 0 | Status: SHIPPED | OUTPATIENT
Start: 2024-07-08 | End: 2024-07-11

## 2024-07-09 RX ORDER — TRIAMCINOLONE ACETONIDE 1 MG/G
1 CREAM TOPICAL 2 TIMES DAILY
Qty: 60 G | Refills: 2 | Status: SHIPPED | OUTPATIENT
Start: 2024-07-09

## 2024-07-11 DIAGNOSIS — B37.31 YEAST VAGINITIS: Primary | ICD-10-CM

## 2024-07-11 RX ORDER — FLUCONAZOLE 150 MG/1
150 TABLET ORAL DAILY
Qty: 1 TABLET | Refills: 0 | Status: SHIPPED | OUTPATIENT
Start: 2024-07-11

## 2024-07-15 ENCOUNTER — HOSPITAL ENCOUNTER (OUTPATIENT)
Dept: CARDIOLOGY | Facility: HOSPITAL | Age: 24
Discharge: HOME | End: 2024-07-15
Admitting: OBSTETRICS & GYNECOLOGY
Payer: COMMERCIAL

## 2024-07-15 DIAGNOSIS — M79.605 LOWER EXTREMITY PAIN, ANTERIOR, LEFT: ICD-10-CM

## 2024-07-15 DIAGNOSIS — M79.605 LOWER EXTREMITY PAIN, ANTERIOR, LEFT: Primary | ICD-10-CM

## 2024-07-15 LAB
BH CV LOWER VASCULAR LEFT COMMON FEMORAL AUGMENT: NORMAL
BH CV LOWER VASCULAR LEFT COMMON FEMORAL COMPETENT: NORMAL
BH CV LOWER VASCULAR LEFT COMMON FEMORAL COMPRESS: NORMAL
BH CV LOWER VASCULAR LEFT COMMON FEMORAL PHASIC: NORMAL
BH CV LOWER VASCULAR LEFT COMMON FEMORAL SPONT: NORMAL
BH CV LOWER VASCULAR LEFT DISTAL FEMORAL COMPRESS: NORMAL
BH CV LOWER VASCULAR LEFT GASTRONEMIUS COMPRESS: NORMAL
BH CV LOWER VASCULAR LEFT GREATER SAPH AK COMPRESS: NORMAL
BH CV LOWER VASCULAR LEFT GREATER SAPH BK COMPRESS: NORMAL
BH CV LOWER VASCULAR LEFT LESSER SAPH COMPRESS: NORMAL
BH CV LOWER VASCULAR LEFT MID FEMORAL AUGMENT: NORMAL
BH CV LOWER VASCULAR LEFT MID FEMORAL COMPETENT: NORMAL
BH CV LOWER VASCULAR LEFT MID FEMORAL COMPRESS: NORMAL
BH CV LOWER VASCULAR LEFT MID FEMORAL PHASIC: NORMAL
BH CV LOWER VASCULAR LEFT MID FEMORAL SPONT: NORMAL
BH CV LOWER VASCULAR LEFT PERONEAL COMPRESS: NORMAL
BH CV LOWER VASCULAR LEFT POPLITEAL AUGMENT: NORMAL
BH CV LOWER VASCULAR LEFT POPLITEAL COMPETENT: NORMAL
BH CV LOWER VASCULAR LEFT POPLITEAL COMPRESS: NORMAL
BH CV LOWER VASCULAR LEFT POPLITEAL PHASIC: NORMAL
BH CV LOWER VASCULAR LEFT POPLITEAL SPONT: NORMAL
BH CV LOWER VASCULAR LEFT POSTERIOR TIBIAL COMPRESS: NORMAL
BH CV LOWER VASCULAR LEFT PROFUNDA FEMORAL COMPRESS: NORMAL
BH CV LOWER VASCULAR LEFT PROXIMAL FEMORAL COMPRESS: NORMAL
BH CV LOWER VASCULAR LEFT SAPHENOFEMORAL JUNCTION COMPRESS: NORMAL
BH CV LOWER VASCULAR LEFT SOLEAL COMPRESS: NORMAL
BH CV LOWER VASCULAR RIGHT COMMON FEMORAL AUGMENT: NORMAL
BH CV LOWER VASCULAR RIGHT COMMON FEMORAL COMPETENT: NORMAL
BH CV LOWER VASCULAR RIGHT COMMON FEMORAL COMPRESS: NORMAL
BH CV LOWER VASCULAR RIGHT COMMON FEMORAL PHASIC: NORMAL
BH CV LOWER VASCULAR RIGHT COMMON FEMORAL SPONT: NORMAL
BH CV VAS PRELIMINARY FINDINGS SCRIPTING: 1

## 2024-07-15 PROCEDURE — 93971 EXTREMITY STUDY: CPT

## 2024-07-16 DIAGNOSIS — F41.1 GAD (GENERALIZED ANXIETY DISORDER): ICD-10-CM

## 2024-07-16 RX ORDER — LEVALBUTEROL TARTRATE 45 UG/1
2 AEROSOL, METERED ORAL EVERY 4 HOURS PRN
Qty: 15 G | Refills: 11 | OUTPATIENT
Start: 2024-07-16

## 2024-07-16 RX ORDER — BUPROPION HYDROCHLORIDE 150 MG/1
150 TABLET ORAL EVERY MORNING
Qty: 42 TABLET | Refills: 0 | Status: SHIPPED | OUTPATIENT
Start: 2024-07-16

## 2024-07-17 ENCOUNTER — TELEPHONE (OUTPATIENT)
Dept: SURGERY | Facility: CLINIC | Age: 24
End: 2024-07-17
Payer: COMMERCIAL

## 2024-07-17 RX ORDER — CITALOPRAM 40 MG/1
40 TABLET ORAL EVERY MORNING
Qty: 30 TABLET | Refills: 0
Start: 2024-07-17

## 2024-07-23 ENCOUNTER — TELEPHONE (OUTPATIENT)
Dept: SURGERY | Facility: CLINIC | Age: 24
End: 2024-07-23
Payer: COMMERCIAL

## 2024-07-23 NOTE — TELEPHONE ENCOUNTER
Lvm for pt to call back to [facility] new pt appt with lizeth gupta for leftr breast pain/ family hx of breast cancer/ high risk breast cancer       Spoke to thalia @ [facility] office and let her know I have called pt 4 times to get her ling for a new pt appt with lizeth gupta for leftr breast pain/ family hx of breast cancer/ high risk breast cancer but pt has not called back to [facility]     Thalia Assured me she will let Payal Victor know

## 2024-08-02 RX ORDER — NORETHINDRONE ACETATE AND ETHINYL ESTRADIOL, ETHINYL ESTRADIOL AND FERROUS FUMARATE 1MG-10(24)
1 KIT ORAL DAILY
Qty: 28 TABLET | Refills: 11 | Status: SHIPPED | OUTPATIENT
Start: 2024-08-02

## 2024-08-18 DIAGNOSIS — F41.1 GAD (GENERALIZED ANXIETY DISORDER): ICD-10-CM

## 2024-08-19 ENCOUNTER — OFFICE VISIT (OUTPATIENT)
Dept: OBSTETRICS AND GYNECOLOGY | Age: 24
End: 2024-08-19
Payer: COMMERCIAL

## 2024-08-19 VITALS
DIASTOLIC BLOOD PRESSURE: 74 MMHG | HEIGHT: 65 IN | BODY MASS INDEX: 27.49 KG/M2 | SYSTOLIC BLOOD PRESSURE: 108 MMHG | WEIGHT: 165 LBS

## 2024-08-19 DIAGNOSIS — Z01.419 ENCOUNTER FOR GYNECOLOGICAL EXAMINATION WITHOUT ABNORMAL FINDING: Primary | ICD-10-CM

## 2024-08-19 DIAGNOSIS — Z12.4 SCREENING FOR MALIGNANT NEOPLASM OF THE CERVIX: ICD-10-CM

## 2024-08-19 PROCEDURE — 99395 PREV VISIT EST AGE 18-39: CPT | Performed by: OBSTETRICS & GYNECOLOGY

## 2024-08-19 RX ORDER — HYDROXYZINE HYDROCHLORIDE 25 MG/1
25-50 TABLET, FILM COATED ORAL AS NEEDED
Qty: 90 TABLET | Refills: 0 | Status: SHIPPED | OUTPATIENT
Start: 2024-08-19

## 2024-08-19 RX ORDER — CITALOPRAM 40 MG/1
40 TABLET ORAL EVERY MORNING
Qty: 30 TABLET | Refills: 0
Start: 2024-08-19

## 2024-08-19 NOTE — PROGRESS NOTES
Subjective     Chief Complaint   Patient presents with    Gynecologic Exam     AC       History of Present Illness    Desirae Pascual is a 24 y.o.  who presents for annual exam.  Patient reports her 4-year-old daughter is doing well.  Patient was able to quit smoking!.  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  She previously had an IUD but it came out.  She is on a low-dose oral contraceptive pill and that is working well for her.  She would like to continue.  She has a strong family history of breast cancer in her family.  Her genetic testing was negative.  Patient has felt a possible cyst on her labia on the left.  Her menses are regular every 28-30 days, lasting  5 days , dysmenorrhea mild, occurring first 1-2 days of flow   Obstetric History:  OB History          2    Para   1    Term   1            AB   1    Living   1         SAB        IAB        Ectopic        Molar        Multiple   0    Live Births   1               Menstrual History:     Patient's last menstrual period was 2024.         Current contraception: OCP (estrogen/progesterone)  History of abnormal Pap smear: no  Received Gardasil immunization: yes  Perform regular self breast exam : yes  Family history of uterine or ovarian cancer: yes - MGM ovarian 24   Family History of colon cancer: no  Family history of breast cancer: yes - mom 38, PGM 45, PA 2   pt's genetic testing neg     Mammogram: not indicated.  Plan at 26   Colonoscopy: not indicated.  DEXA: not indicated.    Exercise: exercises 3 times a week  Calcium/Vitamin D: adequate intake    The following portions of the patient's history were reviewed and updated as appropriate: allergies, current medications, past family history, past medical history, past social history, past surgical history, and problem list.    Review of Systems    Review of Systems   Constitutional: Negative for fatigue.   Respiratory: Negative for shortness of breath.   "  Gastrointestinal: Negative for abdominal pain.   Genitourinary: Negative for dysuria.   Neurological: Negative for headaches.   Psychiatric/Behavioral: Negative for dysphoric mood.     Objective   Physical Exam    /74   Ht 165.1 cm (65\")   Wt 74.8 kg (165 lb)   LMP 08/07/2024   BMI 27.46 kg/m²     General:   alert, appears stated age and cooperative   Neck: thyroid normal to palpation   Heart: regular rate and rhythm   Lungs: clear to auscultation bilaterally   Abdomen: soft, non-tender, without masses or organomegaly   Breast: inspection negative, no nipple discharge or bleeding, no masses or nodularity palpable   Vulva: normal, Bartholin's, Urethra, Edmore's normal, on left labia majora there is a deep sebaceous cyst about a centimeter and a half in size.  No overlying skin changes or erythema.   Vagina: normal mucosa, normal discharge   Cervix: no cervical motion tenderness and no lesions   Uterus: non-tender, normal shape and consistency   Adnexa: no mass, fullness, tenderness   Rectal: not indicated     Assessment & Plan   Diagnoses and all orders for this visit:    1. Encounter for gynecological examination without abnormal finding (Primary)    Pap smear was done today  Recommend initiating mammograms 10 years younger than her mother at about age 26.  Patient could also alternate with MRI due to strong family history.  Congratulated the patient on quitting smoking.  We discussed sebaceous cyst.  Recommend cotton underwear that is not too tight.  Cleaning the area with warm water and not shaving the area.    All questions answered.  Breast self exam technique reviewed and patient encouraged to perform self-exam monthly.  Discussed healthy lifestyle modifications.  Recommended 30 minutes of aerobic exercise five times per week.  Discussed calcium needs to prevent osteoporosis.                     "

## 2024-08-22 LAB
CONV .: NORMAL
CYTOLOGIST CVX/VAG CYTO: NORMAL
CYTOLOGY CVX/VAG DOC CYTO: NORMAL
CYTOLOGY CVX/VAG DOC THIN PREP: NORMAL
DX ICD CODE: NORMAL
Lab: NORMAL
OTHER STN SPEC: NORMAL
STAT OF ADQ CVX/VAG CYTO-IMP: NORMAL

## 2024-08-27 ENCOUNTER — TELEPHONE (OUTPATIENT)
Dept: OBSTETRICS AND GYNECOLOGY | Age: 24
End: 2024-08-27
Payer: COMMERCIAL

## 2024-08-27 DIAGNOSIS — J45.40 MODERATE PERSISTENT ASTHMA WITHOUT COMPLICATION: Primary | ICD-10-CM

## 2024-08-27 RX ORDER — ALBUTEROL SULFATE 1.25 MG/3ML
1 SOLUTION RESPIRATORY (INHALATION) EVERY 6 HOURS PRN
Qty: 75 ML | Refills: 3 | Status: SHIPPED | OUTPATIENT
Start: 2024-08-27

## 2024-08-27 NOTE — TELEPHONE ENCOUNTER
8/27/2024 KYLE pt regarding missed breast US appt. Pt states she will f/u in November when she has more PTO at work. Order extended to November.

## 2024-09-06 ENCOUNTER — OFFICE VISIT (OUTPATIENT)
Dept: OBSTETRICS AND GYNECOLOGY | Age: 24
End: 2024-09-06
Payer: COMMERCIAL

## 2024-09-06 VITALS
BODY MASS INDEX: 27.32 KG/M2 | SYSTOLIC BLOOD PRESSURE: 118 MMHG | HEIGHT: 65 IN | WEIGHT: 164 LBS | DIASTOLIC BLOOD PRESSURE: 72 MMHG

## 2024-09-06 DIAGNOSIS — R10.2 VAGINAL PAIN: ICD-10-CM

## 2024-09-06 DIAGNOSIS — N89.8 VAGINAL DISCHARGE: Primary | ICD-10-CM

## 2024-09-06 DIAGNOSIS — N89.8 VAGINAL LESION: ICD-10-CM

## 2024-09-06 DIAGNOSIS — Z11.3 SCREEN FOR STD (SEXUALLY TRANSMITTED DISEASE): ICD-10-CM

## 2024-09-06 PROCEDURE — 99214 OFFICE O/P EST MOD 30 MIN: CPT | Performed by: NURSE PRACTITIONER

## 2024-09-06 RX ORDER — VALACYCLOVIR HYDROCHLORIDE 1 G/1
1000 TABLET, FILM COATED ORAL 2 TIMES DAILY
Qty: 14 TABLET | Refills: 0 | Status: SHIPPED | OUTPATIENT
Start: 2024-09-06 | End: 2024-09-14

## 2024-09-06 NOTE — PROGRESS NOTES
"Subjective     Chief Complaint   Patient presents with    Gynecologic Exam     Vaginal pain, sore inside of vagina, discharge, odor       Desirae Pascual is a 24 y.o.  whose LMP is Patient's last menstrual period was 2024.     Pt presents today with chief complaint of vaginal pain/sore and discharge  She noticed a sore about a week ago  She states it is much better but still there  The discharge started around the same time with some odor  She was recently  from  who was having affairs  She would like serum std screening today as well    No Additional Complaints Reported    The following portions of the patient's history were reviewed and updated as appropriate:vital signs, allergies, current medications, past medical history, past social history, past surgical history, and problem list      Review of Systems   Pertinent items are noted in HPI.     Objective      /72   Ht 165.1 cm (65\")   Wt 74.4 kg (164 lb)   LMP 2024   BMI 27.29 kg/m²     Physical Exam  Genitourinary:         Comments: Two ulcerated lesions noted.        General:   alert and no distress   Heart: Not performed today   Lungs: Not performed today.   Breast: Not performed today   Neck: na   Abdomen: {Not performed today   CVA: Not performed today   Pelvis: External genitalia: see picture  Urinary system: urethral meatus normal  Vaginal: normal mucosa without prolapse or lesions, normal without tenderness, induration or masses, normal rugae, and discharge, yellow  Cervix: normal appearance   Extremities: Not performed today   Neurologic: negative   Psychiatric: Normal affect, judgement, and mood       Lab Review   Labs: No data reviewed     Imaging   No data reviewed    Assessment & Plan     ASSESSMENT  1. Vaginal discharge    2. Vaginal pain    3. Vaginal lesion    4. Screen for STD (sexually transmitted disease)        PLAN  1.   Orders Placed This Encounter   Procedures    NuSwab VG+ - Swab, Vagina    " Herpes Simplex Virus (HSV) 1 & 2, VADIM    RPR, Rfx Qn RPR / Confirm TP    Hepatitis B Surface Antigen    Hepatitis C Antibody    HIV-1 / O / 2 Ag / Antibody 4th Generation       2. Medications prescribed this encounter:        New Medications Ordered This Visit   Medications    valACYclovir (Valtrex) 1000 MG tablet     Sig: Take 1 tablet by mouth 2 (Two) Times a Day for 7 days.     Dispense:  14 tablet     Refill:  0       3. Swab done for HSV 1/2, std's, BV and yeast. Exam c/w herpes. Will start valtrex. Discussed diagnosis. Will call with results.    Follow up: SOHAN Victor, APRN  9/6/2024

## 2024-09-07 LAB
HBV SURFACE AG SERPL QL IA: NEGATIVE
HCV IGG SERPL QL IA: NON REACTIVE
HIV 1+2 AB+HIV1 P24 AG SERPL QL IA: NON REACTIVE
RPR SER QL: NON REACTIVE

## 2024-09-09 LAB
A VAGINAE DNA VAG QL NAA+PROBE: NORMAL SCORE
BVAB2 DNA VAG QL NAA+PROBE: NORMAL SCORE
C ALBICANS DNA VAG QL NAA+PROBE: NEGATIVE
C GLABRATA DNA VAG QL NAA+PROBE: NEGATIVE
C TRACH DNA SPEC QL NAA+PROBE: NEGATIVE
MEGA1 DNA VAG QL NAA+PROBE: NORMAL SCORE
N GONORRHOEA DNA VAG QL NAA+PROBE: NEGATIVE
T VAGINALIS DNA VAG QL NAA+PROBE: NEGATIVE

## 2024-09-11 PROBLEM — B00.9 HSV-2 INFECTION: Status: ACTIVE | Noted: 2024-09-11

## 2024-09-11 LAB
HSV1 DNA SPEC QL NAA+PROBE: NEGATIVE
HSV2 DNA SPEC QL NAA+PROBE: POSITIVE

## 2024-09-11 RX ORDER — VALACYCLOVIR HYDROCHLORIDE 1 G/1
1000 TABLET, FILM COATED ORAL DAILY
Qty: 90 TABLET | Refills: 3 | Status: SHIPPED | OUTPATIENT
Start: 2024-09-11

## 2024-09-16 DIAGNOSIS — F41.1 GAD (GENERALIZED ANXIETY DISORDER): ICD-10-CM

## 2024-09-16 RX ORDER — HYDROXYZINE HYDROCHLORIDE 25 MG/1
25-50 TABLET, FILM COATED ORAL AS NEEDED
Qty: 90 TABLET | Refills: 0 | Status: SHIPPED | OUTPATIENT
Start: 2024-09-16

## 2024-09-16 RX ORDER — BUPROPION HYDROCHLORIDE 150 MG/1
150 TABLET ORAL EVERY MORNING
Qty: 90 TABLET | Refills: 0 | Status: SHIPPED | OUTPATIENT
Start: 2024-09-16

## 2024-10-01 ENCOUNTER — SPECIALTY PHARMACY (OUTPATIENT)
Dept: PHARMACY | Facility: TELEHEALTH | Age: 24
End: 2024-10-01
Payer: COMMERCIAL

## 2024-10-01 PROBLEM — G43.109 CLASSICAL MIGRAINE: Status: ACTIVE | Noted: 2024-10-01

## 2024-10-02 ENCOUNTER — SPECIALTY PHARMACY (OUTPATIENT)
Dept: PHARMACY | Facility: TELEHEALTH | Age: 24
End: 2024-10-02
Payer: COMMERCIAL

## 2024-10-02 DIAGNOSIS — F41.1 GAD (GENERALIZED ANXIETY DISORDER): ICD-10-CM

## 2024-10-02 RX ORDER — HYDROXYZINE HYDROCHLORIDE 25 MG/1
25-50 TABLET, FILM COATED ORAL AS NEEDED
Qty: 90 TABLET | Refills: 0 | Status: SHIPPED | OUTPATIENT
Start: 2024-10-02

## 2024-10-02 RX ORDER — BUPROPION HYDROCHLORIDE 150 MG/1
150 TABLET ORAL EVERY MORNING
Qty: 90 TABLET | Refills: 0 | Status: SHIPPED | OUTPATIENT
Start: 2024-10-02

## 2024-10-02 RX ORDER — FLUTICASONE PROPIONATE AND SALMETEROL 250; 50 UG/1; UG/1
1 POWDER RESPIRATORY (INHALATION) 2 TIMES DAILY
Qty: 180 EACH | Refills: 1 | Status: SHIPPED | OUTPATIENT
Start: 2024-10-02 | End: 2024-12-31

## 2024-10-02 RX ORDER — ALBUTEROL SULFATE 90 UG/1
2 INHALANT RESPIRATORY (INHALATION) EVERY 4 HOURS PRN
Qty: 8.5 G | Refills: 0 | Status: SHIPPED | OUTPATIENT
Start: 2024-10-02

## 2024-10-02 NOTE — PROGRESS NOTES
Specialty Pharmacy Patient Management Program  Initial Assessment     Desirae Pascual is a 24 y.o. female with migraines and enrolled in the Patient Management program offered by [facility]. An initial outreach was conducted, including assessment of therapy appropriateness and specialty medication [facility]. The patient was introduced to services offered by [facility], including: regular assessments, refill coordination, curbside pick-up or mail order delivery options, prior authorization maintenance, and financial assistance programs as applicable. The patient was also provided with contact information for the pharmacy team.     Insurance Coverage & Financial Support  [employer] approved PA until 10/1/25.  With manufacture co-pay final cost $0     Relevant Past Medical History and Comorbidities  Relevant medical history and concomitant health conditions were discussed with the patient. The patient's chart has been reviewed for relevant past medical history and comorbid health conditions and updated as necessary.   Past Medical History:   Diagnosis Date    Acute superficial gastritis without hemorrhage 2018    Anxiety     Asthma     Deliberate self-cutting     Depression     Environmental allergies     Gestational hypertension, antepartum 2020    Migraine     Migraine without aura and without status migrainosus, not intractable 2018    Maxalt, Imitrex    Recurrent major depressive disorder, in full remission 2018    Celexa [id number]    Substance abuse     2017 Adderall, Xanax, oxycotin     Social History     Socioeconomic History    Marital status: Single     Spouse name: isreal    Number of children: 1   Tobacco Use    Smoking status: Former     Current packs/day: 0.00     Average packs/day: 0.5 packs/day for 5.0 years (2.5 ttl pk-yrs)     Types: Cigarettes     Start date: 6/15/2014     Quit date: 6/15/2019     Years since quittin.3     Smokeless tobacco: Former   Vaping Use    Vaping status: Every Day    Substances: Nicotine   Substance and Sexual Activity    Alcohol use: No    Drug use: No     Types: Marijuana, Hydrocodone, Benzodiazepines, Amphetamines     Comment: quit MJ 3 weeks ago, other 2017     Sexual activity: Yes     Partners: Male     Birth control/protection: Birth control pill     Problem list reviewed by Shade Waterman RPH on 10/2/2024 at 11:28 AM    Allergies  Known allergies and reactions were discussed with the patient. The patient's chart has been reviewed for allergy information and updated as necessary.   Iodine and Latex  Allergies reviewed by Shade Waterman RPH on 10/2/2024 at 11:27 AM    Current Medication List  This medication list has been reviewed with the patient and evaluated for any interactions or necessary modifications/recommendations, and updated to include all prescription medications, OTC medications, and supplements the patient is currently taking. This list reflects what is contained in the patient's profile, which has also been marked as reviewed to communicate to other providers it is the most up to date version of the patient's current medication therapy.     Current Outpatient Medications:     albuterol (ACCUNEB) 1.25 MG/3ML nebulizer solution, Take 3 mL by nebulization Every 6 (Six) Hours As Needed for Shortness of Air., Disp: 75 mL, Rfl: 3    albuterol sulfate  (90 Base) MCG/ACT inhaler, Inhale 2 puffs Every 4 (Four) Hours As Needed for Wheezing., Disp: 8.5 g, Rfl: 0    buPROPion XL (Wellbutrin XL) 150 MG 24 hr tablet, Take 1 tablet by mouth Every Morning., Disp: 90 tablet, Rfl: 0    Crisaborole (Eucrisa) 2 % ointment, Apply 1 Application topically As Needed (eczema)., Disp: 60 g, Rfl: 0    Fluticasone-Salmeterol (ADVAIR/WIXELA) 250-50 MCG/ACT DISKUS, Inhale 1 puff 2 (Two) Times a Day for 90 days., Disp: 180 each, Rfl: 1    hydrOXYzine (ATARAX) 25 MG tablet, Take 1-2 tablets by mouth As Needed  for anxiety with flying or for insomnia, Disp: 90 tablet, Rfl: 0    Norethin-Eth Estrad-Fe Biphas (Lo Loestrin Fe) 1 MG-10 MCG / 10 MCG tablet, Take 1 tablet by mouth Daily., Disp: 28 tablet, Rfl: 11    Rimegepant Sulfate (Nurtec) 75 MG tablet dispersible tablet, Take 1 tablet by mouth As Needed (migraine). max 1 tablet per 24 hours, Disp: 16 tablet, Rfl: 3    triamcinolone (KENALOG) 0.1 % cream, Apply 1 Application topically to the appropriate area as directed 2 (Two) Times a Day., Disp: 60 g, Rfl: 2    valACYclovir (Valtrex) 1000 MG tablet, Take 1 tablet by mouth Daily., Disp: 90 tablet, Rfl: 3  Medicines reviewed by Shade Waterman [facility] on 10/2/2024 at 11:27 AM    Drug Interactions  none     Relevant Laboratory Values  Lab Results   Component Value Date    GLUCOSE 89 05/22/2024    CALCIUM 9.8 05/22/2024     05/22/2024    K 3.8 05/22/2024    CO2 25.1 05/22/2024     05/22/2024    BUN 16 05/22/2024    CREATININE 0.77 05/22/2024    EGFRRESULT 109.9 05/22/2024    BCR 20.8 05/22/2024    ANIONGAP 8.4 02/20/2020     Lab Results   Component Value Date    WBC 5.60 05/22/2024    HGB 13.7 05/22/2024    HCT 41.0 05/22/2024    MCV 83.7 05/22/2024     05/22/2024     Lab Value Review  The above lab values have been reviewed; the following specialty medication(s) dose adjustment(s) are recommended: none.    Initial Education Provided for Specialty Medication  The patient has been provided with the following education and any applicable administration techniques (i.e. self-injection) have been demonstrated for the therapies indicated. All questions and concerns have been addressed prior to the patient receiving the medication, and the patient has verbalized understanding of the education and any materials provided. Additional patient education shall be provided and documented upon request by the patient, provider or payer.      Nurtec (rimegepant)  Medication Expectations   Why am I taking this medication? You  are taking this medication for migraine prophylaxis or to treat an acute migraine.   What should I expect while on this medication? You should expect to see a decrease in the frequency and severity of your migraines.   How does the medication work? Nurtec is a monoclonal antibody that binds to calcitonin gene-related peptide (CGRP) and blocks its binding to the receptor decreasing the severity of migraines.   How long will I be on this medication for? The amount of time you will be on this medication will be determined by your doctor and your response to the medication.    How do I take this medication? Take as directed on your prescription label.   What are some possible side effects? Potential side effects including, but not limited to nausea. Pt verbalized understanding.   What happens if I miss a dose? Take the missed dose as soon as possible, and resume the every other day timed from the last dose..     Medication Safety   What are things I should warn my doctor immediately about? Hypersensitivity reactions - trouble breathing or swallowing.   What are things that I should be cautious of? Hypersensitivity reactions (eg, dyspnea, rash), including delayed serious reactions, have occurred; discontinue use if suspected    What are some medications that can interact with this one? Avoid concomitant administration of Nurtec ODT with strong inhibitors of CY, strong or moderate inducers of CYP3A or inhibitors of P-gp or BCRP. Avoid another dose of Nurtec ODT within 48 hours when it is administered with moderate inhibitors of CY.  Ask your pharmacist or health care provider before starting new medications     Medication Storage/Handling   How should I handle this medication? Keep this medication out of reach of pets/children in original container. Ensure hands are dry before opening blister pack.   How does this medication need to be stored? Store at room temperature away from heat/cold, sunlight or moisture    How should I dispose of this medication? There should not be a need to dispose of this medication unless your provider decides to change the dose or therapy. If that is the case, take to your local police station for proper disposal. Some pharmacies also have take-back bins for medication drop-off.      Resources/Support   How can I remind myself to take this medication? You can download reminder apps to help you manage your refills. You may also set an alarm on your phone to remind you. The pharmacy carries pill boxes that you can place next to an area you pass everyday (such as where you place your car keys or where you charge your phone)   Is financial support available?  Yes, [employer] can provide co-pay cards if you have commercial insurance or patient assistance if you have Medicare or no insurance.    Which vaccines are recommended for me? Talk to your doctor about these vaccines: Flu, Coronavirus (COVID-19), Pneumococcal (pneumonia), Tdap, Hepatitis B, Zoster (shingles)        Adherence, Self-Administration, and Current Therapy Problems  Adherence related to the patient's specialty therapy was discussed with the patient. The Adherence segment of this outreach has been reviewed and updated.          Additional Barriers to Patient Self-Administration: none  Methods for Supporting Patient Self-Administration: none    Open Medication Therapy Problems  No medication therapy recommendations to display    Goals of Therapy   Goals Addressed Today        Specialty Pharmacy General Goal      Reduce severity and duration of migraine headache by 50%.              Reassessment Plan & Follow-Up  Medication Therapy Changes: start Nurtec ODT 75 mg daily as needed for migraine max 1 tablet in 24 hours  Additional Plans, Therapy Recommendations, or Therapy Problems to Be Addressed: none   Patient was given samples and states Nurtec works great to relieve acute migraine with no ADRs to report.  Pharmacist to  perform regular reassessments no more than (6) months from the previous assessment.  Welcome information and patient satisfaction survey to be sent by retail team with patient's initial fill.  Care Coordinator to set up future refill outreaches, coordinate prescription delivery, and escalate clinical questions to pharmacist.     Attestation  I attest the patient was actively involved in and has agreed to the above plan of care. I attest that the initiated specialty medication(s) are appropriate for the patient based on my assessment. If the prescribed therapy is at any point deemed not appropriate based on the current or future assessments, a consultation will be initiated with the patient's specialty care provider to determine the best course of action. The revised plan of therapy will be documented along with any reassessments and/or additional patient education provided.     Electronically signed by Shade Waterman RPH, 10/02/24, 11:29 AM EDT.

## 2024-10-30 ENCOUNTER — SPECIALTY PHARMACY (OUTPATIENT)
Age: 24
End: 2024-10-30
Payer: COMMERCIAL

## 2024-10-30 NOTE — PROGRESS NOTES
Specialty Pharmacy Refill Coordination Note     Desirae is a 24 y.o. female contacted today regarding refills of  Nurtec specialty medication(s).    Reviewed and verified with patient:       Specialty medication(s) and dose(s) confirmed: yes    Refill Questions      Flowsheet Row Most Recent Value   Changes to allergies? No   Changes to medications? No   New conditions or infections since last clinic visit No   Unplanned office visit, urgent care, ED, or hospital admission in the last 4 weeks  No   How does patient/caregiver feel medication is working? Very good   Financial problems or insurance changes  No   Since the previous refill, were any specialty medication doses or scheduled injections missed or delayed?  No   Does this patient require a clinical escalation to a pharmacist? No            Delivery Questions      Flowsheet Row Most Recent Value   Delivery method UPS  [UPS NEXT DAY AIR]   Delivery address verified with patient/caregiver? Yes  [UPS NEXT DAY AIR]   Delivery address Home  [UPS NEXT DAY AIR]   Number of medications in delivery 1   Medication(s) being filled and delivered Rimegepant Sulfate (Nurtec)   Doses left of specialty medications 2 boxes   Copay verified? Yes  [$0.00]   Copay amount $0.00   Copay form of payment No copayment ($0)   Ship Date 10/30/2024   Delivery Date 10/31/2024   Signature Required No                   Follow-up: 21 day(s)     Sasha Hendrickson, Pharmacy Technician  Specialty Pharmacy Technician

## 2024-11-05 ENCOUNTER — CLINICAL SUPPORT (OUTPATIENT)
Dept: OBSTETRICS AND GYNECOLOGY | Facility: CLINIC | Age: 24
End: 2024-11-05
Payer: COMMERCIAL

## 2024-11-05 DIAGNOSIS — Z23 NEED FOR INFLUENZA VACCINATION: Primary | ICD-10-CM

## 2024-11-05 NOTE — PROGRESS NOTES
Pt received the flu shot, office supplied. The injection was given IM in the right deltoid. Pt tolerated well with no reaction.

## 2024-11-11 RX ORDER — CITALOPRAM HYDROBROMIDE 20 MG/1
20 TABLET ORAL DAILY
Qty: 30 TABLET | Refills: 3
Start: 2024-11-11 | End: 2024-11-11

## 2024-11-11 RX ORDER — CITALOPRAM HYDROBROMIDE 20 MG/1
20 TABLET ORAL DAILY
Qty: 30 TABLET | Refills: 3 | Status: SHIPPED | OUTPATIENT
Start: 2024-11-11 | End: 2024-11-12 | Stop reason: SDUPTHER

## 2024-11-12 RX ORDER — CITALOPRAM HYDROBROMIDE 20 MG/1
20 TABLET ORAL DAILY
Qty: 30 TABLET | Refills: 3 | Status: SHIPPED | OUTPATIENT
Start: 2024-11-12

## 2024-11-15 ENCOUNTER — PATIENT MESSAGE (OUTPATIENT)
Dept: FAMILY MEDICINE CLINIC | Facility: CLINIC | Age: 24
End: 2024-11-15
Payer: COMMERCIAL

## 2024-12-19 ENCOUNTER — SPECIALTY PHARMACY (OUTPATIENT)
Dept: PHARMACY | Facility: TELEHEALTH | Age: 24
End: 2024-12-19
Payer: COMMERCIAL

## 2024-12-19 NOTE — PROGRESS NOTES
Specialty Pharmacy Refill Coordination Note     Desirae is a 24 y.o. female contacted today regarding refills of  Nurtec specialty medication(s).    Reviewed and verified with patient:       Specialty medication(s) and dose(s) confirmed: yes    Refill Questions      Flowsheet Row Most Recent Value   Changes to allergies? No   Changes to medications? Yes   [Started taking Celexa 20 mg]   New conditions or infections since last clinic visit No   Unplanned office visit, urgent care, ED, or hospital admission in the last 4 weeks  No   How does patient/caregiver feel medication is working? Very good   Financial problems or insurance changes  No   Since the previous refill, were any specialty medication doses or scheduled injections missed or delayed?  No   Does this patient require a clinical escalation to a pharmacist? Yes   [Started taking Celexa 20 mg]            Delivery Questions      Flowsheet Row Most Recent Value   Delivery method UPS  [UPS NEXT DAY AIR]   Delivery address verified with patient/caregiver? Yes  [UPS NEXT DAY AIR]   Delivery address Home  [UPS NEXT DAY AIR]   Number of medications in delivery 1   Medication(s) being filled and delivered Rimegepant Sulfate (Nurtec)   Doses left of specialty medications 1 1/2 boxes   Copay verified? Yes  [$0.00]   Copay amount $0.00   Copay form of payment No copayment ($0)   Ship Date 12/19/2024   Delivery Date 12/20/2024   Signature Required No                   Follow-up: 21    day(s)     Sasha Hendrickson, Pharmacy Technician  Specialty Pharmacy Technician

## 2025-01-13 ENCOUNTER — PATIENT MESSAGE (OUTPATIENT)
Dept: OBSTETRICS AND GYNECOLOGY | Age: 25
End: 2025-01-13
Payer: COMMERCIAL

## 2025-01-13 ENCOUNTER — TELEPHONE (OUTPATIENT)
Dept: OBSTETRICS AND GYNECOLOGY | Age: 25
End: 2025-01-13

## 2025-01-13 DIAGNOSIS — N92.6 MISSED MENSES: Primary | ICD-10-CM

## 2025-01-13 DIAGNOSIS — Z32.01 PREGNANCY TEST POSITIVE: Primary | ICD-10-CM

## 2025-01-13 NOTE — TELEPHONE ENCOUNTER
Hub staff attempted to follow warm transfer process and was unsuccessful     Caller: Desirae Pascual    Relationship to patient: Self    Best call back number: [phone] (home)       Patient RETURNED CALL TO OFFICE. PT HAD REQUESTED ORDERS FOR LABS.

## 2025-01-30 RX ORDER — PROMETHAZINE HYDROCHLORIDE 25 MG/1
25 TABLET ORAL EVERY 6 HOURS PRN
Qty: 30 TABLET | Refills: 3 | Status: SHIPPED | OUTPATIENT
Start: 2025-01-30

## 2025-02-20 ENCOUNTER — INITIAL PRENATAL (OUTPATIENT)
Dept: OBSTETRICS AND GYNECOLOGY | Age: 25
End: 2025-02-20
Payer: COMMERCIAL

## 2025-02-20 VITALS — WEIGHT: 158 LBS | SYSTOLIC BLOOD PRESSURE: 108 MMHG | BODY MASS INDEX: 26.29 KG/M2 | DIASTOLIC BLOOD PRESSURE: 74 MMHG

## 2025-02-20 DIAGNOSIS — Z13.89 SCREENING FOR BLOOD OR PROTEIN IN URINE: Primary | ICD-10-CM

## 2025-02-20 DIAGNOSIS — Z11.3 SCREENING EXAMINATION FOR VENEREAL DISEASE: ICD-10-CM

## 2025-02-20 DIAGNOSIS — Z34.90 PREGNANCY, UNSPECIFIED GESTATIONAL AGE: ICD-10-CM

## 2025-02-20 DIAGNOSIS — J45.40 MODERATE PERSISTENT ASTHMA WITHOUT COMPLICATION: ICD-10-CM

## 2025-02-20 DIAGNOSIS — Z3A.09 9 WEEKS GESTATION OF PREGNANCY: ICD-10-CM

## 2025-02-20 PROBLEM — D50.9 IRON (FE) DEFICIENCY ANEMIA: Status: RESOLVED | Noted: 2019-11-04 | Resolved: 2025-02-20

## 2025-02-20 LAB
GLUCOSE UR STRIP-MCNC: NEGATIVE MG/DL
PROT UR STRIP-MCNC: NEGATIVE MG/DL

## 2025-02-20 RX ORDER — PRENATAL VIT/IRON FUM/FOLIC AC 27MG-0.8MG
1 TABLET ORAL DAILY
COMMUNITY

## 2025-02-20 RX ORDER — CETIRIZINE HYDROCHLORIDE 5 MG/1
5 TABLET ORAL DAILY
COMMUNITY

## 2025-02-20 RX ORDER — ASPIRIN 81 MG/1
81 TABLET ORAL DAILY
Qty: 90 TABLET | Refills: 2 | Status: SHIPPED | OUTPATIENT
Start: 2025-02-20

## 2025-02-20 NOTE — PROGRESS NOTES
Chief Complaint   Patient presents with    Initial Prenatal Visit       HPI: 24 y.o.  at 9w5d by sure LMP consistent with ultrasound.  Patient is here today with her boyfriend Timi.  They are excited about the pregnancy.  Patient works as a medical assistant for [employer].  Patient has significant history of asthma.  She reports that about 4 weeks ago after using a bath bomb she had severe wheezing and had to call an ambulance because her albuterol nebulizer treatments were not working.  Patient is on Advair daily.  She reports she still has to use her albuterol inhaler once or twice a day.  She does not have a pulmonologist.  She follows with ENT.  She has had her flu vaccine.  She reports that she has previously had COVID-vaccine but not this past fall.  Patient has anxiety.  She is taking Celexa which she feels like controls her anxiety relatively well.    She is at high risk for breast cancer based on family history.  Her genetic testing was negative  Last pregnancy was full-term vaginal delivery.  Patient was induced at 39-5/7 weeks.  Patient has had nausea.  She is try frequent small meals.  She takes Phenergan when she feels nausea late in the day.  The Bonjesta was too expensive to purchase.  She has been taking a prenatal vitamins.  She plans to start a regular prenatal vitamin today.  She has a history of postpartum hypertension she was started on labetalol 100 mg twice daily.    Relevant data reviewed:    Last OB US Data (since 2024)       None          Vitals:    25 1329   BP: 108/74   Weight: 71.7 kg (158 lb)     Total weight gain for pregnancy:  -2.722 kg (-6 lb)        Review of systems:   Gen: fatigue  CV:     negative  GI: nausea and vomiting   :   negative  MS:    negative  Neuro: negative  Pul: negative    Physical Exam  Constitutional:       General: She is not in acute distress.     Appearance: Normal appearance. She is not ill-appearing.   Cardiovascular:      Rate and  Rhythm: Normal rate and regular rhythm.      Pulses: Normal pulses.      Heart sounds: Normal heart sounds.   Pulmonary:      Effort: No respiratory distress.      Breath sounds: Normal breath sounds. No wheezing or rales.   Abdominal:      General: Abdomen is flat.      Palpations: Abdomen is soft.   Neurological:      Mental Status: She is alert.   Psychiatric:         Mood and Affect: Mood normal.         Thought Content: Thought content normal.         Judgment: Judgment normal.         A/P  1. Intrauterine pregnancy at 9w5d   2. Pregnancy Risk:  COMPLICATED     Diagnoses and all orders for this visit:    1. Screening for blood or protein in urine (Primary)  -     POC Urinalysis Dipstick  -     Urine Culture - Urine, Urine, Random Void    2. Moderate persistent asthma without complication  Overview:  Under the care of allergist: [facility]   ENT - Dr Wahl    Orders:  -     Ambulatory Referral to Pulmonology    3. 9 weeks gestation of pregnancy  -     OB Panel With HIV and RPR  -     Ambulatory Referral to Pulmonology    4. Pregnancy, unspecified gestational age  -     Jeb Panorama Prenatal Test: Chromosomes 13, 18, 21, X & Y: Triploidy 22Q.11.2 Deletion - Blood,    5. Screening examination for venereal disease  -     Chlamydia trachomatis, Neisseria gonorrhoeae, Trichomonas vaginalis, PCR - Urine, Urine, Clean Catch    Other orders  -     aspirin 81 MG EC tablet; Take 1 tablet by mouth Daily.  Dispense: 90 tablet; Refill: 2    Patient has significant asthma.  I will refer her to pulmonology.  She will continue her Advair daily and albuterol as needed.  She tried Singulair in the past but had side effects.  Cell free DNA testing today.  Patient does want to know the gender.  Prior carrier testing was negative  History of postpartum hypertension-start low-dose aspirin at 12 weeks  New OB labs today  New OB information was reviewed in detail and folder given  Due to asthma encourage COVID  vaccination.  Patient has had her flu vaccine this season.  Discussed nausea.  Patient will add Unisom at bedtime and B6 throughout the day.  She will continue Phenergan as needed.  She is hopeful that the nausea will improve soon.    Nutrition and weight gain were addressed.  Practice OB call structure was discussed.   Encouraged exercise at least 3 x weekly   -----------------------  PLAN:   Return in about 4 weeks (around 3/20/2025) for Recheck.    Nguyễn Yap MD  2/20/2025 17:24 EST

## 2025-02-21 LAB
ABO GROUP BLD: NORMAL
BASOPHILS # BLD AUTO: 0 X10E3/UL (ref 0–0.2)
BASOPHILS NFR BLD AUTO: 0 %
BLD GP AB SCN SERPL QL: NEGATIVE
EOSINOPHIL # BLD AUTO: 0.2 X10E3/UL (ref 0–0.4)
EOSINOPHIL NFR BLD AUTO: 2 %
ERYTHROCYTE [DISTWIDTH] IN BLOOD BY AUTOMATED COUNT: 12.3 % (ref 11.7–15.4)
HBV SURFACE AG SERPL QL IA: NEGATIVE
HCT VFR BLD AUTO: 35.3 % (ref 34–46.6)
HCV AB SERPL QL IA: NORMAL
HCV IGG SERPL QL IA: NON REACTIVE
HGB BLD-MCNC: 11.9 G/DL (ref 11.1–15.9)
HIV 1+2 AB+HIV1 P24 AG SERPL QL IA: NON REACTIVE
IMM GRANULOCYTES # BLD AUTO: 0 X10E3/UL (ref 0–0.1)
IMM GRANULOCYTES NFR BLD AUTO: 0 %
LYMPHOCYTES # BLD AUTO: 1.4 X10E3/UL (ref 0.7–3.1)
LYMPHOCYTES NFR BLD AUTO: 17 %
MCH RBC QN AUTO: 29 PG (ref 26.6–33)
MCHC RBC AUTO-ENTMCNC: 33.7 G/DL (ref 31.5–35.7)
MCV RBC AUTO: 86 FL (ref 79–97)
MONOCYTES # BLD AUTO: 0.5 X10E3/UL (ref 0.1–0.9)
MONOCYTES NFR BLD AUTO: 5 %
NEUTROPHILS # BLD AUTO: 6.6 X10E3/UL (ref 1.4–7)
NEUTROPHILS NFR BLD AUTO: 76 %
PLATELET # BLD AUTO: 191 X10E3/UL (ref 150–450)
RBC # BLD AUTO: 4.1 X10E6/UL (ref 3.77–5.28)
RH BLD: POSITIVE
RPR SER QL: NON REACTIVE
RUBV IGG SERPL IA-ACNC: 3.82 INDEX
WBC # BLD AUTO: 8.7 X10E3/UL (ref 3.4–10.8)

## 2025-02-22 LAB
BACTERIA UR CULT: NORMAL
BACTERIA UR CULT: NORMAL
C TRACH RRNA SPEC QL NAA+PROBE: NEGATIVE
N GONORRHOEA RRNA SPEC QL NAA+PROBE: NEGATIVE
T VAGINALIS RRNA SPEC QL NAA+PROBE: NEGATIVE

## 2025-02-25 RX ORDER — PRENATAL VIT/IRON FUM/FOLIC AC 27MG-0.8MG
1 TABLET ORAL DAILY
Qty: 30 TABLET | Refills: 2 | Status: SHIPPED | OUTPATIENT
Start: 2025-02-25

## 2025-03-06 RX ORDER — CITALOPRAM HYDROBROMIDE 20 MG/1
20 TABLET ORAL DAILY
Qty: 30 TABLET | Refills: 3 | Status: SHIPPED | OUTPATIENT
Start: 2025-03-06

## 2025-03-18 RX ORDER — CETIRIZINE HYDROCHLORIDE 5 MG/1
5 TABLET ORAL DAILY
Qty: 90 TABLET | Refills: 0 | Status: SHIPPED | OUTPATIENT
Start: 2025-03-18

## 2025-03-20 ENCOUNTER — ROUTINE PRENATAL (OUTPATIENT)
Dept: OBSTETRICS AND GYNECOLOGY | Age: 25
End: 2025-03-20
Payer: COMMERCIAL

## 2025-03-20 VITALS — WEIGHT: 165 LBS | BODY MASS INDEX: 27.46 KG/M2 | DIASTOLIC BLOOD PRESSURE: 70 MMHG | SYSTOLIC BLOOD PRESSURE: 132 MMHG

## 2025-03-20 DIAGNOSIS — J45.40 MODERATE PERSISTENT ASTHMA WITHOUT COMPLICATION: ICD-10-CM

## 2025-03-20 DIAGNOSIS — B00.9 HSV-2 INFECTION: ICD-10-CM

## 2025-03-20 DIAGNOSIS — Z3A.13 13 WEEKS GESTATION OF PREGNANCY: Primary | ICD-10-CM

## 2025-03-20 DIAGNOSIS — Z13.89 SCREENING FOR HEMATURIA OR PROTEINURIA: ICD-10-CM

## 2025-03-20 DIAGNOSIS — F41.9 ANXIETY: ICD-10-CM

## 2025-03-20 LAB
BILIRUB BLD-MCNC: NEGATIVE MG/DL
CLARITY, POC: CLEAR
COLOR UR: YELLOW
GLUCOSE UR STRIP-MCNC: NEGATIVE MG/DL
KETONES UR QL: NEGATIVE
LEUKOCYTE EST, POC: ABNORMAL
NITRITE UR-MCNC: NEGATIVE MG/ML
PH UR: 6 [PH] (ref 5–8)
PROT UR STRIP-MCNC: NEGATIVE MG/DL
RBC # UR STRIP: ABNORMAL /UL
SP GR UR: 1.02 (ref 1–1.03)
UROBILINOGEN UR QL: ABNORMAL

## 2025-03-20 PROCEDURE — 0502F SUBSEQUENT PRENATAL CARE: CPT | Performed by: OBSTETRICS & GYNECOLOGY

## 2025-03-20 RX ORDER — VALACYCLOVIR HYDROCHLORIDE 1 G/1
1000 TABLET, FILM COATED ORAL DAILY
Qty: 90 TABLET | Refills: 3 | Status: SHIPPED | OUTPATIENT
Start: 2025-03-20

## 2025-03-20 NOTE — PROGRESS NOTES
CC- pregnancy    HPI: 24 y.o.  at 13w5d patient reports that her asthma has improved.  She canceled her appointment with the pulmonologist because she had to work.  She has not rescheduled yet but plans to.  She is using her daily inhaler.  Patient wanted to discuss an area she has had on her lateral right ankle since July.  She did not have an injury but she has had an area of swelling.  She is scheduled to go see a foot and ankle specialist soon.  Back in July she had a Doppler done that was normal.  She is not having any calf pain.  She does not wear any compression stockings.  Patient had herpes outbreak.  She started taking her Valtrex twice a day.  She was worried if it was okay to take during pregnancy.  Her nausea is improved.  She is taking her Celexa.  She has been checking her blood pressure at home.  She thinks that she does get anxious and she has had mostly readings in the 130s over 70s but did have 1 reading at 140/80.    EXAM:  Last OB US Data (since 2024)       None          Vitals:    25 1425   BP: 132/70   Weight: 74.8 kg (165 lb)     Total weight gain for pregnancy:  0.454 kg (1 lb)  Weight gain for pregnancy is low  Doppler heart tones are positive  Cell free DNA testing is normal.  Baby is a boy.    A/P  1. Intrauterine pregnancy at 13w5d   2. Pregnancy Risk:  COMPLICATED      Diagnoses and all orders for this visit:    1. 13 weeks gestation of pregnancy (Primary)  -     POC Urinalysis Dipstick    2. Screening for hematuria or proteinuria  -     Urine Culture - Urine, Urine, Clean Catch    3. Anxiety    4. Moderate persistent asthma without complication  Overview:  Under the care of allergist: [facility]   ENT - Dr Wahl      5. HSV-2 infection    Other orders  -     valACYclovir (Valtrex) 1000 MG tablet; Take 1 tablet by mouth Daily.  Dispense: 90 tablet; Refill: 3      -----------------------  Blood pressure today is normal but I asked the patient to call if  she has greater than 140/90  Herpes outbreak-recommend twice daily Valtrex for 5 to 7 days.  If she has another outbreak I would recommend starting suppression  Asthma-continue daily inhaler and schedule follow-up with pulmonary  Anxiety-continue Celexa  aFP at next visit      Nguyễn Yap MD  3/20/2025 17:25 EDT

## 2025-03-22 LAB
BACTERIA UR CULT: NORMAL
BACTERIA UR CULT: NORMAL